# Patient Record
Sex: FEMALE | Race: OTHER | HISPANIC OR LATINO | Employment: FULL TIME | URBAN - METROPOLITAN AREA
[De-identification: names, ages, dates, MRNs, and addresses within clinical notes are randomized per-mention and may not be internally consistent; named-entity substitution may affect disease eponyms.]

---

## 2022-07-26 ENCOUNTER — APPOINTMENT (OUTPATIENT)
Dept: URGENT CARE | Facility: CLINIC | Age: 37
End: 2022-07-26
Payer: OTHER MISCELLANEOUS

## 2022-07-26 PROCEDURE — G0382 LEV 3 HOSP TYPE B ED VISIT: HCPCS | Performed by: NURSE PRACTITIONER

## 2022-07-26 PROCEDURE — 99283 EMERGENCY DEPT VISIT LOW MDM: CPT | Performed by: NURSE PRACTITIONER

## 2022-07-30 ENCOUNTER — APPOINTMENT (OUTPATIENT)
Dept: URGENT CARE | Facility: CLINIC | Age: 37
End: 2022-07-30
Payer: OTHER MISCELLANEOUS

## 2022-07-30 PROCEDURE — 99215 OFFICE O/P EST HI 40 MIN: CPT | Performed by: FAMILY MEDICINE

## 2022-08-03 ENCOUNTER — APPOINTMENT (OUTPATIENT)
Dept: URGENT CARE | Facility: CLINIC | Age: 37
End: 2022-08-03
Payer: OTHER MISCELLANEOUS

## 2022-08-03 PROCEDURE — 99214 OFFICE O/P EST MOD 30 MIN: CPT | Performed by: FAMILY MEDICINE

## 2022-09-14 ENCOUNTER — HOSPITAL ENCOUNTER (OUTPATIENT)
Dept: RADIOLOGY | Age: 37
Discharge: HOME | End: 2022-09-14
Payer: OTHER MISCELLANEOUS

## 2022-09-14 DIAGNOSIS — S50.12XD CONTUSION OF LEFT FOREARM, SUBSEQUENT ENCOUNTER: ICD-10-CM

## 2022-09-14 DIAGNOSIS — M79.642 LEFT HAND PAIN: ICD-10-CM

## 2022-09-14 DIAGNOSIS — S60.212D CONTUSION OF LEFT WRIST, SUBSEQUENT ENCOUNTER: ICD-10-CM

## 2022-09-14 PROCEDURE — G1004 CDSM NDSC: HCPCS

## 2022-09-14 PROCEDURE — 73221 MRI JOINT UPR EXTREM W/O DYE: CPT

## 2023-07-08 ENCOUNTER — HOSPITAL ENCOUNTER (EMERGENCY)
Facility: HOSPITAL | Age: 38
Discharge: HOME | End: 2023-07-08
Attending: EMERGENCY MEDICINE
Payer: COMMERCIAL

## 2023-07-08 VITALS
TEMPERATURE: 99.3 F | WEIGHT: 132.28 LBS | BODY MASS INDEX: 22.58 KG/M2 | RESPIRATION RATE: 18 BRPM | DIASTOLIC BLOOD PRESSURE: 87 MMHG | HEART RATE: 81 BPM | OXYGEN SATURATION: 98 % | SYSTOLIC BLOOD PRESSURE: 145 MMHG | HEIGHT: 64 IN

## 2023-07-08 DIAGNOSIS — H00.014 HORDEOLUM EXTERNUM OF LEFT UPPER EYELID: Primary | ICD-10-CM

## 2023-07-08 PROCEDURE — 99282 EMERGENCY DEPT VISIT SF MDM: CPT

## 2023-07-08 PROCEDURE — 99284 EMERGENCY DEPT VISIT MOD MDM: CPT | Performed by: EMERGENCY MEDICINE

## 2023-07-08 RX ORDER — TETRACAINE HYDROCHLORIDE 5 MG/ML
1 SOLUTION OPHTHALMIC ONCE
Status: COMPLETED | OUTPATIENT
Start: 2023-07-08 | End: 2023-07-08

## 2023-07-08 RX ORDER — ERYTHROMYCIN 5 MG/G
OINTMENT OPHTHALMIC
Qty: 3.5 G | Refills: 0 | Status: SHIPPED | OUTPATIENT
Start: 2023-07-08

## 2023-07-08 RX ADMIN — FLUORESCEIN SODIUM 1 STRIP: 1 STRIP OPHTHALMIC at 20:27

## 2023-07-08 RX ADMIN — TETRACAINE HYDROCHLORIDE 1 DROP: 5 SOLUTION OPHTHALMIC at 20:27

## 2023-07-08 NOTE — Clinical Note
Reddy Albarado was seen and treated in our emergency department on 7/8/2023. No restrictions            Diagnosis:     Reddy  may return to work on return date. She may return on this date: 07/10/2023         If you have any questions or concerns, please don't hesitate to call.       Devin Kearney MD    ______________________________           _______________          _______________  Hospital Representative                              Date                                Time

## 2023-07-09 NOTE — ED ATTENDING ATTESTATION
7/8/2023  IYobani MD, saw and evaluated the patient. I have discussed the patient with the resident/non-physician practitioner and agree with the resident's/non-physician practitioner's findings, Plan of Care, and MDM as documented in the resident's/non-physician practitioner's note, except where noted. All available labs and Radiology studies were reviewed. I was present for key portions of any procedure(s) performed by the resident/non-physician practitioner and I was immediately available to provide assistance. At this point I agree with the current assessment done in the Emergency Department.   I have conducted an independent evaluation of this patient a history and physical is as follows:    ED Course         Critical Care Time  Procedures

## 2023-07-09 NOTE — ED PROVIDER NOTES
History  Chief Complaint   Patient presents with   • Eye Pain     Pt presenting with Left eye pain, photosensitivity, and burning. She was recently diagnosed w/ an eye infection and has been on antibiotic eye drops      41year old female presents to ED with non radiating left eye pain. Pt was in the ED on 26th of June for the same eye problem and was given ciprofloxacin. Pt states that the antibiotics and warm compresses helped the eye pain. The pain is 8-9/10 and the pain is worse when she opens, closes her eye and moving her eye. The pain feels like the eye is growing and that there is something stabbing in her eye. Pt denies the use of any contacts or makeup.  used. ID: [id number]          None       History reviewed. No pertinent past medical history. History reviewed. No pertinent surgical history. History reviewed. No pertinent family history. I have reviewed and agree with the history as documented. E-Cigarette/Vaping     E-Cigarette/Vaping Substances     Social History     Tobacco Use   • Smoking status: Never   • Smokeless tobacco: Never   Substance Use Topics   • Alcohol use: Not Currently   • Drug use: Never        Review of Systems   Constitutional: Negative for chills and fever. Respiratory: Negative for cough, chest tightness and shortness of breath. Cardiovascular: Negative for chest pain. Gastrointestinal: Negative for constipation, diarrhea, nausea and vomiting. Genitourinary: Negative for difficulty urinating, dysuria, hematuria and urgency.        Physical Exam  ED Triage Vitals   Temperature Pulse Respirations Blood Pressure SpO2   07/08/23 1715 07/08/23 1715 07/08/23 1715 07/08/23 1715 07/08/23 1715   98.4 °F (36.9 °C) 80 17 145/87 99 %      Temp Source Heart Rate Source Patient Position - Orthostatic VS BP Location FiO2 (%)   07/08/23 1715 07/08/23 1715 07/08/23 1715 07/08/23 1715 --   Oral Monitor Sitting Left arm       Pain Score       07/08/23 1716 8             Orthostatic Vital Signs  Vitals:    07/08/23 1715 07/08/23 1716   BP: 145/87 145/87   Pulse: 80 81   Patient Position - Orthostatic VS: Sitting Sitting       Physical Exam  Eyes:      General: No scleral icterus. Left eye: Hordeolum (small, in the left corner of the left eye) present. Intraocular pressure: Left eye pressure is 18 mmHg. Measurements were taken using a handheld tonometer. Extraocular Movements: Extraocular movements intact. Left eye: Normal extraocular motion. Conjunctiva/sclera:      Left eye: Left conjunctiva is injected (small injection in the corners of the left eye). No exudate or hemorrhage. Pupils: Pupils are equal, round, and reactive to light. Left eye: No corneal abrasion or fluorescein uptake. Funduscopic exam:        Left eye: Red reflex present. Slit lamp exam:     Left eye: Photophobia present. No corneal ulcer, foreign body, hyphema or hypopyon. ED Medications  Medications   fluorescein sodium sterile ophthalmic strip 1 strip (1 strip Left Eye Given 7/8/23 2027)   tetracaine 0.5 % ophthalmic solution 1 drop (1 drop Left Eye Given 7/8/23 2027)       Diagnostic Studies  Results Reviewed     None                 No orders to display         Procedures  Procedures      ED Course                                       Medical Decision Making  41year old female presents to ED with left eye pain. Pt was diagnosed with a hordeolum on the left eye on June 26th in the ED. Pt was taking ciprofloxacin with mild relief but the pain was continuing. The pt was photosensitive, and the pain worsened with eye movement, opening and closing of the eye. Tetracaine and fluorescent dye was given to the pt. No corneal ulcers or foreign bodies seen. Tonometry showed 18 mmHg. Patient was discharged with erythromycin ointment and told to follow up with ophthalmology if symptoms persist or worsen.        Risk  Prescription drug management. Disposition  Final diagnoses:   Hordeolum externum of left upper eyelid     Time reflects when diagnosis was documented in both MDM as applicable and the Disposition within this note     Time User Action Codes Description Comment    7/8/2023  8:52 PM Wallace Delarosa Add [C11.217] Hordeolum externum of left upper eyelid       ED Disposition     ED Disposition   Discharge    Condition   Stable    Date/Time   Sat Jul 8, 2023  8:51 PM    Comment   Reddy Albarado discharge to home/self care. Follow-up Information     Follow up With Specialties Details Why Contact Info    Claire Bellamy MD Ophthalmology Schedule an appointment as soon as possible for a visit in 1 week As needed, If symptoms worsen [address]  [phone]            Patient's Medications   Discharge Prescriptions    ERYTHROMYCIN (ILOTYCIN) OPHTHALMIC OINTMENT    Place a 1/2 inch ribbon of ointment into the lower eyelid. Start Date: 7/8/2023  End Date: --       Order Dose: --       Quantity: 3.5 g    Refills: 0     No discharge procedures on file. PDMP Review     None           ED Provider  Attending physically available and evaluated Reddy CamposDannaluca. I managed the patient along with the ED Attending.     Electronically Signed by         Wallace Delarosa MD  07/08/23 9685

## 2024-10-16 ENCOUNTER — APPOINTMENT (EMERGENCY)
Dept: ULTRASOUND IMAGING | Facility: HOSPITAL | Age: 39
End: 2024-10-16
Payer: COMMERCIAL

## 2024-10-16 ENCOUNTER — HOSPITAL ENCOUNTER (EMERGENCY)
Facility: HOSPITAL | Age: 39
Discharge: HOME | End: 2024-10-16
Payer: COMMERCIAL

## 2024-10-16 VITALS
SYSTOLIC BLOOD PRESSURE: 133 MMHG | HEART RATE: 82 BPM | DIASTOLIC BLOOD PRESSURE: 76 MMHG | TEMPERATURE: 98.3 F | OXYGEN SATURATION: 100 % | RESPIRATION RATE: 18 BRPM

## 2024-10-16 DIAGNOSIS — R10.31 RIGHT LOWER QUADRANT ABDOMINAL PAIN: Primary | ICD-10-CM

## 2024-10-16 DIAGNOSIS — Z32.01 POSITIVE PREGNANCY TEST: ICD-10-CM

## 2024-10-16 LAB
ALBUMIN SERPL BCG-MCNC: 4.6 G/DL (ref 3.5–5)
ALP SERPL-CCNC: 47 U/L (ref 34–104)
ALT SERPL W P-5'-P-CCNC: 10 U/L (ref 7–52)
ANION GAP SERPL CALCULATED.3IONS-SCNC: 7 MMOL/L (ref 4–13)
AST SERPL W P-5'-P-CCNC: 16 U/L (ref 13–39)
B-HCG SERPL-ACNC: 622.7 MIU/ML (ref 0–5)
BASOPHILS # BLD AUTO: 0.1 THOUSANDS/ΜL (ref 0–0.1)
BASOPHILS NFR BLD AUTO: 1 % (ref 0–1)
BILIRUB SERPL-MCNC: 0.61 MG/DL (ref 0.2–1)
BILIRUB UR QL STRIP: NEGATIVE
BUN SERPL-MCNC: 16 MG/DL (ref 5–25)
CALCIUM SERPL-MCNC: 9.1 MG/DL (ref 8.4–10.2)
CHLORIDE SERPL-SCNC: 104 MMOL/L (ref 96–108)
CLARITY UR: CLEAR
CO2 SERPL-SCNC: 25 MMOL/L (ref 21–32)
COLOR UR: ABNORMAL
CREAT SERPL-MCNC: 0.59 MG/DL (ref 0.6–1.3)
EOSINOPHIL # BLD AUTO: 0.28 THOUSAND/ΜL (ref 0–0.61)
EOSINOPHIL NFR BLD AUTO: 4 % (ref 0–6)
ERYTHROCYTE [DISTWIDTH] IN BLOOD BY AUTOMATED COUNT: 12.1 % (ref 11.6–15.1)
EXT PREGNANCY TEST URINE: POSITIVE
EXT. CONTROL: ABNORMAL
GFR SERPL CREATININE-BSD FRML MDRD: 116 ML/MIN/1.73SQ M
GLUCOSE SERPL-MCNC: 87 MG/DL (ref 65–140)
GLUCOSE UR STRIP-MCNC: NEGATIVE MG/DL
HCT VFR BLD AUTO: 40 % (ref 34.8–46.1)
HGB BLD-MCNC: 13.1 G/DL (ref 11.5–15.4)
HGB UR QL STRIP.AUTO: NEGATIVE
IMM GRANULOCYTES # BLD AUTO: 0.02 THOUSAND/UL (ref 0–0.2)
IMM GRANULOCYTES NFR BLD AUTO: 0 % (ref 0–2)
KETONES UR STRIP-MCNC: ABNORMAL MG/DL
LEUKOCYTE ESTERASE UR QL STRIP: NEGATIVE
LIPASE SERPL-CCNC: 21 U/L (ref 11–82)
LYMPHOCYTES # BLD AUTO: 2.52 THOUSANDS/ΜL (ref 0.6–4.47)
LYMPHOCYTES NFR BLD AUTO: 34 % (ref 14–44)
MCH RBC QN AUTO: 29.8 PG (ref 26.8–34.3)
MCHC RBC AUTO-ENTMCNC: 32.8 G/DL (ref 31.4–37.4)
MCV RBC AUTO: 91 FL (ref 82–98)
MONOCYTES # BLD AUTO: 0.37 THOUSAND/ΜL (ref 0.17–1.22)
MONOCYTES NFR BLD AUTO: 5 % (ref 4–12)
NEUTROPHILS # BLD AUTO: 4.03 THOUSANDS/ΜL (ref 1.85–7.62)
NEUTS SEG NFR BLD AUTO: 56 % (ref 43–75)
NITRITE UR QL STRIP: NEGATIVE
NRBC BLD AUTO-RTO: 0 /100 WBCS
PH UR STRIP.AUTO: 6 [PH]
PLATELET # BLD AUTO: 240 THOUSANDS/UL (ref 149–390)
PMV BLD AUTO: 11.1 FL (ref 8.9–12.7)
POTASSIUM SERPL-SCNC: 3.4 MMOL/L (ref 3.5–5.3)
PROT SERPL-MCNC: 7.6 G/DL (ref 6.4–8.4)
PROT UR STRIP-MCNC: NEGATIVE MG/DL
RBC # BLD AUTO: 4.4 MILLION/UL (ref 3.81–5.12)
SODIUM SERPL-SCNC: 136 MMOL/L (ref 135–147)
SP GR UR STRIP.AUTO: 1.02 (ref 1–1.03)
UROBILINOGEN UR STRIP-ACNC: <2 MG/DL
WBC # BLD AUTO: 7.32 THOUSAND/UL (ref 4.31–10.16)

## 2024-10-16 PROCEDURE — 99285 EMERGENCY DEPT VISIT HI MDM: CPT

## 2024-10-16 PROCEDURE — 84702 CHORIONIC GONADOTROPIN TEST: CPT

## 2024-10-16 PROCEDURE — 96361 HYDRATE IV INFUSION ADD-ON: CPT

## 2024-10-16 PROCEDURE — 76815 OB US LIMITED FETUS(S): CPT

## 2024-10-16 PROCEDURE — 80053 COMPREHEN METABOLIC PANEL: CPT

## 2024-10-16 PROCEDURE — 81025 URINE PREGNANCY TEST: CPT

## 2024-10-16 PROCEDURE — 99283 EMERGENCY DEPT VISIT LOW MDM: CPT

## 2024-10-16 PROCEDURE — 96360 HYDRATION IV INFUSION INIT: CPT

## 2024-10-16 PROCEDURE — 81003 URINALYSIS AUTO W/O SCOPE: CPT

## 2024-10-16 PROCEDURE — 85025 COMPLETE CBC W/AUTO DIFF WBC: CPT

## 2024-10-16 PROCEDURE — 83690 ASSAY OF LIPASE: CPT

## 2024-10-16 PROCEDURE — 36415 COLL VENOUS BLD VENIPUNCTURE: CPT

## 2024-10-16 RX ORDER — POTASSIUM CHLORIDE 1500 MG/1
40 TABLET, EXTENDED RELEASE ORAL ONCE
Status: COMPLETED | OUTPATIENT
Start: 2024-10-16 | End: 2024-10-16

## 2024-10-16 RX ORDER — KETOROLAC TROMETHAMINE 30 MG/ML
15 INJECTION, SOLUTION INTRAMUSCULAR; INTRAVENOUS ONCE
Status: DISCONTINUED | OUTPATIENT
Start: 2024-10-16 | End: 2024-10-16

## 2024-10-16 RX ORDER — ACETAMINOPHEN 325 MG/1
975 TABLET ORAL ONCE
Status: COMPLETED | OUTPATIENT
Start: 2024-10-16 | End: 2024-10-16

## 2024-10-16 RX ORDER — METOCLOPRAMIDE HYDROCHLORIDE 5 MG/ML
10 INJECTION INTRAMUSCULAR; INTRAVENOUS ONCE
Status: DISCONTINUED | OUTPATIENT
Start: 2024-10-16 | End: 2024-10-16

## 2024-10-16 RX ORDER — MAGNESIUM SULFATE HEPTAHYDRATE 40 MG/ML
2 INJECTION, SOLUTION INTRAVENOUS ONCE
Status: DISCONTINUED | OUTPATIENT
Start: 2024-10-16 | End: 2024-10-16

## 2024-10-16 RX ADMIN — ACETAMINOPHEN 975 MG: 325 TABLET ORAL at 13:48

## 2024-10-16 RX ADMIN — POTASSIUM CHLORIDE 40 MEQ: 1500 TABLET, EXTENDED RELEASE ORAL at 17:06

## 2024-10-16 RX ADMIN — SODIUM CHLORIDE 1000 ML: 0.9 INJECTION, SOLUTION INTRAVENOUS at 13:30

## 2024-10-16 NOTE — DISCHARGE INSTRUCTIONS
Return to the Emergency Department sooner if increased bleeding, pain, fever, vomiting, difficulty breathing or urinating, weakness, dizziness.     Please utilize the provided contact information to establish an appointment with our OB/GYN team as soon as possible.  We would encourage you to follow-up with them in 1 week.  Please call the provided contact information to schedule this appointment at your earliest convenience.      You have also been ordered of laboratory study (hCG, quantitative) that we would like you to have conducted within the next 2-3 days.  Please return to a laboratory within the [facility] in order to get this test done given this timeframe.    If you experience any worsening of symptomology, passing out, loss of consciousness, severe abdominal discomfort, nausea, vomiting, vaginal bleeding, please immediately return to the emergency department for further evaluation and potential hospitalization or consultation with her OB/GYN team.

## 2024-10-16 NOTE — ED PROVIDER NOTES
Time reflects when diagnosis was documented in both MDM as applicable and the Disposition within this note       Time User Action Codes Description Comment    10/16/2024  5:08 PM Chu Rooney [R10.31] Right lower quadrant abdominal pain     10/16/2024  5:08 PM Chu Rooney [Z32.01] Positive pregnancy test           ED Disposition       ED Disposition   Discharge    Condition   Stable    Date/Time   Wed Oct 16, 2024  5:08 PM    Comment   Keishakayleigh John Jose discharge to home/self care.                   Assessment & Plan       Medical Decision Making  Patient is a 39-year-old female who presents to the emergency department with right lower quadrant/groin discomfort that has been ongoing for approximately 48 hours with mild headache.  She states that she is a very regular menstrual cycle and has been late on her current.  With no vaginal bleeding or discharge.    DDx including but not limited to: groin strain, hernia, lymphadenopathy, renal colic, UTI, urethritis, intraabdominal process, testicular etiology, cellulitis.     Based off of initial presenting complaints as well as patient describing menstrual abnormalities, urine analysis, laboratory studies, and assessment for potential infectious etiology versus pregnancy was conducted.  Patient was noted to be positive for pregnancy with a beta-hCG level of 600s which is indicative of a very early pregnancy.  Based on the last menstrual period that was in September of last month, ultrasonography was conducted for evaluation rule out of ectopic pregnancy.  Pain is well-controlled at this time with unremarkable vitals.    Pain responded well to administration of acetaminophen and fluids.    Ultrasonography was conducted with no confirmation of of intrauterine gestation or adnexal mass.  Differential with regards to radiology read and findings appear to be consistent with early intrauterine pregnancy, spontaneous , or ectopic pregnancy.   With vitals unremarkable as well as ultrasonography findings collected, this case was discussed with the on-call OB/GYN provider with regards to the anticipated plan of repeat beta-hCG in 2 days, close follow-up with OB/GYN office within the week, and serial intervention/ultrasonography for evaluation of pregnancy.  Strict return precautions and red flag symptoms that would warrant continued evaluation in the emergency department as well as worsening of symptoms that could indicate worsening of ectopic versus rupture of ectopic were discussed at the bedside with both patient as well as .  All parties expressed understanding prior to discharge from the emergency department.      Anticipated plan of patient undergoing serial beta-hCG laboratory assessment within the next 2 days and follow-up with OB/GYN providers within the week.    Amount and/or Complexity of Data Reviewed  Labs: ordered. Decision-making details documented in ED Course.  Radiology: ordered. Decision-making details documented in ED Course.    Risk  OTC drugs.  Prescription drug management.        ED Course as of 10/16/24 2242   Wed Oct 16, 2024   1332 PREGNANCY TEST URINE(!): Positive   1332 Patient has a positive pregnancy test at this time.  Persistent with right groin pain.  Will proceed with laboratory evaluation and imaging studies for rule out of ectopic pregnancy.   1605 US OB pregnancy limited with transvaginal  FINDINGS:     UTERUS:  The uterus is anteverted in position, measuring 7.0 x 4.4 x 5.3 cm.  15 mm subserosal/exophytic posterior fibroid. 13 mm posterior intramural fibroid.  The cervix is closed and within normal limits.     ENDOMETRIUM:  Endometrial stripe measures 6.0 mm.  No evidence of intrauterine gestation.     OVARIES/ADNEXA:  No adnexal mass evident.  There is no free fluid.     Right ovary:  3.0 x 2.2 x 2.1 cm. Volume 7.3  Doppler flow present.  No suspicious abnormality.     Left ovary:  2.7 x 1.1 x 1.3 cm.  Volume   2.0  Doppler flow present.  No suspicious abnormality.     IMPRESSION:  No intrauterine gestation or adnexal mass identified.  Differential remains early IUP, spontaneous  and ectopic pregnancy.  Correlate with serial quantitative BHCG.         Medications   sodium chloride 0.9 % bolus 1,000 mL (0 mL Intravenous Stopped 10/16/24 1515)   acetaminophen (TYLENOL) tablet 975 mg (975 mg Oral Given 10/16/24 1348)   potassium chloride (Klor-Con M20) CR tablet 40 mEq (40 mEq Oral Given 10/16/24 1706)       ED Risk Strat Scores                                               History of Present Illness       Chief Complaint   Patient presents with    Headache     Pt reports ongoing headache from , worsening yesterday. Pt also reports R side groin pain that began Monday. Pt reports increased urination. Denies blood in urine.        History reviewed. No pertinent past medical history.   History reviewed. No pertinent surgical history.   History reviewed. No pertinent family history.   Social History     Tobacco Use    Smoking status: Never    Smokeless tobacco: Never   Vaping Use    Vaping status: Never Used   Substance Use Topics    Alcohol use: Not Currently    Drug use: Never      E-Cigarette/Vaping    E-Cigarette Use Never User       E-Cigarette/Vaping Substances      I have reviewed and agree with the history as documented.     Patient is a 39-year-old female who presents to the emergency department with complaints of right groin pain that been ongoing for approximately 48 hours as well as persistence of head discomfort.  She endorses that she has been multiple days late on her menstrual cycle and is unsure if that is related.    Describes that her right groin pain does not radiate down her leg or up into her belly.  No overlying rashes or discolorations.  No palpable masses or discolorations noted.  She does state that she has a remote history of hernia repair as a small child.  She states that the pain does  not fluctuate with regards to bearing down or passing bowel movements.  She denies any numbness or paresthesias.  Denies any dysuria, increased urinary frequency, vaginal bleeding, vaginal discharge.  Denies any nausea or vomiting.  She denies any trauma to the abdomen or area.  Denies any difficulty with ambulation.      History provided by:  Patient   used: No        Review of Systems   Constitutional:  Negative for activity change, appetite change, chills and fever.   HENT:  Negative for ear pain and sore throat.    Eyes:  Negative for pain, discharge, itching and visual disturbance.   Respiratory:  Negative for cough and shortness of breath.    Cardiovascular:  Negative for chest pain and palpitations.   Gastrointestinal:  Positive for abdominal pain. Negative for abdominal distention and vomiting.   Genitourinary:  Negative for difficulty urinating, dyspareunia, dysuria and hematuria.   Musculoskeletal:  Negative for arthralgias and back pain.   Skin:  Negative for color change and rash.   Neurological:  Negative for dizziness, seizures, syncope and facial asymmetry.   Psychiatric/Behavioral:  Negative for agitation.    All other systems reviewed and are negative.          Objective       ED Triage Vitals   Temperature Pulse Blood Pressure Respirations SpO2 Patient Position - Orthostatic VS   10/16/24 1142 10/16/24 1142 10/16/24 1142 10/16/24 1142 10/16/24 1142 10/16/24 1142   98.3 °F (36.8 °C) 82 133/76 18 100 % Sitting      Temp Source Heart Rate Source BP Location FiO2 (%) Pain Score    10/16/24 1142 10/16/24 1142 10/16/24 1142 -- 10/16/24 1348    Oral Monitor Left arm  5      Vitals      Date and Time Temp Pulse SpO2 Resp BP Pain Score FACES Pain Rating User   10/16/24 1348 -- -- -- -- -- 5 -- LD   10/16/24 1142 98.3 °F (36.8 °C) 82 100 % 18 133/76 -- -- KD            Physical Exam  Vitals and nursing note reviewed.   Constitutional:       General: She is not in acute distress.      Appearance: Normal appearance. She is well-developed. She is not ill-appearing.   HENT:      Head: Normocephalic and atraumatic.      Right Ear: External ear normal.      Left Ear: External ear normal.      Nose: Nose normal. No congestion or rhinorrhea.      Mouth/Throat:      Mouth: Mucous membranes are moist.      Pharynx: No oropharyngeal exudate or posterior oropharyngeal erythema.   Eyes:      General:         Right eye: No discharge.         Left eye: No discharge.      Extraocular Movements: Extraocular movements intact.      Conjunctiva/sclera: Conjunctivae normal.      Pupils: Pupils are equal, round, and reactive to light.   Cardiovascular:      Rate and Rhythm: Normal rate and regular rhythm.      Heart sounds: No murmur heard.  Pulmonary:      Effort: Pulmonary effort is normal. No respiratory distress.      Breath sounds: Normal breath sounds.   Abdominal:      Palpations: Abdomen is soft.      Tenderness: There is no abdominal tenderness. There is no guarding or rebound.      Comments: Mild discomfort in the right lower quadrant with palpation.  No palpable mass appreciated.  No hernia appreciated on my examination or inspection of the groin.  Inability to reproduce this with forced contraction of the abdominal muscles, forced Valsalva, or having the patient contract abdomen with an abdominal contraction.   Musculoskeletal:         General: No swelling, deformity or signs of injury.      Cervical back: Normal range of motion and neck supple.   Skin:     General: Skin is warm and dry.      Capillary Refill: Capillary refill takes less than 2 seconds.      Coloration: Skin is not jaundiced.      Findings: No bruising or erythema.   Neurological:      General: No focal deficit present.      Mental Status: She is alert.   Psychiatric:         Mood and Affect: Mood normal.         Results Reviewed       Procedure Component Value Units Date/Time    hCG, quantitative [[id number]]  (Abnormal) Collected:  10/16/24 1321    Lab Status: Final result Specimen: Blood from Arm, Left Updated: 10/16/24 1616     HCG, Quant 622.7 mIU/mL     Narrative:       Expected Ranges:    HCG results between 5.0 and 25.0 mIU/mL may be indicative of early pregnancy but should be interpreted in light of the total clinical presentation.    HCG can rise to detectable levels in ozzie and post menopausal women (0-11.6 mIU/mL).     Approximate               Approximate HCG  Gestation age          Concentration ( mIU/mL)  _____________          ______________________   Weeks                      HCG values  0.2-1                       5-50  1-2                           2-3                         100-5000  3-4                         500-96565  4-5                         1000-39846  5-6                         [id number]  6-8                         [id number]  8-12                        [id number]      Comprehensive metabolic panel [[id number]]  (Abnormal) Collected: 10/16/24 1321    Lab Status: Final result Specimen: Blood from Arm, Left Updated: 10/16/24 1414     Sodium 136 mmol/L      Potassium 3.4 mmol/L      Chloride 104 mmol/L      CO2 25 mmol/L      ANION GAP 7 mmol/L      BUN 16 mg/dL      Creatinine 0.59 mg/dL      Glucose 87 mg/dL      Calcium 9.1 mg/dL      AST 16 U/L      ALT 10 U/L      Alkaline Phosphatase 47 U/L      Total Protein 7.6 g/dL      Albumin 4.6 g/dL      Total Bilirubin 0.61 mg/dL      eGFR 116 ml/min/1.73sq m     Narrative:      National Kid[employer] Disease Foundation guidelines for Chronic Kidney Disease (CKD):     Stage 1 with normal or high GFR (GFR > 90 mL/min/1.73 square meters)    Stage 2 Mild CKD (GFR = 60-89 mL/min/1.73 square meters)    Stage 3A Moderate CKD (GFR = 45-59 mL/min/1.73 square meters)    Stage 3B Moderate CKD (GFR = 30-44 mL/min/1.73 square meters)    Stage 4 Severe CKD (GFR = 15-29 mL/min/1.73 square meters)    Stage 5 End Stage CKD (GFR <15 mL/min/1.73 square meters)  Note: GFR  calculation is accurate only with a steady state creatinine    Lipase [[id number]]  (Normal) Collected: 10/16/24 132    Lab Status: Final result Specimen: Blood from Arm, Left Updated: 10/16/24 1414     Lipase 21 u/L     CBC and differential [[id number]] Collected: 10/16/24 1321    Lab Status: Final result Specimen: Blood from Arm, Left Updated: 10/16/24 133     WBC 7.32 Thousand/uL      RBC 4.40 Million/uL      Hemoglobin 13.1 g/dL      Hematocrit 40.0 %      MCV 91 fL      MCH 29.8 pg      MCHC 32.8 g/dL      RDW 12.1 %      MPV 11.1 fL      Platelets 240 Thousands/uL      nRBC 0 /100 WBCs      Segmented % 56 %      Immature Grans % 0 %      Lymphocytes % 34 %      Monocytes % 5 %      Eosinophils Relative 4 %      Basophils Relative 1 %      Absolute Neutrophils 4.03 Thousands/µL      Absolute Immature Grans 0.02 Thousand/uL      Absolute Lymphocytes 2.52 Thousands/µL      Absolute Monocytes 0.37 Thousand/µL      Eosinophils Absolute 0.28 Thousand/µL      Basophils Absolute 0.10 Thousands/µL     UA w Reflex to Microscopic w Reflex to Culture [[id number]]  (Abnormal) Collected: 10/16/24 1316    Lab Status: Final result Specimen: Urine, Clean Catch Updated: 10/16/24 1330     Color, UA Light Yellow     Clarity, UA Clear     Specific Gravity, UA 1.024     pH, UA 6.0     Leukocytes, UA Negative     Nitrite, UA Negative     Protein, UA Negative mg/dl      Glucose, UA Negative mg/dl      Ketones, UA 10 (1+) mg/dl      Urobilinogen, UA <2.0 mg/dl      Bilirubin, UA Negative     Occult Blood, UA Negative    POCT pregnancy, urine [[id number]]  (Abnormal) Resulted: 10/16/24 1330    Lab Status: Final result Updated: 10/16/24 1330     EXT Preg Test, Ur Positive     Control Valid            US OB pregnancy limited with transvaginal   Final Interpretation by Nehemiah Siddiqui MD (10/16 1548)   No intrauterine gestation or adnexal mass identified.   Differential remains early IUP, spontaneous  and ectopic  pregnancy.  Correlate with serial quantitative BHCG.            Workstation performed: [id number]             Procedures    ED Medication and Procedure Management   Prior to Admission Medications   Prescriptions Last Dose Informant Patient Reported? Taking?   erythromycin (ILOTYCIN) ophthalmic ointment   No No   Sig: Place a 1/2 inch ribbon of ointment into the lower eyelid.      Facility-Administered Medications: None     Discharge Medication List as of 10/16/2024  5:29 PM        CONTINUE these medications which have NOT CHANGED    Details   erythromycin (ILOTYCIN) ophthalmic ointment Place a 1/2 inch ribbon of ointment into the lower eyelid., Print           Outpatient Discharge Orders   hCG, quantitative   Standing Status: Future Standing Exp. Date: 10/16/25     ED SEPSIS DOCUMENTATION   Time reflects when diagnosis was documented in both MDM as applicable and the Disposition within this note       Time User Action Codes Description Comment    10/16/2024  5:08 PM Chu Rooney [R10.31] Right lower quadrant abdominal pain     10/16/2024  5:08 PM Chu Rooney [Z32.01] Positive pregnancy test                  Chu Rooney MD  10/16/24 2146

## 2024-10-17 ENCOUNTER — TELEPHONE (OUTPATIENT)
Age: 39
End: 2024-10-17

## 2024-10-17 ENCOUNTER — NURSE TRIAGE (OUTPATIENT)
Age: 39
End: 2024-10-17

## 2024-10-17 NOTE — TELEPHONE ENCOUNTER
Regarding: possible ectopic pregnancy  ----- Message from Chloe SCHMIDT sent at 10/17/2024 12:53 PM EDT -----  NP called in to schedule appointment as soon as possible. She is experiencing lower abdominal pain and she has positive pregnancy test. She was in ED they sent a referral regarding possible ectopic pregnancy. She is ok with going to any location but no urgent appointments available.

## 2024-10-17 NOTE — TELEPHONE ENCOUNTER
Spoke to patient with LMP 9/16, +HPT in ED yesterday.  She reports she was seen in the ER yesterday for RLQ pain.  She advised pain has resolved, denies any vaginal bleeding.  Warm call to office, advised to send to clerical pool.  Pt advised to return ER with any severe pain, or vaginal bleeding.  Office will call back to schedule D&V.  Pt verbalized understanding, no further questions or concerns at this time.

## 2024-10-19 ENCOUNTER — APPOINTMENT (OUTPATIENT)
Dept: LAB | Facility: CLINIC | Age: 39
End: 2024-10-19
Payer: COMMERCIAL

## 2024-10-19 DIAGNOSIS — Z32.01 POSITIVE PREGNANCY TEST: ICD-10-CM

## 2024-10-19 DIAGNOSIS — R10.31 RIGHT LOWER QUADRANT ABDOMINAL PAIN: ICD-10-CM

## 2024-10-19 LAB — B-HCG SERPL-ACNC: 2579.6 MIU/ML (ref 0–5)

## 2024-10-19 PROCEDURE — 36415 COLL VENOUS BLD VENIPUNCTURE: CPT

## 2024-10-19 PROCEDURE — 84702 CHORIONIC GONADOTROPIN TEST: CPT

## 2024-10-24 ENCOUNTER — OFFICE VISIT (OUTPATIENT)
Dept: OBGYN CLINIC | Facility: CLINIC | Age: 39
End: 2024-10-24

## 2024-10-24 ENCOUNTER — APPOINTMENT (OUTPATIENT)
Dept: LAB | Facility: CLINIC | Age: 39
End: 2024-10-24
Payer: COMMERCIAL

## 2024-10-24 VITALS
RESPIRATION RATE: 18 BRPM | BODY MASS INDEX: 24.92 KG/M2 | WEIGHT: 146 LBS | DIASTOLIC BLOOD PRESSURE: 90 MMHG | HEART RATE: 98 BPM | SYSTOLIC BLOOD PRESSURE: 137 MMHG | HEIGHT: 64 IN

## 2024-10-24 DIAGNOSIS — R10.31 RIGHT LOWER QUADRANT ABDOMINAL PAIN: ICD-10-CM

## 2024-10-24 DIAGNOSIS — Z32.01 POSITIVE PREGNANCY TEST: ICD-10-CM

## 2024-10-24 LAB — B-HCG SERPL-ACNC: ABNORMAL MIU/ML (ref 0–5)

## 2024-10-24 PROCEDURE — 36415 COLL VENOUS BLD VENIPUNCTURE: CPT

## 2024-10-24 PROCEDURE — 84702 CHORIONIC GONADOTROPIN TEST: CPT

## 2024-10-31 NOTE — PROGRESS NOTES
"S: 39 y.o.   who presents for viability scan with LMP of 24. She is 5 weeks and 3 days by her LMP. She reports she has had some vaginal spotting for the past several days. She was seen in the ED on 10/16/24for RLQ and for not having her menses as she knows she is very regular, and was told she was pregnant. She had TVUS that showed no intrauterine gestation or adnexal mass. B-hcg at the time was 2579. She has not had any nausea or vomiting.This is a planned and welcomed pregnancy and she is very nervous.     History reviewed. No pertinent past medical history.    OB History    Para Term  AB Living   1             SAB IAB Ectopic Multiple Live Births                  # Outcome Date GA Lbr Georgi/2nd Weight Sex Type Anes PTL Lv   1 Current                 O:  Vitals:    10/24/24 1515   BP: 137/90   BP Location: Right arm   Patient Position: Sitting   Cuff Size: Adult   Pulse: 98   Resp: 18   Weight: 66.2 kg (146 lb)   Height: 5' 3.78\" (1.62 m)       TVUS: gestational sac and yolk sac visible, no fetal pole visualized. No adnexal mass.      A/P:  #1. Early IUP vs SAB vs heterotopic pregnancy  - Gestational sac and yolk sac visualized  - RTC in 10-14 days for repeat TVUS   - Repeat b-hcg given vaginal bleeding to ensure this is not an early IUP and not SAB  - Start taking prenatal vitamins  - Continue monitoring bleeding and return to ED if bleeding is heavy (fills more than one pad an hour or is associated with symptoms).              Problem List Items Addressed This Visit    None  Visit Diagnoses       Right lower quadrant abdominal pain        Positive pregnancy test        Relevant Orders    hCG, quantitative (Completed)            Camron Parson MD  PGY-4  10/31/2024  1:38 PM         "

## 2024-11-07 ENCOUNTER — ULTRASOUND (OUTPATIENT)
Dept: OBGYN CLINIC | Facility: CLINIC | Age: 39
End: 2024-11-07

## 2024-11-07 VITALS
SYSTOLIC BLOOD PRESSURE: 128 MMHG | BODY MASS INDEX: 25.92 KG/M2 | HEIGHT: 64 IN | DIASTOLIC BLOOD PRESSURE: 77 MMHG | WEIGHT: 151.8 LBS | RESPIRATION RATE: 18 BRPM | HEART RATE: 99 BPM

## 2024-11-07 DIAGNOSIS — O09.521 MULTIGRAVIDA OF ADVANCED MATERNAL AGE IN FIRST TRIMESTER: ICD-10-CM

## 2024-11-07 DIAGNOSIS — K21.9 GASTROESOPHAGEAL REFLUX DISEASE WITHOUT ESOPHAGITIS: ICD-10-CM

## 2024-11-07 DIAGNOSIS — Z3A.01 7 WEEKS GESTATION OF PREGNANCY: Primary | ICD-10-CM

## 2024-11-07 RX ORDER — CALCIUM CARBONATE 500 MG/1
2 TABLET, CHEWABLE ORAL 3 TIMES DAILY
Qty: 60 TABLET | Refills: 6 | Status: SHIPPED | OUTPATIENT
Start: 2024-11-07

## 2024-11-07 RX ORDER — ASPIRIN 81 MG/1
162 TABLET, CHEWABLE ORAL DAILY
Qty: 60 TABLET | Refills: 10 | Status: SHIPPED | OUTPATIENT
Start: 2024-11-07

## 2024-11-07 RX ORDER — PANTOPRAZOLE SODIUM 20 MG/1
20 TABLET, DELAYED RELEASE ORAL DAILY
Qty: 30 TABLET | Refills: 8 | Status: SHIPPED | OUTPATIENT
Start: 2024-11-07

## 2024-11-07 NOTE — PROGRESS NOTES
"S: 39 y.o.   who presents for viability scan with LMP of 24. She is 7 weeks and 3 days by her LMP. She reports she has had heart burn every time she eats. She does not have nausea or vomiting. She does not have cramping, and vaginal bleeding has stopped. This is a planned and welcomed pregnancy. She has been taking prenatal vitamins.    History reviewed. No pertinent past medical history.    OB History    Para Term  AB Living   1             SAB IAB Ectopic Multiple Live Births                  # Outcome Date GA Lbr Georgi/2nd Weight Sex Type Anes PTL Lv   1 Current                 O:  Vitals:    24 0808   BP: 128/77   BP Location: Right arm   Patient Position: Sitting   Cuff Size: Adult   Pulse: 99   Resp: 18   Weight: 68.9 kg (151 lb 12.8 oz)   Height: 5' 3.78\" (1.62 m)       TVUS: viable, velasquez IUP at 7 weeks 1 days with CRL 1.02 cm.  bpm. MARIA TERESA 2024. Final dating via LMP.      A/P:  #1. IUP at 7 weeks and 1 days  - Viable pregnancy on TVUS  - Continue PNV  - MFM consult for aneuploidy screening  - Prenatal labs ordered  - Flu vaccine at next visit  - RTC in 1-2 weeks for nurse intake visit                                  Problem List Items Addressed This Visit    None  Visit Diagnoses       7 weeks gestation of pregnancy    -  Primary    Relevant Medications    aspirin 81 mg chewable tablet    Other Relevant Orders    Ambulatory Referral to Maternal Fetal Medicine    HIV 1/2 AG/AB w Reflex [facility] for 2 yr old and above    Hepatitis C antibody    UA (URINE) with reflex to Scope    Urine culture    Hepatitis B surface antigen    CBC and differential    Rubella antibody, IgG    Type and screen    RPR-Syphilis Screening (Total Syphilis IGG/IGM)    Hepatitis B surface antibody    Anemia Panel w/Reflex, OB    Varicella zoster antibody, IgG    Multigravida of advanced maternal age in first trimester        Gastroesophageal reflux disease without esophagitis        Relevant " Medications    pantoprazole (PROTONIX) 20 mg tablet    calcium carbonate (TUMS) 500 mg chewable tablet            Camron Parson MD  PGY-4  11/7/2024  9:32 AM

## 2024-11-08 ENCOUNTER — TELEPHONE (OUTPATIENT)
Age: 39
End: 2024-11-08

## 2024-11-29 DIAGNOSIS — K21.9 GASTROESOPHAGEAL REFLUX DISEASE WITHOUT ESOPHAGITIS: ICD-10-CM

## 2024-11-29 DIAGNOSIS — Z3A.01 7 WEEKS GESTATION OF PREGNANCY: ICD-10-CM

## 2024-12-04 ENCOUNTER — TELEPHONE (OUTPATIENT)
Dept: OBGYN CLINIC | Facility: CLINIC | Age: 39
End: 2024-12-04

## 2024-12-10 RX ORDER — PANTOPRAZOLE SODIUM 20 MG/1
TABLET, DELAYED RELEASE ORAL
Qty: 90 TABLET | Refills: 3 | Status: SHIPPED | OUTPATIENT
Start: 2024-12-10

## 2024-12-10 RX ORDER — ASPIRIN 81 MG
TABLET,CHEWABLE ORAL
Qty: 180 TABLET | Refills: 4 | Status: SHIPPED | OUTPATIENT
Start: 2024-12-10

## 2024-12-12 ENCOUNTER — ROUTINE PRENATAL (OUTPATIENT)
Dept: OBGYN CLINIC | Facility: CLINIC | Age: 39
End: 2024-12-12

## 2024-12-12 ENCOUNTER — PATIENT OUTREACH (OUTPATIENT)
Dept: OBGYN CLINIC | Facility: CLINIC | Age: 39
End: 2024-12-12

## 2024-12-12 ENCOUNTER — INITIAL PRENATAL (OUTPATIENT)
Dept: OBGYN CLINIC | Facility: CLINIC | Age: 39
End: 2024-12-12

## 2024-12-12 VITALS
DIASTOLIC BLOOD PRESSURE: 82 MMHG | HEART RATE: 72 BPM | SYSTOLIC BLOOD PRESSURE: 126 MMHG | HEIGHT: 64 IN | BODY MASS INDEX: 27.35 KG/M2 | WEIGHT: 160.2 LBS

## 2024-12-12 DIAGNOSIS — Z20.2 POSSIBLE EXPOSURE TO STD: Primary | ICD-10-CM

## 2024-12-12 DIAGNOSIS — Z23 NEED FOR VACCINATION: ICD-10-CM

## 2024-12-12 DIAGNOSIS — Z3A.12 12 WEEKS GESTATION OF PREGNANCY: ICD-10-CM

## 2024-12-12 DIAGNOSIS — Z34.91 PRENATAL CARE IN FIRST TRIMESTER: Primary | ICD-10-CM

## 2024-12-12 PROCEDURE — 87491 CHLMYD TRACH DNA AMP PROBE: CPT

## 2024-12-12 PROCEDURE — 87591 N.GONORRHOEAE DNA AMP PROB: CPT

## 2024-12-12 NOTE — PROGRESS NOTES
OB/GYN  PRENATAL H&P VISIT  Reddy Garcia  2024  5:20 PM  Dr. Camron Parson MD      SUBJECTIVE  Reddy Garcia is a 39 y.o.  at 12w3d here for initial prenatal H&P.     She is currently doing well. She denies vaginal bleeding, cramping, leakage, abnormal discharge.     Past Medical History:  - None    Past Surgical History:  - R inguinal hernia repair when she was 7 years old  - Breast implants: 2017    Social History:  - Partner's name: Alex, involved: yes  - She works doing Quality control, the job is mostly seated.   - She denies hx of STD/STI, denies a hx of TB or close contacts with persons with TB. She has never had MRSA.   - She denies use of nicotine or recreational drug use. She denies use of ETOH.    Family History:  - She denies a family history of inheritable conditions such as physical or intellectual disabilities, birth defects, blood disorders, heart or neural tube defects.       OB History    Para Term  AB Living   1 0 0 0 0 0   SAB IAB Ectopic Multiple Live Births   0 0 0 0 0      # Outcome Date GA Lbr Georgi/2nd Weight Sex Type Anes PTL Lv   1 Current                  No past medical history on file.    Past Surgical History:   Procedure Laterality Date    INGUINAL HERNIA REPAIR         Social History     Socioeconomic History    Marital status: Single     Spouse name: Not on file    Number of children: Not on file    Years of education: Not on file    Highest education level: Not on file   Occupational History    Not on file   Tobacco Use    Smoking status: Never    Smokeless tobacco: Never   Vaping Use    Vaping status: Never Used   Substance and Sexual Activity    Alcohol use: Not Currently    Drug use: Never    Sexual activity: Yes     Partners: Male     Birth control/protection: None   Other Topics Concern    Not on file   Social History Narrative    ** Merged History Encounter **          Social Drivers of Health     Financial Resource Strain:  "Low Risk  (2024)    Overall Financial Resource Strain (CARDIA)     Difficulty of Paying Living Expenses: Not very hard   Food Insecurity: No Food Insecurity (2024)    Hunger Vital Sign     Worried About Running Out of Food in the Last Year: Never true     Ran Out of Food in the Last Year: Never true   Transportation Needs: No Transportation Needs (2024)    PRAPARE - Transportation     Lack of Transportation (Medical): No     Lack of Transportation (Non-Medical): No   Physical Activity: Not on file   Stress: Not on file   Social Connections: Not on file   Intimate Partner Violence: Not At Risk (2024)    Humiliation, Afraid, Rape, and Kick questionnaire     Fear of Current or Ex-Partner: No     Emotionally Abused: No     Physically Abused: No     Sexually Abused: No   Housing Stability: Low Risk  (2024)    Housing Stability Vital Sign     Unable to Pay for Housing in the Last Year: No     Number of Times Moved in the Last Year: 1     Homeless in the Last Year: No       OBJECTIVE  Vitals:    24 1454   BP: 126/82   Pulse: 72       General: Well appearing, no distress  Respiratory: Unlabored breathing  Cardiovascular: Regular rate.  Abdomen: Soft, gravid, nontender  Fundal Height: Appropriate for gestational age.  Extremities: Warm and well perfused.  Non tender.    ASSESSMENT AND PLAN    39 y.o., , with /82 (BP Location: Right arm, Patient Position: Sitting, Cuff Size: Standard)   Pulse 72   Ht 5' 4\" (1.626 m)   Wt 72.7 kg (160 lb 3.2 oz)   LMP 2024 (Exact Date) , at 12wd here for her prenatal H&P.    Pregnancy: H&P completed today. PN Labs not done, reminded to have done prior to next appt. Labor expectations discussed with patient, including appointment schedule, nutrition, exercise, medications, sexual intercourse, and nausea/vomiting. Patient's BMI is 27. Recommended weight gain is 15-25 lbs.  bpm by TAUS            Problem List       12 weeks gestation " of pregnancy    Overview   Overview:  Labs  Pap smear done in [facility] 6 mos, wnl per pt and GC/Ctcollected 24  Prenatal panel-reminded to have done  MSAFP [order at next visit]  28w labs [ ]  Vaccines:  Flu vaccine: given 24  Covid vaccine: 3 doses two years ago  Tdap vaccine: offer at 28 weeks  Contraception: POPs  Feeding plan: breastfeeding  Birth plan: , open to epidural  Delivery consent: to be signed at 28w  Ultrasounds: Level 1 scheduled for 24, wants NIPS          Other Visit Diagnoses         Possible exposure to STD    -  Primary      Need for vaccination                Camron Parson MD  2024  5:20 PM

## 2024-12-12 NOTE — LETTER
Dentist Letter          12/12/24      Reddy Garcia  1985  [address] [facility] [address]              We have had several requests from local dentist requesting permission to perform procedures on our patients who are pregnant. We wish to respond with this letter regarding some of the more routine procedures that we have been asked about.    The following procedures may be performed on our obstetric patients:   1. Administration of local anesthesia   2. Administration of antibiotics such as PCN, Ampicillin, and Erythromycin.   3. Administration of pain medications such as Tylenol, Tylenol with codeine, and if needed Percocet.   4. Shielded X-rays    Should you have any questions, please do not hesitate to contact at [phone].        Sincerely,    [facility]   [address] [place] [address]  [address] [phone]

## 2024-12-12 NOTE — LETTER
[facility] Letter    Reddy Garcia  1985  [address] [facility] [address]       12/12/24          Reddy Garcia is a patient and under our care in our office. Reddy Garcia's Estimated Date of Delivery: 06/23/2025  Any questions or concerns feel free to contact our office.     Thank you,    [facility]      [facility]  [phone]  [facility]  [phone]    [facility]/[place]  [phone]  [facility]  [phone]    [facility]/[place]   [phone]    [facility]  [facility]  [phone]

## 2024-12-12 NOTE — PROGRESS NOTES
ДМИТРИЙ ELIAS was referred to complete a PN SW assessment. Pt is 39 y.o.    female. Pt GA is Unknown and  Estimated Date of Delivery: None noted.. Pt primary language is Persian pt is bilingual. ДМИТРИЙ ELIAS used  service in case pt needed clarification ID [id number]. Pt was able to speak with ДМИТРИЙ ELIAS without additional support from .     Pt reports this to be a planned and welcomed pregnancy. Pt is living with her  and sister. Pt is working full time and denies financial difficulty, food insecurity, housing instability, transportation concern, MH and NANCY. Pt does not have insurance and will be meeting with the financial counselor to learn about applying for MA. Pt denies concerns for baby supplies.     ДМИТРИЙ ELIAS encouraged pt to contact ДМИТРИЙ  for additional support if needed. ДМИТРИЙ ELIAS will otherwise close this referral at this time.

## 2024-12-12 NOTE — LETTER
Work Letter    Reddy Garcia  1985  [address] [facility] [address]    Dear Reddy Garcia,      12/12/24        Your employee is a patient at [facility].    We recommend that all pregnant women:    1. Have a well-ventilated workspace.  2. Wear low-heeled shoes.  3. Work no more than 40 hours per week.  4. Have a 15 minute break every 2 hours and at least 30 minutes for a meal break.   5. Use good body mechanics by bending at your knees to avoid back strain and lift no more than 20 pounds without assistance. Will need assistance with lifting over 20 lbs.   6. Have ready access to bathrooms and water.      She may continue to work until her due date unless medical complications arise. We anticipate she may return to work in 6-8 weeks after delivery.     Sincerely,    [facility]  [address] [place] [address] [phone]

## 2024-12-12 NOTE — PROGRESS NOTES
OB INTAKE INTERVIEW  Pt presents for OB intake.     OB History    Para Term  AB Living   1        SAB IAB Ectopic Multiple Live Births             # Outcome Date GA Lbr Georgi/2nd Weight Sex Type Anes PTL Lv   1 Current              Hx of  delivery prior to 36 weeks 6 days:  No   If yes, place a referral for cervical surveillance at 16 weeks.     Last Menstrual Period:    Patient's last menstrual period was 2024 (exact date).     Ultrasound date: 2024  7 weeks 1 days     Estimated Date of Delivery: None noted.   Confirmed by LMP or US    H&P visit scheduled. 2024      Last pap smear: 2024 in [place]     Findings; lab pap smear results: no abnormalities    Current Issues:  Constipation :   No  Headaches :   No  Cramping:  No  Spotting :   No  PICA cravings :  No    FOB Involved:   Yes  Planned pregnancy:  Yes    I have these concerns about this prenatal patient:   Patient met with  prior to intake.   offered but patient speaking english.  Instructed to let me know if she doesn't understand and we will call [employer].  Lab results in Bulgarian on phone from [place].  Patient will email results so we can scan them into her chart.  NFP referral placed.    Interview education    Baby and Me Handout  [facility] Handout  [facility] Handout  Discussed genetic testing  Prenatal lab work: Scripts printed and given to pt.   Influenza vaccine given today: No  Discussed Tdap vaccine.   Immunizations:   Immunization History   Administered Date(s) Administered    COVID-19 MODERNA VACC 0.5 ML IM 2021, 2021, 2022     Depression Screening Follow-up Plan: Patient's depression screening was negative with an Harmans score of   0  Clinically patient does not have depression. No treatment is required..          Infection Screening: Does the pt have a hx of MRSA? No  If yes- please follow MRSA protocol and obtain a nasal swab for MRSA  culture    The patient was oriented to our practice and all questions were answered.  Interviewed by: Renay Pritchett RN 12/12/24

## 2024-12-12 NOTE — LETTER
Proof of Pregnancy Letter    Reddy Garcia  1985  [address] [facility] [address]        12/12/24      Reddy Garcia is a patient at our facility. Reddy Garcia Estimated Date of Delivery: 06/23/2025       Any questions or concerns, please feel free to contact our office.    Sincerely,     [facility]   [address] [place] [address] [phone]

## 2024-12-13 LAB
C TRACH DNA SPEC QL NAA+PROBE: NEGATIVE
N GONORRHOEA DNA SPEC QL NAA+PROBE: NEGATIVE

## 2024-12-18 ENCOUNTER — ROUTINE PRENATAL (OUTPATIENT)
Facility: HOSPITAL | Age: 39
End: 2024-12-18

## 2024-12-18 VITALS
BODY MASS INDEX: 28.07 KG/M2 | DIASTOLIC BLOOD PRESSURE: 70 MMHG | HEART RATE: 95 BPM | HEIGHT: 64 IN | SYSTOLIC BLOOD PRESSURE: 128 MMHG | WEIGHT: 164.4 LBS

## 2024-12-18 DIAGNOSIS — Z3A.13 13 WEEKS GESTATION OF PREGNANCY: Primary | ICD-10-CM

## 2024-12-18 DIAGNOSIS — Z3A.01 7 WEEKS GESTATION OF PREGNANCY: ICD-10-CM

## 2024-12-18 DIAGNOSIS — Z34.91 PRENATAL CARE IN FIRST TRIMESTER: ICD-10-CM

## 2024-12-18 DIAGNOSIS — Z36.82 ENCOUNTER FOR NUCHAL TRANSLUCENCY TESTING: ICD-10-CM

## 2024-12-18 DIAGNOSIS — Z33.1 PREGNANT STATE, INCIDENTAL: ICD-10-CM

## 2024-12-18 DIAGNOSIS — O09.511 PRIMIGRAVIDA OF ADVANCED MATERNAL AGE IN FIRST TRIMESTER: ICD-10-CM

## 2024-12-18 PROCEDURE — 76813 OB US NUCHAL MEAS 1 GEST: CPT | Performed by: OBSTETRICS & GYNECOLOGY

## 2024-12-18 PROCEDURE — 76801 OB US < 14 WKS SINGLE FETUS: CPT | Performed by: OBSTETRICS & GYNECOLOGY

## 2024-12-18 PROCEDURE — 99243 OFF/OP CNSLTJ NEW/EST LOW 30: CPT | Performed by: OBSTETRICS & GYNECOLOGY

## 2024-12-18 NOTE — PROGRESS NOTES
Reddy presents today for a genetic screening ultrasound.  This is her first pregnancy.  Other than maternal age, she has no other significant contributory medical, surgical, substance use, or family history.  A review of systems is otherwise negative.    We discussed the options for genetic screening, including but not limited to first trimester screening, second trimester screening, combined first and second trimester screening, noninvasive prenatal screening (NIPS) for patients at high risk and diagnostic screening through the use of CVS and amniocentesis. We discussed the risks and benefits of each approach including the sensitivities and false positive rates as well as the difference between a screening test and a diagnostic test. At the conclusion of our discussion the patient elected noninvasive prenatal screening utilizing the MaterniT 21 plus test. The patient had this blood work drawn in the office.   The results should be available in approximately 7-10 days.    Given the patient's history of nulliparity and maternal age, I recommend initiating low dose aspirin therapy.  A recent meta-analysis yielded risk reductions of 24% for preeclampsia, 20% for intrauterine growth restriction, and 14% for  birth, with an absolute risk reduction of 2-5% for preeclampsia, one to 5% for intrauterine growth restriction, and 2-4% for  birth.  In this study, there was no identified risk of harm to the mother or fetus but long-term evidence was somewhat limited.  Given the overall safety profile and risk-benefit analysis, I recommend 162 mg of aspirin be taken Daily and discontinued at around 36 weeks gestation or 2-3 weeks prior to planned delivery.   I reviewed these recommendations with the patient and answered all of her questions to apparent satisfaction.    We discussed follow-up in detail and I recommend an anatomy ultrasound be scheduled for 20 weeks gestation.    Thank you very much for allowing us to  "participate in the care of this very nice patient. Should you have any questions, please do not hesitate to contact me.    Portions of the record may have been created with voice recognition software. Occasional wrong word or \"sound a like\" substitutions may have occurred due to the inherent limitations of voice recognition software. Read the chart carefully and recognize, using context, where substitutions have occurred.  "

## 2024-12-24 ENCOUNTER — CLINICAL SUPPORT (OUTPATIENT)
Facility: HOSPITAL | Age: 39
End: 2024-12-24

## 2024-12-24 DIAGNOSIS — Z36.9 ANTENATAL SCREENING ENCOUNTER: Primary | ICD-10-CM

## 2024-12-24 PROCEDURE — 36415 COLL VENOUS BLD VENIPUNCTURE: CPT

## 2024-12-24 NOTE — PROGRESS NOTES
Patient chose to have [facility] [id number] Non-Invasive Prenatal Screen [id number] PLUS w/ SCA, WITH fetal sex.  Patient choose [facility]'s self-pay option of $299.     Patient given brochure and is aware [facility] will contact patient's insurance and coordinate coverage.  Provided [facility] contact information. General inquiries [phone], Cost estimates [phone] and [facility] Billing [phone]. Website [email or web address].     Blood collection tubes labeled with patient identifiers (name, medical record number, and date of birth).     Filled out [facility] order form. Patient chose to have blood drawn in our office at time of visit. NIPS was drawn from left arm with a straight needle by NGHIA Goldman. .      If patient chose to have blood work drawn at a [facility] lab we requested patient notify MFM (via phone call or Mentegram message) when blood collected so office can follow up on results.       Maternal Fetal Medicine will have results in approximately 5-7 business days and will call patient or notify via Mentegram.  Patient aware viewing lab result online will reveal fetal sex if ordered.    Patient verbalized understanding of all instructions and no questions at this time.

## 2024-12-29 ENCOUNTER — RESULTS FOLLOW-UP (OUTPATIENT)
Facility: HOSPITAL | Age: 39
End: 2024-12-29

## 2024-12-29 LAB
CFDNA.FET/CFDNA.TOTAL SFR FETUS: NORMAL %
CITATION REF LAB TEST: NORMAL
FET 13+18+21+X+Y ANEUP PLAS.CFDNA: NEGATIVE
FET CHR 21 TS PLAS.CFDNA QL: NEGATIVE
FET CHR 21 TS PLAS.CFDNA QL: NEGATIVE
FET MS X RISK WBC.DNA+CFDNA QL: NOT DETECTED
FET SEX PLAS.CFDNA DOSAGE CFDNA: NORMAL
FET TS 13 RISK PLAS.CFDNA QL: NEGATIVE
FET X + Y ANEUP RISK PLAS.CFDNA SEQ-IMP: NOT DETECTED
GA EST FROM CONCEPTION DATE: NORMAL D
GESTATIONAL AGE > 9:: YES
LAB DIRECTOR NAME PROVIDER: NORMAL
LAB DIRECTOR NAME PROVIDER: NORMAL
LABORATORY COMMENT REPORT: NORMAL
LIMITATIONS OF THE TEST: NORMAL
NEGATIVE PREDICTIVE VALUE: NORMAL
PERFORMANCE CHARACTERISTICS: NORMAL
POSITIVE PREDICTIVE VALUE: NORMAL
REF LAB TEST METHOD: NORMAL
SL AMB NOTE:: NORMAL
TEST PERFORMANCE INFO SPEC: NORMAL

## 2024-12-30 NOTE — RESULT ENCOUNTER NOTE
I have reviewed the results of the NIPS which are low risk.  Please call patient and notify her of these reassuring results if she has not viewed on MyChart. Please ensure she is notified of recommendation of MSAFP to be ordered and followed up through her primary Obstetrician's office.      Thank you, Chu Edwards MD

## 2025-01-09 ENCOUNTER — ROUTINE PRENATAL (OUTPATIENT)
Dept: OBGYN CLINIC | Facility: CLINIC | Age: 40
End: 2025-01-09

## 2025-01-09 VITALS
RESPIRATION RATE: 18 BRPM | HEART RATE: 78 BPM | DIASTOLIC BLOOD PRESSURE: 85 MMHG | SYSTOLIC BLOOD PRESSURE: 138 MMHG | HEIGHT: 64 IN | BODY MASS INDEX: 29.71 KG/M2 | WEIGHT: 174 LBS

## 2025-01-09 DIAGNOSIS — G44.219 EPISODIC TENSION-TYPE HEADACHE, NOT INTRACTABLE: ICD-10-CM

## 2025-01-09 DIAGNOSIS — Z3A.16 16 WEEKS GESTATION OF PREGNANCY: ICD-10-CM

## 2025-01-09 PROBLEM — G44.209 ACUTE TENSION-TYPE HEADACHE: Status: ACTIVE | Noted: 2025-01-09

## 2025-01-09 PROBLEM — G44.209 ACUTE TENSION-TYPE HEADACHE: Status: RESOLVED | Noted: 2025-01-09 | Resolved: 2025-01-09

## 2025-01-09 PROCEDURE — 99214 OFFICE O/P EST MOD 30 MIN: CPT | Performed by: OBSTETRICS & GYNECOLOGY

## 2025-01-09 RX ORDER — CALCIUM CARBONATE/VITAMIN D3 500-10/5ML
400 LIQUID (ML) ORAL DAILY
Qty: 90 TABLET | Refills: 2 | Status: SHIPPED | OUTPATIENT
Start: 2025-01-09

## 2025-01-09 RX ORDER — METOCLOPRAMIDE 10 MG/1
10 TABLET ORAL 2 TIMES DAILY PRN
Qty: 120 TABLET | Refills: 0 | Status: SHIPPED | OUTPATIENT
Start: 2025-01-09

## 2025-01-09 NOTE — PROGRESS NOTES
[facility]   PRENATAL VISIT  Reddy Garcia  [id number]  1985        A/P:    Problem List       16 weeks gestation of pregnancy    Overview   Overview:  Labs  Pap smear done in [facility] 6 mos, wnl per pt and GC/CT neg  Prenatal panel-reminded to have done  MSAFP ordered on 25  NIPS low risk  28w labs [ ]  Vaccines:  Flu vaccine: given 24  Covid vaccine: 3 doses two years ago  Tdap vaccine: offer at 28 weeks  Contraception: POPs  Feeding plan: breastfeeding  Birth plan: , open to epidural  Delivery consent: to be signed at 28w  Ultrasounds: Level 1 wnl, Level II scheduled for 25         Primigravida of advanced maternal age in first trimester    Episodic tension-type headache    Overview   Started at 15 weeks, no vision changes, RUQ pain, edema. Tried one tablet Tylenol with minimal relief  - Magnesium oxide daily for prevention  - Tylenol 1g and Reglan when headache occurs  - Continue adequate hydration               S: 39 y.o.  16w3d here for PN visit. She has no contractions. She has no leakage of fluid and vaginal bleeding. She has not yet felt any fetal movement.     O:  Vitals:    25 1548   BP: 138/85   Pulse: 78   Resp: 18     Physical Exam  HENT:      Head: Normocephalic.   Eyes:      Conjunctiva/sclera: Conjunctivae normal.   Cardiovascular:      Rate and Rhythm: Normal rate.      Pulses: Normal pulses.   Pulmonary:      Effort: Pulmonary effort is normal.   Abdominal:      Palpations: Abdomen is soft.      Tenderness: There is no abdominal tenderness.   Skin:     General: Skin is warm.   Neurological:      Mental Status: She is alert and oriented to person, place, and time.   Psychiatric:         Mood and Affect: Mood normal.         Fetal Heart Rate: 142       Camron Parson MD  PGY-4  2025  4:57 PM

## 2025-01-26 ENCOUNTER — APPOINTMENT (OUTPATIENT)
Dept: LAB | Facility: CLINIC | Age: 40
End: 2025-01-26
Payer: COMMERCIAL

## 2025-01-26 DIAGNOSIS — Z3A.01 7 WEEKS GESTATION OF PREGNANCY: ICD-10-CM

## 2025-01-26 DIAGNOSIS — Z3A.16 16 WEEKS GESTATION OF PREGNANCY: ICD-10-CM

## 2025-01-26 LAB
ABO GROUP BLD: NORMAL
BACTERIA UR QL AUTO: ABNORMAL /HPF
BASOPHILS # BLD AUTO: 0.06 THOUSANDS/ΜL (ref 0–0.1)
BASOPHILS NFR BLD AUTO: 1 % (ref 0–1)
BILIRUB UR QL STRIP: NEGATIVE
BLD GP AB SCN SERPL QL: NEGATIVE
CLARITY UR: ABNORMAL
COLOR UR: YELLOW
EOSINOPHIL # BLD AUTO: 0.2 THOUSAND/ΜL (ref 0–0.61)
EOSINOPHIL NFR BLD AUTO: 2 % (ref 0–6)
ERYTHROCYTE [DISTWIDTH] IN BLOOD BY AUTOMATED COUNT: 12.5 % (ref 11.6–15.1)
GLUCOSE UR STRIP-MCNC: NEGATIVE MG/DL
HCT VFR BLD AUTO: 37.4 % (ref 34.8–46.1)
HGB BLD-MCNC: 12.5 G/DL (ref 11.5–15.4)
HGB UR QL STRIP.AUTO: NEGATIVE
IMM GRANULOCYTES # BLD AUTO: 0.05 THOUSAND/UL (ref 0–0.2)
IMM GRANULOCYTES NFR BLD AUTO: 1 % (ref 0–2)
KETONES UR STRIP-MCNC: NEGATIVE MG/DL
LEUKOCYTE ESTERASE UR QL STRIP: ABNORMAL
LYMPHOCYTES # BLD AUTO: 1.58 THOUSANDS/ΜL (ref 0.6–4.47)
LYMPHOCYTES NFR BLD AUTO: 16 % (ref 14–44)
MCH RBC QN AUTO: 30.9 PG (ref 26.8–34.3)
MCHC RBC AUTO-ENTMCNC: 33.4 G/DL (ref 31.4–37.4)
MCV RBC AUTO: 93 FL (ref 82–98)
MONOCYTES # BLD AUTO: 0.49 THOUSAND/ΜL (ref 0.17–1.22)
MONOCYTES NFR BLD AUTO: 5 % (ref 4–12)
MUCOUS THREADS UR QL AUTO: ABNORMAL
NEUTROPHILS # BLD AUTO: 7.68 THOUSANDS/ΜL (ref 1.85–7.62)
NEUTS SEG NFR BLD AUTO: 75 % (ref 43–75)
NITRITE UR QL STRIP: NEGATIVE
NON-SQ EPI CELLS URNS QL MICRO: ABNORMAL /HPF
NRBC BLD AUTO-RTO: 0 /100 WBCS
PH UR STRIP.AUTO: 5.5 [PH]
PLATELET # BLD AUTO: 265 THOUSANDS/UL (ref 149–390)
PMV BLD AUTO: 10.6 FL (ref 8.9–12.7)
PROT UR STRIP-MCNC: ABNORMAL MG/DL
RBC # BLD AUTO: 4.04 MILLION/UL (ref 3.81–5.12)
RBC #/AREA URNS AUTO: ABNORMAL /HPF
RH BLD: NEGATIVE
RUBV IGG SERPL IA-ACNC: 86.8 IU/ML
SP GR UR STRIP.AUTO: 1.03 (ref 1–1.03)
SPECIMEN EXPIRATION DATE: NORMAL
UROBILINOGEN UR STRIP-ACNC: <2 MG/DL
WBC # BLD AUTO: 10.06 THOUSAND/UL (ref 4.31–10.16)
WBC #/AREA URNS AUTO: ABNORMAL /HPF

## 2025-01-26 PROCEDURE — 82105 ALPHA-FETOPROTEIN SERUM: CPT

## 2025-01-26 PROCEDURE — 36415 COLL VENOUS BLD VENIPUNCTURE: CPT

## 2025-01-26 PROCEDURE — 81001 URINALYSIS AUTO W/SCOPE: CPT

## 2025-01-26 PROCEDURE — 85025 COMPLETE CBC W/AUTO DIFF WBC: CPT

## 2025-01-26 PROCEDURE — 86706 HEP B SURFACE ANTIBODY: CPT

## 2025-01-26 PROCEDURE — 86762 RUBELLA ANTIBODY: CPT

## 2025-01-26 PROCEDURE — 86901 BLOOD TYPING SEROLOGIC RH(D): CPT

## 2025-01-26 PROCEDURE — 86850 RBC ANTIBODY SCREEN: CPT

## 2025-01-26 PROCEDURE — 87086 URINE CULTURE/COLONY COUNT: CPT

## 2025-01-26 PROCEDURE — 87340 HEPATITIS B SURFACE AG IA: CPT

## 2025-01-26 PROCEDURE — 86900 BLOOD TYPING SEROLOGIC ABO: CPT

## 2025-01-26 PROCEDURE — 87389 HIV-1 AG W/HIV-1&-2 AB AG IA: CPT

## 2025-01-26 PROCEDURE — 86803 HEPATITIS C AB TEST: CPT

## 2025-01-26 PROCEDURE — 86787 VARICELLA-ZOSTER ANTIBODY: CPT

## 2025-01-26 PROCEDURE — 86780 TREPONEMA PALLIDUM: CPT

## 2025-01-27 LAB
BACTERIA UR CULT: NORMAL
HBV SURFACE AB SER-ACNC: 5.6 MIU/ML
HBV SURFACE AG SER QL: NORMAL
HCV AB SER QL: NORMAL
HIV 1+2 AB+HIV1 P24 AG SERPL QL IA: NORMAL
HIV 2 AB SERPL QL IA: NORMAL
HIV1 AB SERPL QL IA: NORMAL
HIV1 P24 AG SERPL QL IA: NORMAL
TREPONEMA PALLIDUM IGG+IGM AB [PRESENCE] IN SERUM OR PLASMA BY IMMUNOASSAY: NORMAL
VZV IGG SER QL IA: NORMAL

## 2025-01-30 LAB
2ND TRIMESTER 4 SCREEN SERPL-IMP: NORMAL
AFP ADJ MOM SERPL: 1.59
AFP INTERP AMN-IMP: NORMAL
AFP INTERP SERPL-IMP: NORMAL
AFP INTERP SERPL-IMP: NORMAL
AFP SERPL-MCNC: 69.8 NG/ML
AGE AT DELIVERY: 39.1 YR
GA METHOD: NORMAL
GA: 18.9 WEEKS
IDDM PATIENT QL: NO
MULTIPLE PREGNANCY: NO
NEURAL TUBE DEFECT RISK FETUS: 2155 %

## 2025-02-11 ENCOUNTER — ROUTINE PRENATAL (OUTPATIENT)
Dept: OBGYN CLINIC | Facility: CLINIC | Age: 40
End: 2025-02-11

## 2025-02-11 VITALS
WEIGHT: 183 LBS | HEART RATE: 85 BPM | HEIGHT: 64 IN | RESPIRATION RATE: 18 BRPM | BODY MASS INDEX: 31.24 KG/M2 | DIASTOLIC BLOOD PRESSURE: 80 MMHG | SYSTOLIC BLOOD PRESSURE: 130 MMHG

## 2025-02-11 DIAGNOSIS — Z3A.21 21 WEEKS GESTATION OF PREGNANCY: Primary | ICD-10-CM

## 2025-02-11 PROCEDURE — 99213 OFFICE O/P EST LOW 20 MIN: CPT | Performed by: OBSTETRICS & GYNECOLOGY

## 2025-02-12 NOTE — PROGRESS NOTES
[facility]   PRENATAL VISIT  Reddy Garcia  [id number]  1985        A/P:    Problem List       21 weeks gestation of pregnancy    Overview   Overview:  Labs  Pap smear done in [facility] 6 mos, wnl per pt and GC/CT neg  Prenatal panel wnl  MSAFP low risk  NIPS low risk  28w labs [ ]  Vaccines:  Flu vaccine: given 24  Covid vaccine: 3 doses two years ago  Tdap vaccine: offer at 28 weeks  Contraception: POPs  Feeding plan: breastfeeding  Birth plan: , open to epidural  Delivery consent: to be signed at 28w  Ultrasounds: Level 1 wnl, Level II scheduled for 25         Primigravida of advanced maternal age in first trimester    Episodic tension-type headache    Overview   Started at 15 weeks, no vision changes, RUQ pain, edema. Tried one tablet Tylenol with minimal relief  - Magnesium oxide daily for prevention  - Tylenol 1g and Reglan when headache occurs  - Continue adequate hydration               S: 39 y.o.  21w2d here for PN visit. She has no contractions. She has no leakage of fluid and vaginal bleeding. She has good fetal movement. She has gained 23 lbs in this pregnancy.     O:  Vitals:    25 1615   BP: 130/80   Pulse: 85   Resp: 18     Physical Exam  HENT:      Head: Normocephalic.   Eyes:      Conjunctiva/sclera: Conjunctivae normal.   Cardiovascular:      Rate and Rhythm: Normal rate.      Pulses: Normal pulses.   Pulmonary:      Effort: Pulmonary effort is normal.   Abdominal:      Palpations: Abdomen is soft.      Tenderness: There is no abdominal tenderness.   Skin:     General: Skin is warm.   Neurological:      Mental Status: She is alert and oriented to person, place, and time.         Fetal Heart Rate: 150         Camron Parson MD  PGY-4  2025  8:25 AM

## 2025-02-13 ENCOUNTER — ROUTINE PRENATAL (OUTPATIENT)
Dept: PERINATAL CARE | Facility: OTHER | Age: 40
End: 2025-02-13
Payer: COMMERCIAL

## 2025-02-13 VITALS
DIASTOLIC BLOOD PRESSURE: 72 MMHG | HEIGHT: 64 IN | BODY MASS INDEX: 31.58 KG/M2 | HEART RATE: 84 BPM | WEIGHT: 185 LBS | SYSTOLIC BLOOD PRESSURE: 130 MMHG

## 2025-02-13 DIAGNOSIS — O09.512 PRIMIGRAVIDA OF ADVANCED MATERNAL AGE IN SECOND TRIMESTER: ICD-10-CM

## 2025-02-13 DIAGNOSIS — Z3A.21 21 WEEKS GESTATION OF PREGNANCY: Primary | ICD-10-CM

## 2025-02-13 DIAGNOSIS — Z36.86 ENCOUNTER FOR ANTENATAL SCREENING FOR CERVICAL LENGTH: ICD-10-CM

## 2025-02-13 PROCEDURE — 99214 OFFICE O/P EST MOD 30 MIN: CPT | Performed by: OBSTETRICS & GYNECOLOGY

## 2025-02-13 PROCEDURE — 76817 TRANSVAGINAL US OBSTETRIC: CPT | Performed by: OBSTETRICS & GYNECOLOGY

## 2025-02-13 PROCEDURE — 76811 OB US DETAILED SNGL FETUS: CPT | Performed by: OBSTETRICS & GYNECOLOGY

## 2025-03-04 ENCOUNTER — ULTRASOUND (OUTPATIENT)
Dept: OBGYN CLINIC | Facility: CLINIC | Age: 40
End: 2025-03-04
Payer: MEDICARE

## 2025-03-04 VITALS — DIASTOLIC BLOOD PRESSURE: 66 MMHG | BODY MASS INDEX: 32.92 KG/M2 | WEIGHT: 191.8 LBS | SYSTOLIC BLOOD PRESSURE: 122 MMHG

## 2025-03-04 DIAGNOSIS — Z34.91 PRENATAL CARE IN FIRST TRIMESTER: ICD-10-CM

## 2025-03-04 DIAGNOSIS — M54.9 BACK PAIN IN PREGNANCY: ICD-10-CM

## 2025-03-04 DIAGNOSIS — O99.891 BACK PAIN IN PREGNANCY: ICD-10-CM

## 2025-03-04 DIAGNOSIS — Z11.3 SCREENING FOR STD (SEXUALLY TRANSMITTED DISEASE): ICD-10-CM

## 2025-03-04 DIAGNOSIS — Z3A.24 24 WEEKS GESTATION OF PREGNANCY: Primary | ICD-10-CM

## 2025-03-04 DIAGNOSIS — O09.512 PRIMIGRAVIDA OF ADVANCED MATERNAL AGE IN SECOND TRIMESTER: ICD-10-CM

## 2025-03-04 LAB
SL AMB  POCT GLUCOSE, UA: NORMAL
SL AMB POCT URINE PROTEIN: NORMAL

## 2025-03-04 PROCEDURE — 99214 OFFICE O/P EST MOD 30 MIN: CPT | Performed by: OBSTETRICS & GYNECOLOGY

## 2025-03-04 PROCEDURE — 81002 URINALYSIS NONAUTO W/O SCOPE: CPT | Performed by: OBSTETRICS & GYNECOLOGY

## 2025-03-04 NOTE — ASSESSMENT & PLAN NOTE
Reviewed weight gain expectations. She has gained almost over 50 lbs. Strongly recommend dietary modifications. 26-28 wk labs ordered. Ordered type and screen because she believes that she is O+ in [facility]. We discussed that maybe she had weak D in [place]; however, will order type and screen. Discussed MOA of rhogam which she is amenable to if still Rh negative. Oriented her to our practice

## 2025-03-04 NOTE — PROGRESS NOTES
Problem List Items Addressed This Visit     24 weeks gestation of pregnancy - Primary    Reviewed weight gain expectations. She has gained almost over 50 lbs. Strongly recommend dietary modifications. 26-28 wk labs ordered. Ordered type and screen because she believes that she is O+ in [facility]. We discussed that maybe she had weak D in [facility]; however, will order type and screen. Discussed MOA of rhogam which she is amenable to if still Rh negative. Oriented her to our practice         Relevant Orders    POCT urine dip    Type and screen    Anemia Panel w/Reflex, OB    CBC and differential    Glucose, 1H PG    Primigravida of advanced maternal age in second trimester    Recommend LDA. Has growth US ordered         Back pain in pregnancy    Left sciatica. Reviewed recommendation for maternity support band, supportive shoes, and physical therapy. She desires referral.          Relevant Orders    Ambulatory Referral to Physical Therapy   Other Visit Diagnoses       Prenatal care in first trimester        Relevant Orders    POCT urine dip      Screening for STD (sexually transmitted disease)        Relevant Orders    RPR-Syphilis Screening (Total Syphilis IGG/IGM)        Pre-Doreen Vitals    Flowsheet Row Most Recent Value   Prenatal Assessment    Fetal Heart Rate 145   Fundal Height (cm) 24 cm   Movement Present   Prenatal Vitals    Blood Pressure 122/66   Weight - Scale 87 kg (191 lb 12.8 oz)   Urine Albumin/Glucose    Dilation/Effacement/Station    Vaginal Drainage    Edema         No point ttp; pain radiating back to left buttocks

## 2025-03-04 NOTE — ASSESSMENT & PLAN NOTE
Left sciatica. Reviewed recommendation for maternity support band, supportive shoes, and physical therapy. She desires referral.

## 2025-03-25 ENCOUNTER — HOSPITAL ENCOUNTER (OUTPATIENT)
Facility: HOSPITAL | Age: 40
Discharge: HOME | End: 2025-03-25
Attending: OBSTETRICS & GYNECOLOGY | Admitting: OBSTETRICS & GYNECOLOGY
Payer: MEDICARE

## 2025-03-25 VITALS
OXYGEN SATURATION: 98 % | TEMPERATURE: 98.1 F | HEIGHT: 64 IN | SYSTOLIC BLOOD PRESSURE: 118 MMHG | WEIGHT: 190 LBS | DIASTOLIC BLOOD PRESSURE: 74 MMHG | BODY MASS INDEX: 32.44 KG/M2 | HEART RATE: 67 BPM | RESPIRATION RATE: 20 BRPM

## 2025-03-25 DIAGNOSIS — M54.9 BACK PAIN IN PREGNANCY: Primary | ICD-10-CM

## 2025-03-25 DIAGNOSIS — O99.891 BACK PAIN IN PREGNANCY: Primary | ICD-10-CM

## 2025-03-25 PROBLEM — R51.9 HEADACHE: Status: ACTIVE | Noted: 2025-03-25

## 2025-03-25 PROBLEM — R03.0 ELEVATED BLOOD PRESSURE READING WITHOUT DIAGNOSIS OF HYPERTENSION: Status: ACTIVE | Noted: 2025-03-25

## 2025-03-25 PROBLEM — Z3A.27 27 WEEKS GESTATION OF PREGNANCY: Status: ACTIVE | Noted: 2024-12-12

## 2025-03-25 PROBLEM — R51.9 HEADACHE: Chronic | Status: ACTIVE | Noted: 2025-03-25

## 2025-03-25 LAB
ALBUMIN SERPL BCG-MCNC: 3.3 G/DL (ref 3.5–5)
ALP SERPL-CCNC: 57 U/L (ref 34–104)
ALT SERPL W P-5'-P-CCNC: 15 U/L (ref 7–52)
ANION GAP SERPL CALCULATED.3IONS-SCNC: 7 MMOL/L (ref 4–13)
AST SERPL W P-5'-P-CCNC: 16 U/L (ref 13–39)
BILIRUB SERPL-MCNC: 0.23 MG/DL (ref 0.2–1)
BUN SERPL-MCNC: 10 MG/DL (ref 5–25)
CALCIUM ALBUM COR SERPL-MCNC: 9.2 MG/DL (ref 8.3–10.1)
CALCIUM SERPL-MCNC: 8.6 MG/DL (ref 8.4–10.2)
CHLORIDE SERPL-SCNC: 103 MMOL/L (ref 96–108)
CO2 SERPL-SCNC: 23 MMOL/L (ref 21–32)
CREAT SERPL-MCNC: 0.44 MG/DL (ref 0.6–1.3)
CREAT UR-MCNC: 118 MG/DL
ERYTHROCYTE [DISTWIDTH] IN BLOOD BY AUTOMATED COUNT: 12.5 % (ref 11.6–15.1)
GFR SERPL CREATININE-BSD FRML MDRD: 128 ML/MIN/1.73SQ M
GLUCOSE SERPL-MCNC: 78 MG/DL (ref 65–140)
HCT VFR BLD AUTO: 37 % (ref 34.8–46.1)
HGB BLD-MCNC: 12.1 G/DL (ref 11.5–15.4)
MCH RBC QN AUTO: 30.4 PG (ref 26.8–34.3)
MCHC RBC AUTO-ENTMCNC: 32.7 G/DL (ref 31.4–37.4)
MCV RBC AUTO: 93 FL (ref 82–98)
PLATELET # BLD AUTO: 261 THOUSANDS/UL (ref 149–390)
PMV BLD AUTO: 10.5 FL (ref 8.9–12.7)
POTASSIUM SERPL-SCNC: 4 MMOL/L (ref 3.5–5.3)
PROT SERPL-MCNC: 6.5 G/DL (ref 6.4–8.4)
PROT UR-MCNC: 14 MG/DL
PROT/CREAT UR: 0.1 MG/G{CREAT} (ref 0–0.1)
RBC # BLD AUTO: 3.98 MILLION/UL (ref 3.81–5.12)
SODIUM SERPL-SCNC: 133 MMOL/L (ref 135–147)
WBC # BLD AUTO: 9.9 THOUSAND/UL (ref 4.31–10.16)

## 2025-03-25 PROCEDURE — 80053 COMPREHEN METABOLIC PANEL: CPT

## 2025-03-25 PROCEDURE — 85027 COMPLETE CBC AUTOMATED: CPT

## 2025-03-25 PROCEDURE — 84156 ASSAY OF PROTEIN URINE: CPT

## 2025-03-25 PROCEDURE — 82570 ASSAY OF URINE CREATININE: CPT

## 2025-03-25 PROCEDURE — 99213 OFFICE O/P EST LOW 20 MIN: CPT

## 2025-03-25 RX ORDER — CYCLOBENZAPRINE HCL 10 MG
10 TABLET ORAL 3 TIMES DAILY PRN
Status: DISCONTINUED | OUTPATIENT
Start: 2025-03-25 | End: 2025-03-25 | Stop reason: HOSPADM

## 2025-03-25 RX ORDER — LANOLIN ALCOHOL/MO/W.PET/CERES
400 CREAM (GRAM) TOPICAL ONCE
Status: COMPLETED | OUTPATIENT
Start: 2025-03-25 | End: 2025-03-25

## 2025-03-25 RX ORDER — DIPHENHYDRAMINE HCL 25 MG
25 TABLET ORAL ONCE
Status: COMPLETED | OUTPATIENT
Start: 2025-03-25 | End: 2025-03-25

## 2025-03-25 RX ORDER — CYCLOBENZAPRINE HCL 10 MG
10 TABLET ORAL 3 TIMES DAILY PRN
Qty: 20 TABLET | Refills: 0 | Status: SHIPPED | OUTPATIENT
Start: 2025-03-25

## 2025-03-25 RX ORDER — METOCLOPRAMIDE 10 MG/1
10 TABLET ORAL ONCE
Status: COMPLETED | OUTPATIENT
Start: 2025-03-25 | End: 2025-03-25

## 2025-03-25 RX ORDER — ACETAMINOPHEN 325 MG/1
975 TABLET ORAL ONCE
Status: COMPLETED | OUTPATIENT
Start: 2025-03-25 | End: 2025-03-25

## 2025-03-25 RX ADMIN — Medication 400 MG: at 13:52

## 2025-03-25 RX ADMIN — CYCLOBENZAPRINE 10 MG: 10 TABLET, FILM COATED ORAL at 11:11

## 2025-03-25 RX ADMIN — METOCLOPRAMIDE 10 MG: 10 TABLET ORAL at 13:20

## 2025-03-25 RX ADMIN — DIPHENHYDRAMINE HYDROCHLORIDE 25 MG: 25 TABLET ORAL at 13:20

## 2025-03-25 RX ADMIN — ACETAMINOPHEN 975 MG: 325 TABLET, FILM COATED ORAL at 11:10

## 2025-03-25 NOTE — LETTER
[facility]  [address] [address]  Dept: [phone]    March 25, 2025     Patient: Reddy Garcia   YOB: 1985   Date of Visit: 3/25/2025       To Whom it May Concern:    Reddy Garcia is under my professional care. She was seen in the hospital from 3/25/2025 to 03/25/25. She may return to work on 3/27/25 without limitations.    If you have any questions or concerns, please don't hesitate to call.         Sincerely,          Alethea Tarango MD

## 2025-03-25 NOTE — ASSESSMENT & PLAN NOTE
/66 on presentation to ED. No elevated BPs in triage. CBC/CMP wnl, P:C 0.1. Patient without symptoms concerning for preeclampsia at this time  - continue to monitor BP at prenatal visits

## 2025-03-25 NOTE — ASSESSMENT & PLAN NOTE
No obstetric complaints. FHT reactive, no ctx on toco. Maternal and fetal status overall reassuring at this time  - continue routine prenatal care  - return precautions given

## 2025-03-25 NOTE — PROGRESS NOTES
L&D Triage Note - OB/GYN  Reddy Garcia 39 y.o. female MRN: [id number]  Unit/Bed#: LD TRIAGE  Encounter: [id number]    Patient is seen by SLOGA    ASSESSMENT  Reddy Garcia is a 39 y.o.  at 27w1d who presented for headache and leg pain. She is stable with workup not suggestive of preeclampsia at this time.    * Headache  Assessment & Plan  Patient presenting with headache x1day, similar in character to pre-pregnancy migraines. Headache resolved in triage after Tylenol, benadryl, magnesium, metoclopramide. No visual disturbance or right upper quadrant pain; low concern for preeclampsia (see Elevated BP Problem below). Safe for d/c home    Elevated blood pressure reading without diagnosis of hypertension  Assessment & Plan  /66 on presentation to ED. No elevated BPs in triage. CBC/CMP wnl, P:C 0.1. Patient without symptoms concerning for preeclampsia at this time  - continue to monitor BP at prenatal visits    Back and leg pain in pregnancy  Assessment & Plan  Left back and leg pain x2-3wks, likely 2/2 nerve compression in the setting of increasing gestational age. Pain improved with Flexeril, heating pad in triage  - recommend continued supportive care at home with heating pad, stretches  - Tylenol, Flexeril as needed  - physical therapy consult placed by prior OB provider; pt to follow up    27 weeks gestation of pregnancy  Assessment & Plan  No obstetric complaints. FHT reactive, no ctx on toco. Maternal and fetal status overall reassuring at this time  - continue routine prenatal care  - return precautions given      #Discharge instructions  - patient instructed to call/return if experiencing worsening contractions, vaginal bleeding, loss of fluid, or decreased fetal movement  - next OB appointment: 25    Patient discussed with attending physician Dr. Davis  ______________    SUBJECTIVE  MARIA TERESA: Estimated Date of Delivery: 25  HPI:  39 y.o.  @27w1d with history of  "migraines, inguinal hernia repair presents with complaint of headache, leg pain. This pregnancy is complicated by advanced maternal age.     Reports episode of dizziness (resolved) this AM, accompanied by diaphoresis, visual scotoma (resolved), and onset of global headache, which is now worsening. Headache is described as sensation of \"pressure\" and is similar to her pre-pregnancy migraines. She has not tried anything for the headache. She denies right upper quadrant pain.    Also reports 2-3wks of left leg pain. Pain starts at lower back and radiates to posterior upper thigh; is worse with movement.    She denies contractions, vaginal bleeding, leakage of fluid, or decreased fetal movement.    OBJECTIVE:  Vitals:  /74 (BP Location: Right arm)   Pulse 67   Temp 98.1 °F (36.7 °C) (Axillary)   Resp 20   Ht 5' 4\" (1.626 m)   Wt 86.2 kg (190 lb)   LMP 09/16/2024 (Exact Date)   SpO2 98%   BMI 32.61 kg/m²   Body mass index is 32.61 kg/m².    Physical Exam  Vitals reviewed.   Constitutional:       General: She is not in acute distress.     Appearance: She is well-developed.   HENT:      Head: Normocephalic and atraumatic.   Cardiovascular:      Rate and Rhythm: Normal rate.   Pulmonary:      Effort: Pulmonary effort is normal. No respiratory distress.   Musculoskeletal:      Right lower leg: No edema.      Left lower leg: No edema.   Skin:     Findings: No rash (on exposed skin).   Neurological:      Mental Status: She is alert and oriented to person, place, and time.   Psychiatric:         Mood and Affect: Affect normal.         Speech: Speech normal.         Behavior: Behavior normal.       FHT:  Baseline Rate (FHR): 135 bpm  Variability: Moderate  Accelerations: 10 x 10 (<32 weeks), At variable times  Decelerations: None  FHR Category: Category I    TOCO:   No ctx    Labs:   Recent Results (from the past 24 hours)   CBC and Platelet    Collection Time: 03/25/25 11:16 AM   Result Value Ref Range    WBC 9.90 " 4.31 - 10.16 Thousand/uL    RBC 3.98 3.81 - 5.12 Million/uL    Hemoglobin 12.1 11.5 - 15.4 g/dL    Hematocrit 37.0 34.8 - 46.1 %    MCV 93 82 - 98 fL    MCH 30.4 26.8 - 34.3 pg    MCHC 32.7 31.4 - 37.4 g/dL    RDW 12.5 11.6 - 15.1 %    Platelets 261 149 - 390 Thousands/uL    MPV 10.5 8.9 - 12.7 fL   Comprehensive metabolic panel    Collection Time: 03/25/25 11:16 AM   Result Value Ref Range    Sodium 133 (L) 135 - 147 mmol/L    Potassium 4.0 3.5 - 5.3 mmol/L    Chloride 103 96 - 108 mmol/L    CO2 23 21 - 32 mmol/L    ANION GAP 7 4 - 13 mmol/L    BUN 10 5 - 25 mg/dL    Creatinine 0.44 (L) 0.60 - 1.30 mg/dL    Glucose 78 65 - 140 mg/dL    Calcium 8.6 8.4 - 10.2 mg/dL    Corrected Calcium 9.2 8.3 - 10.1 mg/dL    AST 16 13 - 39 U/L    ALT 15 7 - 52 U/L    Alkaline Phosphatase 57 34 - 104 U/L    Total Protein 6.5 6.4 - 8.4 g/dL    Albumin 3.3 (L) 3.5 - 5.0 g/dL    Total Bilirubin 0.23 0.20 - 1.00 mg/dL    eGFR 128 ml/min/1.73sq m   Protein / creatinine ratio, urine    Collection Time: 03/25/25 11:16 AM   Result Value Ref Range    Creatinine, Ur 118.0 Reference range not established. mg/dL    Protein Urine Random 14.0 Reference range not established. mg/dL    Prot/Creat Ratio, Ur 0.1 0.0 - 0.1     Alethea Tarango MD  OBGYN PGY-1  03/25/25  4:37 PM

## 2025-03-25 NOTE — ASSESSMENT & PLAN NOTE
Patient presenting with headache x1day, similar in character to pre-pregnancy migraines. Headache resolved in triage after Tylenol, benadryl, magnesium, metoclopramide. No visual disturbance or right upper quadrant pain; low concern for preeclampsia (see Elevated BP Problem below). Safe for d/c home

## 2025-03-25 NOTE — ASSESSMENT & PLAN NOTE
Left back and leg pain x2-3wks, likely 2/2 nerve compression in the setting of increasing gestational age. Pain improved with Flexeril, heating pad in triage  - recommend continued supportive care at home with heating pad, stretches  - Tylenol, Flexeril as needed  - physical therapy consult placed by prior OB provider; pt to follow up

## 2025-03-26 ENCOUNTER — RESULTS FOLLOW-UP (OUTPATIENT)
Dept: OBGYN CLINIC | Facility: CLINIC | Age: 40
End: 2025-03-26

## 2025-03-26 ENCOUNTER — APPOINTMENT (OUTPATIENT)
Dept: LAB | Facility: CLINIC | Age: 40
End: 2025-03-26
Payer: MEDICARE

## 2025-03-26 DIAGNOSIS — Z3A.24 24 WEEKS GESTATION OF PREGNANCY: ICD-10-CM

## 2025-03-26 DIAGNOSIS — Z11.3 SCREENING FOR STD (SEXUALLY TRANSMITTED DISEASE): ICD-10-CM

## 2025-03-26 LAB
ABO GROUP BLD: NORMAL
BASOPHILS # BLD AUTO: 0.06 THOUSANDS/ÂΜL (ref 0–0.1)
BASOPHILS NFR BLD AUTO: 1 % (ref 0–1)
BLD GP AB SCN SERPL QL: NEGATIVE
EOSINOPHIL # BLD AUTO: 0.13 THOUSAND/ÂΜL (ref 0–0.61)
EOSINOPHIL NFR BLD AUTO: 1 % (ref 0–6)
ERYTHROCYTE [DISTWIDTH] IN BLOOD BY AUTOMATED COUNT: 12.6 % (ref 11.6–15.1)
GLUCOSE 1H P 50 G GLC PO SERPL-MCNC: 90 MG/DL (ref 70–134)
HCT VFR BLD AUTO: 37.9 % (ref 34.8–46.1)
HGB BLD-MCNC: 12.6 G/DL (ref 11.5–15.4)
IMM GRANULOCYTES # BLD AUTO: 0.05 THOUSAND/UL (ref 0–0.2)
IMM GRANULOCYTES NFR BLD AUTO: 0 % (ref 0–2)
LYMPHOCYTES # BLD AUTO: 1.54 THOUSANDS/ÂΜL (ref 0.6–4.47)
LYMPHOCYTES NFR BLD AUTO: 14 % (ref 14–44)
MCH RBC QN AUTO: 30.6 PG (ref 26.8–34.3)
MCHC RBC AUTO-ENTMCNC: 33.2 G/DL (ref 31.4–37.4)
MCV RBC AUTO: 92 FL (ref 82–98)
MONOCYTES # BLD AUTO: 0.59 THOUSAND/ÂΜL (ref 0.17–1.22)
MONOCYTES NFR BLD AUTO: 5 % (ref 4–12)
NEUTROPHILS # BLD AUTO: 9.07 THOUSANDS/ÂΜL (ref 1.85–7.62)
NEUTS SEG NFR BLD AUTO: 79 % (ref 43–75)
NRBC BLD AUTO-RTO: 0 /100 WBCS
PLATELET # BLD AUTO: 264 THOUSANDS/UL (ref 149–390)
PMV BLD AUTO: 10 FL (ref 8.9–12.7)
RBC # BLD AUTO: 4.12 MILLION/UL (ref 3.81–5.12)
RH BLD: NEGATIVE
SPECIMEN EXPIRATION DATE: NORMAL
WBC # BLD AUTO: 11.44 THOUSAND/UL (ref 4.31–10.16)

## 2025-03-26 PROCEDURE — 82950 GLUCOSE TEST: CPT

## 2025-03-26 PROCEDURE — 85025 COMPLETE CBC W/AUTO DIFF WBC: CPT

## 2025-03-26 PROCEDURE — 36415 COLL VENOUS BLD VENIPUNCTURE: CPT

## 2025-03-26 PROCEDURE — 86850 RBC ANTIBODY SCREEN: CPT

## 2025-03-26 PROCEDURE — 86780 TREPONEMA PALLIDUM: CPT

## 2025-03-26 PROCEDURE — 86901 BLOOD TYPING SEROLOGIC RH(D): CPT

## 2025-03-26 PROCEDURE — 86900 BLOOD TYPING SEROLOGIC ABO: CPT

## 2025-03-27 LAB — TREPONEMA PALLIDUM IGG+IGM AB [PRESENCE] IN SERUM OR PLASMA BY IMMUNOASSAY: NORMAL

## 2025-03-31 NOTE — ASSESSMENT & PLAN NOTE
BP Readings from Last 3 Encounters:   04/04/25 114/70   04/01/25 102/62   03/25/25 118/74       I have discussed the symptoms of pre-eclampsia and the following reasons to call the office:   Stomach pain, especially in the right upper quadrant  Headaches  Vision Changes  Swelling in your face and hands  Weight gain of more than 5 pounds in a week

## 2025-03-31 NOTE — ASSESSMENT & PLAN NOTE
- Continue PNV  - Labs: up to date  -Pap: 07/26/2024 in [place] per pt  -Rh: neg  - Genetics: neg NIPT, neg AFP  - Ultrasounds: NT neg; level II WNL, needs 32 week US  - Tdap: given 04/04/2025  - Flu Shot: Will offer in season  - RSV:  Will offer during season @ [id number]   - Rhogam: given 04/04/2025  - Delivery:  37 weeks  - Contraception: TBD  - Breastfeeding: breast pump ordered  - Pediatrician: referral given  -Delivery Consent & Packet: Consent signed 04/04/2025 and packet reviewed  -GBS: 36 weeks  - RTO in 2 weeks      - The patient has completed her 28 week labs and they have been reviewed.    - Patient's blood type is O, and Rhogam was given at this appointment.    - I have given the patient the third trimester OB packet with instructions to review and discuss it with her partner, complete it and bring it with her to the labor and delivery.    - Discussed laying hands flat at night and to continue to monitor pain. May benefit from wrist brace if symptoms worsen.    - Recommend leg elevation and compression stocking to aid in prevention of edema of lower legs.    - Warning signs in pregnancy:  I have reviewed the warning signs of pregnancy in the third trimester and advised patient to notify provider immediately if she experiences any of the following:  vaginal bleeding, baby moving less than normal or not at all, leakage of fluid, or abdominal pain.        Orders:    POCT urine dip

## 2025-03-31 NOTE — PROGRESS NOTES
Name: Reddy Gracia      : 1985      MRN: [id number]  Encounter Provider: Shahana Roque PA-C  Encounter Date: 2025   Encounter department: [facility] [place]  :  Assessment & Plan  28 weeks gestation of pregnancy   - Continue PNV  - Labs: up to date  -Pap: 2024 in [place] per pt  -Rh: neg  - Genetics: neg NIPT, neg AFP  - Ultrasounds: NT neg; level II WNL, needs 32 week US  - Tdap: given 2025  - Flu Shot: Will offer in season  - RSV:  Will offer during season @ [id number]   - Rhogam: given 2025  - Delivery:  37 weeks  - Contraception: TBD  - Breastfeeding: breast pump ordered  - Pediatrician: referral given  -Delivery Consent & Packet: Consent signed 2025 and packet reviewed  -GBS: 36 weeks  - RTO in 2 weeks      - The patient has completed her 28 week labs and they have been reviewed.    - Patient's blood type is O, and Rhogam was given at this appointment.    - I have given the patient the third trimester OB packet with instructions to review and discuss it with her partner, complete it and bring it with her to the labor and delivery.    - Discussed laying hands flat at night and to continue to monitor pain. May benefit from wrist brace if symptoms worsen.    - Recommend leg elevation and compression stocking to aid in prevention of edema of lower legs.    - Warning signs in pregnancy:  I have reviewed the warning signs of pregnancy in the third trimester and advised patient to notify provider immediately if she experiences any of the following:  vaginal bleeding, baby moving less than normal or not at all, leakage of fluid, or abdominal pain.        Orders:    POCT urine dip    Gestational hypertension, second trimester  BP Readings from Last 3 Encounters:   25 114/70   25 102/62   25 118/74   - Well controlled per review in Epic.  Mildly elevated by report at home.  - Patient previously taking BP  at different times during day  - Information sheet given in [place]ian with blood pressure log. Pt advised to take BP at around same time daily in sitting position with feet on the floor.  She does not need to take BP multiple times a day.  - Advised to bring in log at next appointment  - Continue ASA  - reviewed delivery recommended at 37 weeks. She was concerned about pre-term delivery. Explained that she would be considered a term pregnancy at that time.  Briefly reviewed induction of labor. Explained that patient would be scheduled for induction later in pregnancy.  - Reviewed pre-eclampsia s/s including worsening HA despite taking Tylenol for analgesic, RUQ pain, vision changes.  - Reviewed 32 week growth Ultrasound. Appointment has been made.  - Blood work and urine ordered.    Orders:    Comprehensive metabolic panel; Future    Protein / creatinine ratio, urine; Future    CBC and differential; Future    Pregnancy headache, antepartum  - Advised pt to continue to take Magnesium Oxide daily  - Start to take Tylenol 650 mg when ORTEGA first presents for better management.  HA should resolve in 30 minutes - 1 hour after medication. If headache becomes more intense, then call office or go to ER.  Do not take more than 3000 mg of Tylenol per day  Orders:    Comprehensive metabolic panel; Future    Protein / creatinine ratio, urine; Future    CBC and differential; Future    Primigravida of advanced maternal age in second trimester         Rh negative status during pregnancy in third trimester    Orders:    Rho(D) immune globulin (RHOGAM ULTRA-FILTERED PLUS) IM injection 300 mcg    Encounter for immunization    Orders:    Tdap Vaccine greater than or equal to 8yo    Expectant parent prebirth pediatrician visit    Orders:    Ambulatory Referral to Pediatrics; Future        History of Present Illness   HPI  Anyul presents today for her 28 week visit.  She is currently 28w0d.     Pt was seen in ED on 03/25/2025 for back pain  and HA.   - Pre-eclampsia labs neg at ER visit. Low risk for pre-eclampsia.   - Elevated blood pressure was noted and patient was to continue to monitor BP at prenatal visits   - PT was prescribed for patient prior to ER visit     Pt was seen in ER again on 04/01/2025 for evaluation of HA. Per ER note, pt had reported elevated BP readings at home.  - She was seen by L&D and work-up for pre-eclampsia was negative.  Pt had denied RUQ pain, vision change, CP, SOB    - Pt was encouraged to take Mg Oxide for HA prevention and Tylenol.    - Pt to monitor for pre-eclampsia symptoms     Pt states that she is still having HA, but they are milder  Reports following BP at home.  She is taking at different times per day.    - 04/04/2025: 127/79   - 04/03/2025: 134/81  - 04/02/2025: 131/81  - 04/01/2025: 140/81    She states that she will get HA when her blood pressures are elevated and when working.  She also had hand and leg swelling.  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  She has been drinking lots of water during the daytime.  She has episode today where she felt tired and needed to rest. She drank cold water.  She wonders if HA are due to stress.    Hand swelling stays the same throughout the day.  She states when contracts her hands, she can feel some tingling.    Denies RUQ, vision changes, CP.    Pt states that she was weighed at ER.       Blood Pressure:  BP Readings from Last 6 Encounters:   04/04/25 114/70   04/01/25 102/62   03/25/25 118/74   03/04/25 122/66   02/13/25 130/72   02/11/25 130/80       Pt blood type is 0 negative. Needs rhogam at this visit and Tdap.    32 week growth scan recommended by MGM    ROS:  - feels baby move: yes  - vaginal bleeding: No  - LOF: No  - Cramping: No  - Nausea/vomiting: Yes, describe: when she has HA  - HA: Yes, describe: see above  - Edema: Yes, describe: b/l hands and legs     Pt is not a smoker.    History obtained from: patient    Review of Systems      "  Objective   /70 (BP Location: Left arm, Patient Position: Sitting, Cuff Size: Standard)   Pulse 72   Ht 5' 4\" (1.626 m)   Wt 89.8 kg (198 lb)   LMP 2024 (Exact Date)   SpO2 99%   BMI 33.99 kg/m²      Pre-Doreen Vitals      Flowsheet Row Most Recent Value   Prenatal Assessment    Fetal Heart Rate 130   Fundal Height (cm) 28 cm   Movement Present   Prenatal Vitals    Blood Pressure 114/70   Weight - Scale 89.8 kg (198 lb)   Urine Albumin/Glucose    Dilation/Effacement/Station    Vaginal Drainage    Edema             Physical Exam  Constitutional:       General: She is not in acute distress.     Appearance: She is not ill-appearing.   HENT:      Head: Normocephalic and atraumatic.      Nose: Nose normal.   Eyes:      General: Lids are normal.      Extraocular Movements: Extraocular movements intact.   Abdominal:      Comments: Gravid abdomen    Skin:     General: Skin is cool and dry.   Neurological:      General: No focal deficit present.      Mental Status: She is alert and oriented to person, place, and time.   Psychiatric:         Attention and Perception: Attention normal.         Speech: Speech normal.           "

## 2025-04-01 ENCOUNTER — HOSPITAL ENCOUNTER (EMERGENCY)
Facility: HOSPITAL | Age: 40
Discharge: HOME | End: 2025-04-01
Attending: EMERGENCY MEDICINE | Admitting: OBSTETRICS & GYNECOLOGY
Payer: MEDICARE

## 2025-04-01 VITALS
TEMPERATURE: 98 F | BODY MASS INDEX: 32.44 KG/M2 | HEART RATE: 64 BPM | DIASTOLIC BLOOD PRESSURE: 62 MMHG | SYSTOLIC BLOOD PRESSURE: 102 MMHG | HEIGHT: 64 IN | RESPIRATION RATE: 18 BRPM | WEIGHT: 190 LBS | OXYGEN SATURATION: 98 %

## 2025-04-01 DIAGNOSIS — R51.9 ACUTE HEADACHE: Primary | ICD-10-CM

## 2025-04-01 DIAGNOSIS — G44.219 EPISODIC TENSION-TYPE HEADACHE, NOT INTRACTABLE: ICD-10-CM

## 2025-04-01 DIAGNOSIS — Z3A.01 7 WEEKS GESTATION OF PREGNANCY: ICD-10-CM

## 2025-04-01 DIAGNOSIS — R03.0 ELEVATED BLOOD PRESSURE READING: ICD-10-CM

## 2025-04-01 DIAGNOSIS — M79.89 LEG SWELLING: ICD-10-CM

## 2025-04-01 DIAGNOSIS — R51.9 HEADACHE: ICD-10-CM

## 2025-04-01 PROBLEM — Z3A.28 28 WEEKS GESTATION OF PREGNANCY: Status: ACTIVE | Noted: 2024-12-12

## 2025-04-01 PROBLEM — O13.9 GESTATIONAL HYPERTENSION: Status: ACTIVE | Noted: 2025-03-25

## 2025-04-01 LAB
ALBUMIN SERPL BCG-MCNC: 3.4 G/DL (ref 3.5–5)
ALP SERPL-CCNC: 62 U/L (ref 34–104)
ALT SERPL W P-5'-P-CCNC: 14 U/L (ref 7–52)
ANION GAP SERPL CALCULATED.3IONS-SCNC: 8 MMOL/L (ref 4–13)
AST SERPL W P-5'-P-CCNC: 15 U/L (ref 13–39)
BASOPHILS # BLD AUTO: 0.04 THOUSANDS/ÂΜL (ref 0–0.1)
BASOPHILS NFR BLD AUTO: 0 % (ref 0–1)
BILIRUB SERPL-MCNC: 0.25 MG/DL (ref 0.2–1)
BILIRUB UR QL STRIP: NEGATIVE
BUN SERPL-MCNC: 12 MG/DL (ref 5–25)
CALCIUM ALBUM COR SERPL-MCNC: 9.1 MG/DL (ref 8.3–10.1)
CALCIUM SERPL-MCNC: 8.6 MG/DL (ref 8.4–10.2)
CHLORIDE SERPL-SCNC: 103 MMOL/L (ref 96–108)
CLARITY UR: CLEAR
CO2 SERPL-SCNC: 21 MMOL/L (ref 21–32)
COLOR UR: COLORLESS
CREAT SERPL-MCNC: 0.49 MG/DL (ref 0.6–1.3)
CREAT UR-MCNC: 42.8 MG/DL
EOSINOPHIL # BLD AUTO: 0.18 THOUSAND/ÂΜL (ref 0–0.61)
EOSINOPHIL NFR BLD AUTO: 1 % (ref 0–6)
ERYTHROCYTE [DISTWIDTH] IN BLOOD BY AUTOMATED COUNT: 12.6 % (ref 11.6–15.1)
GFR SERPL CREATININE-BSD FRML MDRD: 123 ML/MIN/1.73SQ M
GLUCOSE SERPL-MCNC: 81 MG/DL (ref 65–140)
GLUCOSE UR STRIP-MCNC: NEGATIVE MG/DL
HCT VFR BLD AUTO: 36.9 % (ref 34.8–46.1)
HGB BLD-MCNC: 12.5 G/DL (ref 11.5–15.4)
HGB UR QL STRIP.AUTO: NEGATIVE
IMM GRANULOCYTES # BLD AUTO: 0.08 THOUSAND/UL (ref 0–0.2)
IMM GRANULOCYTES NFR BLD AUTO: 1 % (ref 0–2)
KETONES UR STRIP-MCNC: NEGATIVE MG/DL
LEUKOCYTE ESTERASE UR QL STRIP: NEGATIVE
LYMPHOCYTES # BLD AUTO: 2.05 THOUSANDS/ÂΜL (ref 0.6–4.47)
LYMPHOCYTES NFR BLD AUTO: 16 % (ref 14–44)
MCH RBC QN AUTO: 30.8 PG (ref 26.8–34.3)
MCHC RBC AUTO-ENTMCNC: 33.9 G/DL (ref 31.4–37.4)
MCV RBC AUTO: 91 FL (ref 82–98)
MONOCYTES # BLD AUTO: 0.6 THOUSAND/ÂΜL (ref 0.17–1.22)
MONOCYTES NFR BLD AUTO: 5 % (ref 4–12)
NEUTROPHILS # BLD AUTO: 9.55 THOUSANDS/ÂΜL (ref 1.85–7.62)
NEUTS SEG NFR BLD AUTO: 77 % (ref 43–75)
NITRITE UR QL STRIP: NEGATIVE
NRBC BLD AUTO-RTO: 0 /100 WBCS
PH UR STRIP.AUTO: 5.5 [PH]
PLATELET # BLD AUTO: 263 THOUSANDS/UL (ref 149–390)
PMV BLD AUTO: 10 FL (ref 8.9–12.7)
POTASSIUM SERPL-SCNC: 3.8 MMOL/L (ref 3.5–5.3)
PROT SERPL-MCNC: 6.7 G/DL (ref 6.4–8.4)
PROT UR STRIP-MCNC: NEGATIVE MG/DL
PROT UR-MCNC: <4 MG/DL
RBC # BLD AUTO: 4.06 MILLION/UL (ref 3.81–5.12)
SODIUM SERPL-SCNC: 132 MMOL/L (ref 135–147)
SP GR UR STRIP.AUTO: 1.01 (ref 1–1.03)
UROBILINOGEN UR STRIP-ACNC: <2 MG/DL
WBC # BLD AUTO: 12.5 THOUSAND/UL (ref 4.31–10.16)

## 2025-04-01 PROCEDURE — 82570 ASSAY OF URINE CREATININE: CPT

## 2025-04-01 PROCEDURE — 84156 ASSAY OF PROTEIN URINE: CPT

## 2025-04-01 PROCEDURE — 96374 THER/PROPH/DIAG INJ IV PUSH: CPT

## 2025-04-01 PROCEDURE — 85025 COMPLETE CBC W/AUTO DIFF WBC: CPT

## 2025-04-01 PROCEDURE — 99214 OFFICE O/P EST MOD 30 MIN: CPT | Performed by: OBSTETRICS & GYNECOLOGY

## 2025-04-01 PROCEDURE — 80053 COMPREHEN METABOLIC PANEL: CPT

## 2025-04-01 PROCEDURE — 99283 EMERGENCY DEPT VISIT LOW MDM: CPT

## 2025-04-01 PROCEDURE — 99285 EMERGENCY DEPT VISIT HI MDM: CPT | Performed by: EMERGENCY MEDICINE

## 2025-04-01 PROCEDURE — 99213 OFFICE O/P EST LOW 20 MIN: CPT

## 2025-04-01 PROCEDURE — 81003 URINALYSIS AUTO W/O SCOPE: CPT

## 2025-04-01 RX ORDER — DIPHENHYDRAMINE HCL 25 MG
25 TABLET ORAL EVERY 6 HOURS PRN
Status: DISCONTINUED | OUTPATIENT
Start: 2025-04-01 | End: 2025-04-01 | Stop reason: HOSPADM

## 2025-04-01 RX ORDER — MAGNESIUM OXIDE 400 MG/1
400 TABLET ORAL DAILY
Qty: 90 TABLET | Refills: 1 | Status: SHIPPED | OUTPATIENT
Start: 2025-04-01

## 2025-04-01 RX ORDER — METOCLOPRAMIDE HYDROCHLORIDE 5 MG/ML
10 INJECTION INTRAMUSCULAR; INTRAVENOUS ONCE
Status: DISCONTINUED | OUTPATIENT
Start: 2025-04-01 | End: 2025-04-01

## 2025-04-01 RX ORDER — ACETAMINOPHEN 10 MG/ML
1000 INJECTION, SOLUTION INTRAVENOUS ONCE
Status: DISCONTINUED | OUTPATIENT
Start: 2025-04-01 | End: 2025-04-01

## 2025-04-01 RX ORDER — ASPIRIN 81 MG/1
81 TABLET, CHEWABLE ORAL DAILY
Qty: 180 TABLET | Refills: 0 | Status: SHIPPED | OUTPATIENT
Start: 2025-04-01

## 2025-04-01 RX ORDER — METOCLOPRAMIDE HYDROCHLORIDE 5 MG/ML
10 INJECTION INTRAMUSCULAR; INTRAVENOUS EVERY 6 HOURS PRN
Status: DISCONTINUED | OUTPATIENT
Start: 2025-04-01 | End: 2025-04-01 | Stop reason: HOSPADM

## 2025-04-01 RX ORDER — ACETAMINOPHEN 325 MG/1
975 TABLET ORAL EVERY 6 HOURS PRN
Status: DISCONTINUED | OUTPATIENT
Start: 2025-04-01 | End: 2025-04-01 | Stop reason: HOSPADM

## 2025-04-01 RX ADMIN — ACETAMINOPHEN 975 MG: 325 TABLET, FILM COATED ORAL at 20:25

## 2025-04-01 RX ADMIN — METOCLOPRAMIDE 10 MG: 5 INJECTION, SOLUTION INTRAMUSCULAR; INTRAVENOUS at 20:25

## 2025-04-01 RX ADMIN — DIPHENHYDRAMINE HYDROCHLORIDE 25 MG: 25 TABLET ORAL at 20:26

## 2025-04-01 NOTE — ASSESSMENT & PLAN NOTE
Has been present since 15 wks with no vision change, RUQ pain, CP, SOB. Has noticed b/l edema for the past 2 weeks, b/l hand swelling starting today, and has not taken anything for the headache. Was recommended to take Mg Oxide for prevention and trial Tylenol for headache.    Reviewed medications for symptomatic management.  Discussed adequate hydration and return precautions.

## 2025-04-01 NOTE — PROGRESS NOTES
L&D Triage Note - OB/GYN  Reddy Garcia 39 y.o. female MRN: [id number]  Unit/Bed#: LD TRIAGE  Encounter: [id number]    Patient is seen by ANGELA.    ASSESSMENT  Reddy Garcia is a 39 y.o.  at 28w1d who presented for headaches. She is stable with workup not suggestive of pre-eclampsia at this time.    * Gestational hypertension  Assessment & Plan  BP 1: 153/66 on 3/25 @0948  BP 2: 141/74 on  @ 1710    Pre-clampsia labs ordered: CBC, CMP, UPC. All resulted WNL.  Pressures have remained WNL since arriving in triage  Recommend baby ASA daily till 36 weeks.    Episodic tension-type headache  Assessment & Plan  Has been present since 15 wks with no vision change, RUQ pain, CP, SOB. Has noticed b/l edema for the past 2 weeks, b/l hand swelling starting today, and has not taken anything for the headache. Was recommended to take Mg Oxide for prevention and trial Tylenol for headache.    Reviewed medications for symptomatic management.  Discussed adequate hydration and return precautions.    28 weeks gestation of pregnancy  Assessment & Plan  Discussed Rhogam and will receive it at next appointment 25.        #Discharge instructions  - patient instructed to call/return if experiencing worsening contractions, vaginal bleeding, loss of fluid, or decreased fetal movement  - next OB appointment: 25    Patient discussed with attending physician Dr. Lion.  ______________    SUBJECTIVE  MARIA TERESA: Estimated Date of Delivery: 25  HPI:  39 y.o.  @28w1d with history of migraines presents with complaint of headache. This pregnancy is complicated by the above medical history and AMA.     Starting around 10 AM she has been having a 7/10 frontal headache that is tension-like quality. She did not take anything for the pain and continued working through the day. After lunch she did feel nauseous and had one episode of vomiting, but appetite has returned and she continues to have the headache. Denies  "change in vision/hearing, SOB, CP, diarrhea, urinary symptoms. She has been having lower extremity swelling for the past 2 weeks with some tenderness to palpation and she has noticed mild b/l hand swelling starting today.    She denies contractions, vaginal bleeding, leakage of fluid, or decreased fetal movement.      OBJECTIVE:  Vitals:  /62   Pulse 64   Temp 98 °F (36.7 °C) (Oral)   Resp 18   Ht 5' 4\" (1.626 m)   Wt 86.2 kg (190 lb)   LMP 09/16/2024 (Exact Date)   SpO2 98%   BMI 32.61 kg/m²   Body mass index is 32.61 kg/m².    Physical Exam  Vitals reviewed.   Constitutional:       General: She is not in acute distress.     Appearance: Normal appearance.   HENT:      Nose: Nose normal.      Mouth/Throat:      Mouth: Mucous membranes are moist.      Pharynx: Oropharynx is clear.   Cardiovascular:      Rate and Rhythm: Normal rate and regular rhythm.      Pulses: Normal pulses.      Heart sounds: Normal heart sounds.   Pulmonary:      Effort: Pulmonary effort is normal.      Breath sounds: Normal breath sounds.   Abdominal:      General: Bowel sounds are normal. There is no distension.      Palpations: Abdomen is soft.      Tenderness: There is no abdominal tenderness. There is no guarding.   Musculoskeletal:         General: Swelling (b/l lower extremities) and tenderness (TTP b/l lower extremities) present. Normal range of motion.      Comments: Negative Lori's sign.   Skin:     General: Skin is warm and dry.      Capillary Refill: Capillary refill takes less than 2 seconds.   Neurological:      General: No focal deficit present.      Mental Status: She is alert and oriented to person, place, and time.   Psychiatric:         Mood and Affect: Mood normal.         Behavior: Behavior normal.         FHT:  Baseline Rate (FHR): 130 bpm  Variability: Moderate  Accelerations: 10 x 10 (<32 weeks)  Decelerations: None    TOCO:   Contraction Frequency (minutes): 0      Labs:   Recent Results (from the past 24 " hours)   Protein / creatinine ratio, urine    Collection Time: 04/01/25  7:35 PM   Result Value Ref Range    Creatinine, Ur 42.8 Reference range not established. mg/dL    Protein Urine Random <4.0 Reference range not established. mg/dL    Prot/Creat Ratio, Ur     UA w Reflex to Microscopic w Reflex to Culture    Collection Time: 04/01/25  7:36 PM    Specimen: Urine, Other   Result Value Ref Range    Color, UA Colorless     Clarity, UA Clear     Specific Gravity, UA 1.010 1.003 - 1.030    pH, UA 5.5 4.5, 5.0, 5.5, 6.0, 6.5, 7.0, 7.5, 8.0    Leukocytes, UA Negative Negative    Nitrite, UA Negative Negative    Protein, UA Negative Negative mg/dl    Glucose, UA Negative Negative mg/dl    Ketones, UA Negative Negative mg/dl    Urobilinogen, UA <2.0 <2.0 mg/dl mg/dl    Bilirubin, UA Negative Negative    Occult Blood, UA Negative Negative   CBC and differential    Collection Time: 04/01/25  7:50 PM   Result Value Ref Range    WBC 12.50 (H) 4.31 - 10.16 Thousand/uL    RBC 4.06 3.81 - 5.12 Million/uL    Hemoglobin 12.5 11.5 - 15.4 g/dL    Hematocrit 36.9 34.8 - 46.1 %    MCV 91 82 - 98 fL    MCH 30.8 26.8 - 34.3 pg    MCHC 33.9 31.4 - 37.4 g/dL    RDW 12.6 11.6 - 15.1 %    MPV 10.0 8.9 - 12.7 fL    Platelets 263 149 - 390 Thousands/uL    nRBC 0 /100 WBCs    Segmented % 77 (H) 43 - 75 %    Immature Grans % 1 0 - 2 %    Lymphocytes % 16 14 - 44 %    Monocytes % 5 4 - 12 %    Eosinophils Relative 1 0 - 6 %    Basophils Relative 0 0 - 1 %    Absolute Neutrophils 9.55 (H) 1.85 - 7.62 Thousands/µL    Absolute Immature Grans 0.08 0.00 - 0.20 Thousand/uL    Absolute Lymphocytes 2.05 0.60 - 4.47 Thousands/µL    Absolute Monocytes 0.60 0.17 - 1.22 Thousand/µL    Eosinophils Absolute 0.18 0.00 - 0.61 Thousand/µL    Basophils Absolute 0.04 0.00 - 0.10 Thousands/µL   Comprehensive metabolic panel    Collection Time: 04/01/25  7:50 PM   Result Value Ref Range    Sodium 132 (L) 135 - 147 mmol/L    Potassium 3.8 3.5 - 5.3 mmol/L    Chloride  103 96 - 108 mmol/L    CO2 21 21 - 32 mmol/L    ANION GAP 8 4 - 13 mmol/L    BUN 12 5 - 25 mg/dL    Creatinine 0.49 (L) 0.60 - 1.30 mg/dL    Glucose 81 65 - 140 mg/dL    Calcium 8.6 8.4 - 10.2 mg/dL    Corrected Calcium 9.1 8.3 - 10.1 mg/dL    AST 15 13 - 39 U/L    ALT 14 7 - 52 U/L    Alkaline Phosphatase 62 34 - 104 U/L    Total Protein 6.7 6.4 - 8.4 g/dL    Albumin 3.4 (L) 3.5 - 5.0 g/dL    Total Bilirubin 0.25 0.20 - 1.00 mg/dL    eGFR 123 ml/min/1.73sq tarik Villa DO  Family Medicine PGY-1  04/01/25  9:30 PM

## 2025-04-01 NOTE — ED ATTENDING ATTESTATION
4/1/2025  I, Chu Soliz MD, saw and evaluated the patient. I have discussed the patient with the resident/non-physician practitioner and agree with the resident's/non-physician practitioner's findings, Plan of Care, and MDM as documented in the resident's/non-physician practitioner's note, except where noted. All available labs and Radiology studies were reviewed.  I was present for key portions of any procedure(s) performed by the resident/non-physician practitioner and I was immediately available to provide assistance.       At this point I agree with the current assessment done in the Emergency Department.  I have conducted an independent evaluation of this patient a history and physical is as follows:    History    Patient is a 39-year-old female, with a history significant for current pregnancy at 28 weeks gestation and migraines per my review the medical record, who presents to the ED today for evaluation of atraumatic, gradual onset, constant, pressure in character, bilateral frontal, progressively worsening headache since 10 AM this morning.  Patient states this headache is similar in character to prior headaches.  There is no positional component to the pain and it does not matter if she is lying down or time of day.  Patient has not taken anything today to try to remit her symptoms but she did splash her face with cold water.  No clear exacerbating factors.  Patient also reports a 2-day history of bilateral hand swelling/tingling as well as a 2-week history of lower extremity edema, both of which are new to her.  Patient also reports that she has been taking her blood pressure last couple days and she has had multiple readings greater than 140 systolic.  Patient has also had nausea and 1 episode of vomiting.  Patient denies vaginal bleeding/vaginal discharge/abdominal pain/chest pain/vision change/dyspnea.    Hospital translation services offered but patient declined expressing preference  to conduct encounter in English.    Patient is without other concerns at this time.     ROS  Patient denies: Fever; dysphagia; vision change; chest pain; dyspnea; abdominal pain; polyuria; dysuria; rash; weakness; numbness; difficulty walking; confusion    Physical Exam    GENERAL APPEARANCE: NAD  NEURO: Cranial nerves 2-12 intact.  5/5 strength in all four extremities including finger extension against resistance.  Sensation to light touch subjectively intact/equal in all four extremities and the face.  Patient able to ambulate without difficulty.  Patient is speaking clearly in complete sentences.  Patient is answering appropriately and able to follow commands.   HEENT: PERRL, Moist mucous membranes, external ears normal, nose normal  Neck: No cervical adenopathy  CV: RRR. No murmurs, rubs, gallops  LUNGS: Clear to auscultation: No wheezes, stridor, rhonchi, rales  GI: Abdomen non-distended. Soft. Non-tender and without rebound or guarding   : Deferred at this time  MSK: No deformity.  Bilateral lower extremity pitting edema  Skin: Warm and dry  Capillary refill: <2 seconds        Patient is currently afebrile and hemodynamically stable    Assessment/Plan/MDM  Headache, vomiting, leg swelling  -This presentation is concerning for: Preeclampsia, primary headache, electrolyte abnormality.  Doubt subarachnoid, meningitis, carotid dissection, dural venous sinus thrombosis based upon history/physical exam  -Will investigate with CBC, CMP  -Will discuss with OB/GYN  -Will manage with multimodal multimodal analgesia, antiemetics, fluids, further based upon workup/recommendations    ED Course  ED Course as of 04/01/25 1810   Tue Apr 01, 2025   1806 Per discussion with Dr. Diaz from OB/GYN, patient should be sent to L&D         Critical Care Time  Procedures

## 2025-04-01 NOTE — ED PROVIDER NOTES
Time reflects when diagnosis was documented in both MDM as applicable and the Disposition within this note       Time User Action Codes Description Comment    2025  5:57 PM Fab Myers Add [R51.9] Headache     2025  5:57 PM Fab Myers Add [M79.89] Leg swelling     2025  5:58 PM Fab Myers Add [R03.0] Elevated blood pressure reading     2025  6:07 PM LegRell chamberser INOCENTE Add [R51.9] Acute headache     2025  6:07 PM Legare, Reller A Modify [R51.9] Headache     2025  6:07 PM Legreyes, Reller A Modify [R51.9] Acute headache           ED Disposition       ED Disposition   Send to L&D After Provider Eval    Condition   --    Date/Time     5:57 PM    Comment   --             Assessment & Plan       Medical Decision Making  39-year-old female, currently 28 weeks pregnant, , who presents emergency department for evaluation of intermittent headaches ongoing for the last 3 weeks with a headache beginning at 10 AM this morning.  Patient reporting bilateral leg swelling for the last 2 weeks.  Elevated blood pressures at home, and upon arrival to the ED.    Differential diagnoses include but not limited to: Preeclampsia, acute primary headache, IIH, central venous thrombosis, electrolyte derangement.    Orders written for labs, urinalysis.     Orders written for IV fluids IV Reglan, IV Tylenol.    Case discussed with Dr. Michelle Diaz, OBGYN Resident, who agrees with the management, request patient be transferred to L&D for evaluation.   Patient agreeable to the plans for L&D evaluation.   Patient transferred to L&D prior to any medication administration.       Amount and/or Complexity of Data Reviewed  Labs: ordered.    Risk  Prescription drug management.             Medications   sodium chloride 0.9 % bolus 1,000 mL (has no administration in time range)   metoclopramide (REGLAN) injection 10 mg (has no administration in time range)   acetaminophen (Ofirmev)  injection 1,000 mg (has no administration in time range)       ED Risk Strat Scores                            SBIRT 22yo+      Flowsheet Row Most Recent Value   Initial Alcohol Screen: US AUDIT-C     1. How often do you have a drink containing alcohol? 0 Filed at: 2025   2. How many drinks containing alcohol do you have on a typical day you are drinking?  0 Filed at: 2025   3a. Male UNDER 65: How often do you have five or more drinks on one occasion? 0 Filed at: 2025   3b. FEMALE Any Age, or MALE 65+: How often do you have 4 or more drinks on one occassion? 0 Filed at: 2025   Audit-C Score 0 Filed at: 2025   STEPHANIE: How many times in the past year have you...    Used an illegal drug or used a prescription medication for non-medical reasons? Never Filed at: 2025                            History of Present Illness       Chief Complaint   Patient presents with    Headache     28w pregnant. Headache since awakening this morning. +nausea, tingling hands. No abdominal pain. Similar episode last week       Past Medical History:   Diagnosis Date    Migraine     migraines since teenager      Past Surgical History:   Procedure Laterality Date    INGUINAL HERNIA REPAIR        Family History   Problem Relation Age of Onset    Hypertension Mother     No Known Problems Father     No Known Problems Sister       Social History     Tobacco Use    Smoking status: Never    Smokeless tobacco: Never   Vaping Use    Vaping status: Never Used   Substance Use Topics    Alcohol use: Not Currently    Drug use: Never      E-Cigarette/Vaping    E-Cigarette Use Never User       E-Cigarette/Vaping Substances    Nicotine No     THC No     CBD No     Flavoring No     Other No     Unknown No       I have reviewed and agree with the history as documented.       Headache    Patient is a 39-year-old female, , currently 28 weeks gestation, history of migraines, who presents  emergency department for evaluation of headache, beginning at 10 AM this morning.  Patient reports headache is located to  bilateral temples, non-radiating, similar to previous headaches.  Patient states she has had an intermittent headache similar for the last 3 weeks.  States she was evaluated in this hospital by L&D last week for the same, felt improved after Reglan Tylenol IV fluids and was ultimately discharged.  States over the past few days has been monitoring her blood pressure, noted to be around 140 systolic at home.  Has noticed some increased nausea and 1 episode of vomiting today, and bilateral lower extremity swelling for the last 2 weeks.  Patient denies any visual changes, dizziness, fevers, chills, chest pain, difficulty breathing, abdominal pain.     Review of Systems   Neurological:  Positive for headaches.   All other systems reviewed and are negative.          Objective       ED Triage Vitals [04/01/25 1710]   Temperature Pulse Blood Pressure Respirations SpO2 Patient Position - Orthostatic VS   98.1 °F (36.7 °C) 80 141/74 18 98 % Sitting      Temp Source Heart Rate Source BP Location FiO2 (%) Pain Score    Oral Monitor Right arm -- 7      Vitals      Date and Time Temp Pulse SpO2 Resp BP Pain Score FACES Pain Rating User   04/01/25 1727 -- -- -- -- 137/85 -- -- RL   04/01/25 1710 98.1 °F (36.7 °C) 80 98 % 18 141/74 7 -- EM            Physical Exam  Vitals (Initially hypertensive 141 systolic. Improved to 137/85) reviewed.   Constitutional:       General: She is not in acute distress.     Appearance: Normal appearance. She is not ill-appearing, toxic-appearing or diaphoretic.      Comments:  performed by me using doppler    HENT:      Head: Normocephalic and atraumatic.      Right Ear: External ear normal.      Left Ear: External ear normal.      Mouth/Throat:      Mouth: Mucous membranes are moist.   Cardiovascular:      Rate and Rhythm: Normal rate and regular rhythm.      Heart sounds:  No murmur heard.     No friction rub. No gallop.   Pulmonary:      Breath sounds: Normal breath sounds. No wheezing, rhonchi or rales.   Abdominal:      Palpations: Abdomen is soft.      Tenderness: There is no abdominal tenderness.      Comments: Gravid abdomen   Musculoskeletal:      Right lower leg: Edema present.      Left lower leg: Edema present.      Comments: 2+ bilateral lower extremity edema   Skin:     General: Skin is warm.      Capillary Refill: Capillary refill takes less than 2 seconds.   Neurological:      General: No focal deficit present.      Mental Status: She is alert and oriented to person, place, and time.      Cranial Nerves: No cranial nerve deficit.      Sensory: No sensory deficit.      Motor: No weakness.      Coordination: Coordination normal.      Gait: Gait normal.   Psychiatric:         Mood and Affect: Mood normal.         Behavior: Behavior normal.         Thought Content: Thought content normal.         Judgment: Judgment normal.         Results Reviewed       Procedure Component Value Units Date/Time    CBC and differential [[id number]]     Lab Status: No result Specimen: Blood     Comprehensive metabolic panel [[id number]]     Lab Status: No result Specimen: Blood     UA w Reflex to Microscopic w Reflex to Culture [[id number]]     Lab Status: No result Specimen: Urine             No orders to display       Procedures    ED Medication and Procedure Management   Prior to Admission Medications   Prescriptions Last Dose Informant Patient Reported? Taking?   Shweta Low Dose 81 MG chewable tablet  Self No No   Sig: CHEW 2 TABLETS (162 MG TOTAL) DAILY   Patient not taking: No sig reported   Prenatal Vit-Fe Fumarate-FA (PRENATAL PO)  Self Yes No   Sig: Take by mouth   calcium carbonate (TUMS) 500 mg chewable tablet  Self No No   Sig: Chew 2 tablets (1,000 mg total) 3 (three) times a day   cyclobenzaprine (FLEXERIL) 10 mg tablet   No No   Sig: Take 1 tablet (10 mg total) by mouth 3 (three)  times a day as needed for muscle spasms      Facility-Administered Medications: None     Current Discharge Medication List        CONTINUE these medications which have NOT CHANGED    Details   Shweta Low Dose 81 MG chewable tablet CHEW 2 TABLETS (162 MG TOTAL) DAILY  Qty: 180 tablet, Refills: 4    Associated Diagnoses: 7 weeks gestation of pregnancy      calcium carbonate (TUMS) 500 mg chewable tablet Chew 2 tablets (1,000 mg total) 3 (three) times a day  Qty: 60 tablet, Refills: 6    Associated Diagnoses: Gastroesophageal reflux disease without esophagitis      cyclobenzaprine (FLEXERIL) 10 mg tablet Take 1 tablet (10 mg total) by mouth 3 (three) times a day as needed for muscle spasms  Qty: 20 tablet, Refills: 0    Associated Diagnoses: Back pain in pregnancy      Prenatal Vit-Fe Fumarate-FA (PRENATAL PO) Take by mouth           No discharge procedures on file.  ED SEPSIS DOCUMENTATION   Time reflects when diagnosis was documented in both MDM as applicable and the Disposition within this note       Time User Action Codes Description Comment    4/1/2025  5:57 PM Fab Myers [R51.9] Headache     4/1/2025  5:57 PM Fab Myers Add [M79.89] Leg swelling     4/1/2025  5:58 PM Fab Myers [R03.0] Elevated blood pressure reading     4/1/2025  6:07 PM Chu Soliz Add [R51.9] Acute headache     4/1/2025  6:07 PM Chu Soilz Modify [R51.9] Headache     4/1/2025  6:07 PM Chu Soliz Modify [R51.9] Acute headache                  Fab CAMARA DO  04/01/25 1814

## 2025-04-01 NOTE — ASSESSMENT & PLAN NOTE
BP 1: 153/66 on 3/25 @0948  BP 2: 141/74 on 4/1 @ 1710    Pre-clampsia labs ordered: CBC, CMP, UPC. All resulted WNL.  Pressures have remained WNL since arriving in triage  Recommend baby ASA daily till 36 weeks.

## 2025-04-01 NOTE — LETTER
[facility]  [address] [facility] [address]  Dept: [phone]        April 1, 2025         Patient: Reddy Garcia   YOB: 1985   Date of Visit: 4/1/2025         To Whom it May Concern:    Reddy Garcia is under my professional care. She was seen in the hospital from 4/1/2025 to 04/01/25. She  may return to work on 4/3/2025 .    If you have any questions or concerns, please don't hesitate to call.         Sincerely,          Anibal Villa, DO

## 2025-04-02 NOTE — DISCHARGE INSTR - AVS FIRST PAGE
- Please start taking Magnesium Oxide 400 mg daily to help with migraine prevention.    - Please continue taking the baby Aspirin 81 mg daily until 36 weeks.

## 2025-04-04 ENCOUNTER — ROUTINE PRENATAL (OUTPATIENT)
Dept: OBGYN CLINIC | Facility: CLINIC | Age: 40
End: 2025-04-04

## 2025-04-04 VITALS
BODY MASS INDEX: 33.8 KG/M2 | HEIGHT: 64 IN | SYSTOLIC BLOOD PRESSURE: 114 MMHG | DIASTOLIC BLOOD PRESSURE: 70 MMHG | HEART RATE: 72 BPM | OXYGEN SATURATION: 99 % | WEIGHT: 198 LBS

## 2025-04-04 DIAGNOSIS — Z3A.28 28 WEEKS GESTATION OF PREGNANCY: Primary | ICD-10-CM

## 2025-04-04 DIAGNOSIS — O09.512 PRIMIGRAVIDA OF ADVANCED MATERNAL AGE IN SECOND TRIMESTER: ICD-10-CM

## 2025-04-04 DIAGNOSIS — Z76.81 EXPECTANT PARENT PREBIRTH PEDIATRICIAN VISIT: ICD-10-CM

## 2025-04-04 DIAGNOSIS — O13.2 GESTATIONAL HYPERTENSION, SECOND TRIMESTER: ICD-10-CM

## 2025-04-04 DIAGNOSIS — O26.893 RH NEGATIVE STATUS DURING PREGNANCY IN THIRD TRIMESTER: ICD-10-CM

## 2025-04-04 DIAGNOSIS — O26.899 PREGNANCY HEADACHE, ANTEPARTUM: ICD-10-CM

## 2025-04-04 DIAGNOSIS — Z23 ENCOUNTER FOR IMMUNIZATION: ICD-10-CM

## 2025-04-04 DIAGNOSIS — R51.9 PREGNANCY HEADACHE, ANTEPARTUM: ICD-10-CM

## 2025-04-04 DIAGNOSIS — Z67.91 RH NEGATIVE STATUS DURING PREGNANCY IN THIRD TRIMESTER: ICD-10-CM

## 2025-04-04 LAB
SL AMB  POCT GLUCOSE, UA: NORMAL
SL AMB POCT URINE PROTEIN: NORMAL

## 2025-04-04 NOTE — ASSESSMENT & PLAN NOTE
BP Readings from Last 3 Encounters:   04/04/25 114/70   04/01/25 102/62   03/25/25 118/74   - Well controlled per review in Epic.  Mildly elevated by report at home.  - Patient previously taking BP at different times during day  - Information sheet given in [place] with blood pressure log. Pt advised to take BP at around same time daily in sitting position with feet on the floor.  She does not need to take BP multiple times a day.  - Advised to bring in log at next appointment  - Continue ASA  - reviewed delivery recommended at 37 weeks. She was concerned about pre-term delivery. Explained that she would be considered a term pregnancy at that time.  Briefly reviewed induction of labor. Explained that patient would be scheduled for induction later in pregnancy.  - Reviewed pre-eclampsia s/s including worsening HA despite taking Tylenol for analgesic, RUQ pain, vision changes.  - Reviewed 32 week growth Ultrasound. Appointment has been made.  - Blood work and urine ordered.    Orders:    Comprehensive metabolic panel; Future    Protein / creatinine ratio, urine; Future    CBC and differential; Future

## 2025-04-15 ENCOUNTER — TELEPHONE (OUTPATIENT)
Age: 40
End: 2025-04-15

## 2025-04-15 NOTE — TELEPHONE ENCOUNTER
Patient is calling to make sure her [facility] appt for 5/8/25 [facility] Luis F is with Dr Edwards and [employer] tech.    Patient states she will change her appt to any location just to make sure she is with Dr Edwards and [employer] tech.    Please call patient back to discuss appt.

## 2025-04-16 ENCOUNTER — TELEPHONE (OUTPATIENT)
Dept: OBGYN CLINIC | Facility: CLINIC | Age: 40
End: 2025-04-16

## 2025-04-16 ENCOUNTER — TELEPHONE (OUTPATIENT)
Age: 40
End: 2025-04-16

## 2025-04-16 ENCOUNTER — APPOINTMENT (OUTPATIENT)
Dept: LAB | Facility: CLINIC | Age: 40
End: 2025-04-16
Attending: PHYSICIAN ASSISTANT
Payer: MEDICARE

## 2025-04-16 ENCOUNTER — ROUTINE PRENATAL (OUTPATIENT)
Dept: OBGYN CLINIC | Facility: CLINIC | Age: 40
End: 2025-04-16
Payer: MEDICARE

## 2025-04-16 VITALS — BODY MASS INDEX: 34.84 KG/M2 | WEIGHT: 203 LBS | SYSTOLIC BLOOD PRESSURE: 120 MMHG | DIASTOLIC BLOOD PRESSURE: 84 MMHG

## 2025-04-16 DIAGNOSIS — Z34.03 ENCOUNTER FOR SUPERVISION OF NORMAL FIRST PREGNANCY IN THIRD TRIMESTER: Primary | ICD-10-CM

## 2025-04-16 DIAGNOSIS — Z3A.28 28 WEEKS GESTATION OF PREGNANCY: ICD-10-CM

## 2025-04-16 DIAGNOSIS — R51.9 PREGNANCY HEADACHE, ANTEPARTUM: ICD-10-CM

## 2025-04-16 DIAGNOSIS — O13.2 GESTATIONAL HYPERTENSION, SECOND TRIMESTER: ICD-10-CM

## 2025-04-16 DIAGNOSIS — O26.899 PREGNANCY HEADACHE, ANTEPARTUM: ICD-10-CM

## 2025-04-16 DIAGNOSIS — O13.3 GESTATIONAL HYPERTENSION, THIRD TRIMESTER: ICD-10-CM

## 2025-04-16 LAB
ALBUMIN SERPL BCG-MCNC: 3.3 G/DL (ref 3.5–5)
ALP SERPL-CCNC: 68 U/L (ref 34–104)
ALT SERPL W P-5'-P-CCNC: 13 U/L (ref 7–52)
ANION GAP SERPL CALCULATED.3IONS-SCNC: 9 MMOL/L (ref 4–13)
AST SERPL W P-5'-P-CCNC: 17 U/L (ref 13–39)
BASOPHILS # BLD AUTO: 0.06 THOUSANDS/ÂΜL (ref 0–0.1)
BASOPHILS NFR BLD AUTO: 1 % (ref 0–1)
BILIRUB SERPL-MCNC: 0.24 MG/DL (ref 0.2–1)
BUN SERPL-MCNC: 12 MG/DL (ref 5–25)
CALCIUM ALBUM COR SERPL-MCNC: 9.9 MG/DL (ref 8.3–10.1)
CALCIUM SERPL-MCNC: 9.3 MG/DL (ref 8.4–10.2)
CHLORIDE SERPL-SCNC: 103 MMOL/L (ref 96–108)
CO2 SERPL-SCNC: 24 MMOL/L (ref 21–32)
CREAT SERPL-MCNC: 0.5 MG/DL (ref 0.6–1.3)
CREAT UR-MCNC: 112.4 MG/DL
EOSINOPHIL # BLD AUTO: 0.16 THOUSAND/ÂΜL (ref 0–0.61)
EOSINOPHIL NFR BLD AUTO: 1 % (ref 0–6)
ERYTHROCYTE [DISTWIDTH] IN BLOOD BY AUTOMATED COUNT: 12.6 % (ref 11.6–15.1)
GFR SERPL CREATININE-BSD FRML MDRD: 123 ML/MIN/1.73SQ M
GLUCOSE SERPL-MCNC: 73 MG/DL (ref 65–140)
HCT VFR BLD AUTO: 38.1 % (ref 34.8–46.1)
HGB BLD-MCNC: 12.4 G/DL (ref 11.5–15.4)
IMM GRANULOCYTES # BLD AUTO: 0.08 THOUSAND/UL (ref 0–0.2)
IMM GRANULOCYTES NFR BLD AUTO: 1 % (ref 0–2)
LYMPHOCYTES # BLD AUTO: 1.89 THOUSANDS/ÂΜL (ref 0.6–4.47)
LYMPHOCYTES NFR BLD AUTO: 16 % (ref 14–44)
MCH RBC QN AUTO: 30 PG (ref 26.8–34.3)
MCHC RBC AUTO-ENTMCNC: 32.5 G/DL (ref 31.4–37.4)
MCV RBC AUTO: 92 FL (ref 82–98)
MONOCYTES # BLD AUTO: 0.7 THOUSAND/ÂΜL (ref 0.17–1.22)
MONOCYTES NFR BLD AUTO: 6 % (ref 4–12)
NEUTROPHILS # BLD AUTO: 8.73 THOUSANDS/ÂΜL (ref 1.85–7.62)
NEUTS SEG NFR BLD AUTO: 75 % (ref 43–75)
NRBC BLD AUTO-RTO: 0 /100 WBCS
PLATELET # BLD AUTO: 296 THOUSANDS/UL (ref 149–390)
PMV BLD AUTO: 10.6 FL (ref 8.9–12.7)
POTASSIUM SERPL-SCNC: 4.1 MMOL/L (ref 3.5–5.3)
PROT SERPL-MCNC: 6.5 G/DL (ref 6.4–8.4)
PROT UR-MCNC: 14.3 MG/DL
PROT/CREAT UR: 0.1 MG/G{CREAT}
RBC # BLD AUTO: 4.14 MILLION/UL (ref 3.81–5.12)
SODIUM SERPL-SCNC: 136 MMOL/L (ref 135–147)
WBC # BLD AUTO: 11.62 THOUSAND/UL (ref 4.31–10.16)

## 2025-04-16 PROCEDURE — 82570 ASSAY OF URINE CREATININE: CPT

## 2025-04-16 PROCEDURE — 84156 ASSAY OF PROTEIN URINE: CPT

## 2025-04-16 PROCEDURE — 36415 COLL VENOUS BLD VENIPUNCTURE: CPT

## 2025-04-16 PROCEDURE — 80053 COMPREHEN METABOLIC PANEL: CPT

## 2025-04-16 PROCEDURE — 99213 OFFICE O/P EST LOW 20 MIN: CPT | Performed by: STUDENT IN AN ORGANIZED HEALTH CARE EDUCATION/TRAINING PROGRAM

## 2025-04-16 PROCEDURE — 85025 COMPLETE CBC W/AUTO DIFF WBC: CPT

## 2025-04-16 NOTE — TELEPHONE ENCOUNTER
----- Message from Nevin GUTIERREZ sent at 4/16/2025  3:28 PM EDT -----  Regarding: FW: Scheduling    ----- Message -----  From: Emily Taylor MD  Sent: 4/16/2025   3:27 PM EDT  To: [facility]  Subject: Scheduling                                       Procedure to be scheduled: mIOL  MARIA TERESA: Estimated Date of Delivery: Estimated Date of Delivery: 6/23/25  Indication for delivery: gHTN  Requested date (s) of delivery: 6/2/25         If requested date is unavailable, is there a date by which the pt must be delivered?  Physician preference:      If IOL, anticipated method: ripening

## 2025-04-16 NOTE — ASSESSMENT & PLAN NOTE
Reviewed pathophysiology of gestational hypertension today  - reviewed need for 37wk IOL and scheduling initiated today. Inadvertently forgot IOL consent, will need this  - reviewed IOL process, that this does not mean certain , but that the process can be several days  - PEC precautions reviewed  - bloodwork completed today, normal

## 2025-04-16 NOTE — PROGRESS NOTES
39 y.o.  at 30w2d, here for routine OB visit. Feeling well overall and without concerns. Good FM. Denies LOF, VB, contractions. Denies HA, ruq pain. Notes that Bps are home are somewhat variable, from one day to the next. Would like to discuss gHTN further today.     Problem List Items Addressed This Visit          Cardiovascular and Mediastinum    Gestational hypertension    Reviewed pathophysiology of gestational hypertension today  - reviewed need for 37wk IOL and scheduling initiated today. Inadvertently forgot IOL consent, will need this  - reviewed IOL process, that this does not mean certain , but that the process can be several days  - PEC precautions reviewed  - bloodwork completed today, normal            Obstetrics/Gynecology    28 weeks gestation of pregnancy    -precautions reviewed  -s/p Tdap/flu/rhogam  -prepregnancy BMI 23 with goal weight gain 25-35#: TWG = 64#          Other Visit Diagnoses         Encounter for supervision of normal first pregnancy in third trimester    -  Primary

## 2025-04-16 NOTE — ASSESSMENT & PLAN NOTE
-precautions reviewed  -s/p Tdap/flu/rhogam  -prepregnancy BMI 23 with goal weight gain 25-35#: TWG = 64#

## 2025-04-16 NOTE — TELEPHONE ENCOUNTER
Left patient a message that her 5/8 M appointment had to be rescheduled to 5/1 @ 12:45 PM, [place] office.  Reddy prefers Dr. Edwards and Estefany [facility]. The new time, date and location were provided.  The patient has been instructed to please call us back at [phone] with any questions or concerns.

## 2025-04-16 NOTE — PROGRESS NOTES
Pt is here for routine ob visit   No concerns at this time  Unable to void   No LOF,VB,Contractions  +FM   UTD flu/tdap/rhogam   Delivery consent signed at previous visit

## 2025-04-17 NOTE — TELEPHONE ENCOUNTER
6/2 8pm EC into 6/3 AG  LEFT MESSAGE    Patient notified of IOL date,time,and location. Advised patient she may eat a light breakfast/dinner prior to going to L&D. In the interim please report any vaginal bleeding,leakage of fluid,decreased fetal movement or contractions.Reviewed fetal kick counts. Advised to keep all upcoming prenatal visits.

## 2025-04-21 ENCOUNTER — RESULTS FOLLOW-UP (OUTPATIENT)
Dept: OBGYN CLINIC | Facility: CLINIC | Age: 40
End: 2025-04-21

## 2025-04-23 ENCOUNTER — TELEPHONE (OUTPATIENT)
Dept: OBGYN CLINIC | Facility: CLINIC | Age: 40
End: 2025-04-23

## 2025-04-23 NOTE — TELEPHONE ENCOUNTER
Patient was called and discussed the following:    Overall how are you feeling? Feeling good. Checking her BP most current 136/82. Patient reminded to call for any high Bp's or symptoms. Patient verbalized understanding    Compliant with routine OB appointments? Yes    Have you completed your 3rd trimester lab work? Yes Completed    Have you reviewed the contents of 3rd trimester folder from office? Yes   Have you decided on a pediatrician? No not yet had a scheduled Pediatrician appointment but canceled. Still unsure of which office.        Questions on paperwork to go back to office? No   Questions on the baby birth certificate and photography forms? NO    Patient is aware of 4/29 OB appointment.    Edna Mirza RN  OB Nurse Navigator

## 2025-04-24 NOTE — TELEPHONE ENCOUNTER
----- Message from Shahana Roque PA-C sent at 4/21/2025  9:38 AM EDT -----  Please review with patient. She does speak English, but speaks [place]i better.    I believe these were reviewed with you at your appointment. You blood work is all normal.    Take Care  Christiano Roque PAC

## 2025-04-29 ENCOUNTER — ROUTINE PRENATAL (OUTPATIENT)
Dept: OBGYN CLINIC | Facility: CLINIC | Age: 40
End: 2025-04-29
Payer: MEDICARE

## 2025-04-29 DIAGNOSIS — O09.512 PRIMIGRAVIDA OF ADVANCED MATERNAL AGE IN SECOND TRIMESTER: ICD-10-CM

## 2025-04-29 DIAGNOSIS — Z34.03 ENCOUNTER FOR SUPERVISION OF NORMAL FIRST PREGNANCY IN THIRD TRIMESTER: ICD-10-CM

## 2025-04-29 DIAGNOSIS — O13.3 GESTATIONAL HYPERTENSION, THIRD TRIMESTER: Primary | ICD-10-CM

## 2025-04-29 DIAGNOSIS — Z3A.32 32 WEEKS GESTATION OF PREGNANCY: ICD-10-CM

## 2025-04-29 PROBLEM — R51.9 HEADACHE: Status: RESOLVED | Noted: 2025-03-25 | Resolved: 2025-04-29

## 2025-04-29 LAB
SL AMB  POCT GLUCOSE, UA: NEGATIVE
SL AMB POCT URINE PROTEIN: NEGATIVE

## 2025-04-29 PROCEDURE — 99214 OFFICE O/P EST MOD 30 MIN: CPT | Performed by: OBSTETRICS & GYNECOLOGY

## 2025-04-29 PROCEDURE — 81002 URINALYSIS NONAUTO W/O SCOPE: CPT | Performed by: OBSTETRICS & GYNECOLOGY

## 2025-04-29 NOTE — ASSESSMENT & PLAN NOTE
Patient with diagnosis of gestational HTN.    Denies HA, vision changes, RUQ/epigastric pain, facial edema.  Has had b/l LE edema and intermittent edema of hands since ~27 weeks.   Will increase visits to weekly for BP checks.    Has growth US tomorrow - will message MFM to discuss if antepartum fetal surveillance is indicated.  Continue ASA 162mg daily.   Pre-eclampsia precautions reviewed.   IOL scheduled 6/2 into 6/3 - reviewed likely methods to be used, logic behind overnight ripening and the need to perform a cervical exam closer to the induction date to confirm method.  Will need IOL consent at visit prior to induction.

## 2025-04-29 NOTE — PROGRESS NOTES
PNV @ 32w1d. (Visit conducted in [place]).  Denies ctxs, VB, LOF.  Good FM.  Feels swollen but otherwise well.  Checking BPs at home - aware of what values warrant phone call to office or on call doctor.  BP cuff verified to be reading accurately in office today.     Problem List Items Addressed This Visit       32 weeks gestation of pregnancy    EPDS = 0   precautions reviewed.          Primigravida of advanced maternal age in second trimester    [id number] and MSAFP low risk.   Level 2 US normal.          Gestational hypertension    Patient with diagnosis of gestational HTN.    Denies HA, vision changes, RUQ/epigastric pain, facial edema.  Has had b/l LE edema and intermittent edema of hands since ~27 weeks.   Will increase visits to weekly for BP checks.    Has growth US tomorrow - will message MFM to discuss if antepartum fetal surveillance is indicated.  Continue ASA 162mg daily.   Pre-eclampsia precautions reviewed.   IOL scheduled  into 6/3 - reviewed likely methods to be used, logic behind overnight ripening and the need to perform a cervical exam closer to the induction date to confirm method.  Will need IOL consent at visit prior to induction.           Other Visit Diagnoses         Encounter for supervision of normal first pregnancy in third trimester    -  Primary    Relevant Orders    POCT urine dip

## 2025-05-01 ENCOUNTER — ULTRASOUND (OUTPATIENT)
Dept: PERINATAL CARE | Facility: OTHER | Age: 40
End: 2025-05-01
Payer: MEDICARE

## 2025-05-01 VITALS
SYSTOLIC BLOOD PRESSURE: 120 MMHG | DIASTOLIC BLOOD PRESSURE: 80 MMHG | HEART RATE: 86 BPM | BODY MASS INDEX: 35.51 KG/M2 | HEIGHT: 64 IN | WEIGHT: 208 LBS

## 2025-05-01 DIAGNOSIS — Z3A.32 32 WEEKS GESTATION OF PREGNANCY: Primary | ICD-10-CM

## 2025-05-01 DIAGNOSIS — O13.3 GESTATIONAL HYPERTENSION, THIRD TRIMESTER: ICD-10-CM

## 2025-05-01 PROCEDURE — 99213 OFFICE O/P EST LOW 20 MIN: CPT | Performed by: OBSTETRICS & GYNECOLOGY

## 2025-05-01 PROCEDURE — 59025 FETAL NON-STRESS TEST: CPT | Performed by: OBSTETRICS & GYNECOLOGY

## 2025-05-01 PROCEDURE — 76816 OB US FOLLOW-UP PER FETUS: CPT | Performed by: OBSTETRICS & GYNECOLOGY

## 2025-05-01 NOTE — LETTER
NST sleeve cover sheet    Patient name: Reddy Garcia    : 1985  MRN: [id number]    MARIA TERESA: Estimated Date of Delivery: 25    Obstetrician: _______________________________    Reason(s) for testing:  __________________________________________      Testing frequency:    ___ 2x/wk  ___ 1x/wk  ___ Dopplers  ___ BPP?      Last growth scan: __________________________________________

## 2025-05-01 NOTE — PROGRESS NOTES
Repeat Non-Stress Testing:    Patient verbalizes +FM. Pt denies ALL:               Leaking of fluid   Contractions   Vaginal bleeding   Decreased fetal movement    Patient is performing daily kick counts. Patient has no questions or concerns.   NST strip reviewed by Dr. Edwards in-person.  .

## 2025-05-06 ENCOUNTER — ROUTINE PRENATAL (OUTPATIENT)
Dept: OBGYN CLINIC | Facility: CLINIC | Age: 40
End: 2025-05-06
Payer: MEDICARE

## 2025-05-06 VITALS — SYSTOLIC BLOOD PRESSURE: 136 MMHG | BODY MASS INDEX: 35.87 KG/M2 | WEIGHT: 209 LBS | DIASTOLIC BLOOD PRESSURE: 80 MMHG

## 2025-05-06 DIAGNOSIS — Z34.03 ENCOUNTER FOR SUPERVISION OF NORMAL FIRST PREGNANCY IN THIRD TRIMESTER: Primary | ICD-10-CM

## 2025-05-06 DIAGNOSIS — Z3A.33 33 WEEKS GESTATION OF PREGNANCY: ICD-10-CM

## 2025-05-06 DIAGNOSIS — O13.3 GESTATIONAL HYPERTENSION, THIRD TRIMESTER: ICD-10-CM

## 2025-05-06 LAB
DME PARACHUTE DELIVERY DATE REQUESTED: NORMAL
DME PARACHUTE ITEM DESCRIPTION: NORMAL
DME PARACHUTE ORDER STATUS: NORMAL
DME PARACHUTE SUPPLIER NAME: NORMAL
DME PARACHUTE SUPPLIER PHONE: NORMAL
SL AMB  POCT GLUCOSE, UA: NORMAL
SL AMB POCT URINE PROTEIN: NORMAL

## 2025-05-06 PROCEDURE — 99214 OFFICE O/P EST MOD 30 MIN: CPT | Performed by: STUDENT IN AN ORGANIZED HEALTH CARE EDUCATION/TRAINING PROGRAM

## 2025-05-06 PROCEDURE — 81002 URINALYSIS NONAUTO W/O SCOPE: CPT | Performed by: STUDENT IN AN ORGANIZED HEALTH CARE EDUCATION/TRAINING PROGRAM

## 2025-05-06 NOTE — ASSESSMENT & PLAN NOTE
-BP today wnl, asymptomatic   -baseline HELLP labs wnl  -continue ASA   -surveillance scheduled   -preeclampsia precautions provided   -IOL scheduled for 6/2/25

## 2025-05-06 NOTE — PROGRESS NOTES
33 weeks gestation of pregnancy  40yo  at 33.1wks presents for routine prenatal visit     Pt denies contractions, vaginal bleeding, leakage of fluid. Endorses fetal movement.    -continue PNVs   -plan to bring birth plan next visit   -s/p tdap   - labor precautions   -follow up in 1 week    Gestational hypertension  -BP today wnl, asymptomatic   -baseline HELLP labs wnl  -continue ASA   -surveillance scheduled   -preeclampsia precautions provided   -IOL scheduled for 25

## 2025-05-06 NOTE — ASSESSMENT & PLAN NOTE
40yo  at 33.1wks presents for routine prenatal visit     Pt denies contractions, vaginal bleeding, leakage of fluid. Endorses fetal movement.    -continue PNVs   -plan to bring birth plan next visit   -s/p tdap   - labor precautions   -follow up in 1 week

## 2025-05-07 ENCOUNTER — ULTRASOUND (OUTPATIENT)
Age: 40
End: 2025-05-07
Payer: MEDICARE

## 2025-05-07 VITALS
DIASTOLIC BLOOD PRESSURE: 78 MMHG | SYSTOLIC BLOOD PRESSURE: 132 MMHG | HEART RATE: 94 BPM | WEIGHT: 208.6 LBS | BODY MASS INDEX: 35.61 KG/M2 | HEIGHT: 64 IN

## 2025-05-07 DIAGNOSIS — O28.8 NON-REACTIVE NST (NON-STRESS TEST): ICD-10-CM

## 2025-05-07 DIAGNOSIS — O13.3 GESTATIONAL HYPERTENSION, THIRD TRIMESTER: ICD-10-CM

## 2025-05-07 DIAGNOSIS — O09.513 PRIMIGRAVIDA OF ADVANCED MATERNAL AGE IN THIRD TRIMESTER: ICD-10-CM

## 2025-05-07 DIAGNOSIS — Z3A.33 33 WEEKS GESTATION OF PREGNANCY: Primary | ICD-10-CM

## 2025-05-07 PROCEDURE — 76818 FETAL BIOPHYS PROFILE W/NST: CPT | Performed by: OBSTETRICS & GYNECOLOGY

## 2025-05-07 NOTE — LETTER
May 7, 2025     Camron Parson MD  [address]  [address]  [facility] [address]    Patient: Reddy Garcia   YOB: 1985   Date of Visit: 5/7/2025       Dear Dr. Camron Parson MD:    Thank you for referring Reddy Garcia to me for evaluation. Below are my notes for this consultation.    If you have questions, please do not hesitate to call me. I look forward to following your patient along with you.         Sincerely,        Larry Padilla MD        CC: No Recipients    Larry Padilla MD  5/7/2025  3:32 PM  Sign when Signing Visit  A fetal ultrasound and NST were completed. See Ob procedures in Epic for an interpretation and recommendations. Do not hesitate to contact us in [facility] if you have questions.    Larry Padilla MD, MSCE  Maternal Fetal Medicine

## 2025-05-07 NOTE — PROGRESS NOTES
Repeat Non-Stress Testing:    Patient verbalizes +FM. Pt denies ALL:               Leaking of fluid   Contractions   Vaginal bleeding   Decreased fetal movement    Patient is performing daily kick counts. Patient has no questions or concerns.   NST strip reviewed by Dr. Padilla in-person.BPP done with U/S

## 2025-05-09 ENCOUNTER — HOSPITAL ENCOUNTER (INPATIENT)
Facility: HOSPITAL | Age: 40
LOS: 1 days | Discharge: HOME | DRG: 566 | End: 2025-05-10
Attending: OBSTETRICS & GYNECOLOGY | Admitting: STUDENT IN AN ORGANIZED HEALTH CARE EDUCATION/TRAINING PROGRAM
Payer: MEDICARE

## 2025-05-09 DIAGNOSIS — O13.3 GESTATIONAL HYPERTENSION, THIRD TRIMESTER: Primary | ICD-10-CM

## 2025-05-09 LAB
ALBUMIN SERPL BCG-MCNC: 3.4 G/DL (ref 3.5–5)
ALP SERPL-CCNC: 88 U/L (ref 34–104)
ALT SERPL W P-5'-P-CCNC: 13 U/L (ref 7–52)
ANION GAP SERPL CALCULATED.3IONS-SCNC: 10 MMOL/L (ref 4–13)
AST SERPL W P-5'-P-CCNC: 18 U/L (ref 13–39)
BILIRUB SERPL-MCNC: 0.22 MG/DL (ref 0.2–1)
BUN SERPL-MCNC: 11 MG/DL (ref 5–25)
CALCIUM ALBUM COR SERPL-MCNC: 9.6 MG/DL (ref 8.3–10.1)
CALCIUM SERPL-MCNC: 9.1 MG/DL (ref 8.4–10.2)
CHLORIDE SERPL-SCNC: 104 MMOL/L (ref 96–108)
CO2 SERPL-SCNC: 21 MMOL/L (ref 21–32)
CREAT SERPL-MCNC: 0.93 MG/DL (ref 0.6–1.3)
CREAT UR-MCNC: 153.2 MG/DL
ERYTHROCYTE [DISTWIDTH] IN BLOOD BY AUTOMATED COUNT: 12.9 % (ref 11.6–15.1)
GFR SERPL CREATININE-BSD FRML MDRD: 78 ML/MIN/1.73SQ M
GLUCOSE SERPL-MCNC: 94 MG/DL (ref 65–140)
HCT VFR BLD AUTO: 37.4 % (ref 34.8–46.1)
HGB BLD-MCNC: 12.6 G/DL (ref 11.5–15.4)
MCH RBC QN AUTO: 30 PG (ref 26.8–34.3)
MCHC RBC AUTO-ENTMCNC: 33.7 G/DL (ref 31.4–37.4)
MCV RBC AUTO: 89 FL (ref 82–98)
PLATELET # BLD AUTO: 212 THOUSANDS/UL (ref 149–390)
PMV BLD AUTO: 11.7 FL (ref 8.9–12.7)
POTASSIUM SERPL-SCNC: 4.1 MMOL/L (ref 3.5–5.3)
PROT SERPL-MCNC: 6.5 G/DL (ref 6.4–8.4)
PROT UR-MCNC: 25.8 MG/DL
PROT/CREAT UR: 0.2 MG/G{CREAT}
RBC # BLD AUTO: 4.2 MILLION/UL (ref 3.81–5.12)
SODIUM SERPL-SCNC: 135 MMOL/L (ref 135–147)
WBC # BLD AUTO: 9.21 THOUSAND/UL (ref 4.31–10.16)

## 2025-05-09 PROCEDURE — 4A1HXCZ MONITORING OF PRODUCTS OF CONCEPTION, CARDIAC RATE, EXTERNAL APPROACH: ICD-10-PCS | Performed by: OBSTETRICS & GYNECOLOGY

## 2025-05-09 PROCEDURE — 87150 DNA/RNA AMPLIFIED PROBE: CPT

## 2025-05-09 PROCEDURE — 80053 COMPREHEN METABOLIC PANEL: CPT

## 2025-05-09 PROCEDURE — 99214 OFFICE O/P EST MOD 30 MIN: CPT

## 2025-05-09 PROCEDURE — 82570 ASSAY OF URINE CREATININE: CPT

## 2025-05-09 PROCEDURE — 99222 1ST HOSP IP/OBS MODERATE 55: CPT | Performed by: OBSTETRICS & GYNECOLOGY

## 2025-05-09 PROCEDURE — 85027 COMPLETE CBC AUTOMATED: CPT

## 2025-05-09 PROCEDURE — 84156 ASSAY OF PROTEIN URINE: CPT

## 2025-05-09 RX ORDER — ACETAMINOPHEN 325 MG/1
975 TABLET ORAL EVERY 8 HOURS PRN
Status: DISCONTINUED | OUTPATIENT
Start: 2025-05-10 | End: 2025-05-10 | Stop reason: HOSPADM

## 2025-05-09 RX ORDER — MAGNESIUM SULFATE HEPTAHYDRATE 40 MG/ML
2 INJECTION, SOLUTION INTRAVENOUS ONCE
Status: COMPLETED | OUTPATIENT
Start: 2025-05-09 | End: 2025-05-09

## 2025-05-09 RX ORDER — MAGNESIUM SULFATE HEPTAHYDRATE 40 MG/ML
2 INJECTION, SOLUTION INTRAVENOUS ONCE
Status: COMPLETED | OUTPATIENT
Start: 2025-05-09 | End: 2025-05-10

## 2025-05-09 RX ORDER — ONDANSETRON 2 MG/ML
4 INJECTION INTRAMUSCULAR; INTRAVENOUS EVERY 6 HOURS PRN
Status: DISCONTINUED | OUTPATIENT
Start: 2025-05-09 | End: 2025-05-10 | Stop reason: HOSPADM

## 2025-05-09 RX ORDER — ACETAMINOPHEN 500 MG
1000 TABLET ORAL EVERY 6 HOURS PRN
COMMUNITY

## 2025-05-09 RX ORDER — HYDROXYZINE HYDROCHLORIDE 25 MG/1
25 TABLET, FILM COATED ORAL EVERY 6 HOURS PRN
Status: DISCONTINUED | OUTPATIENT
Start: 2025-05-09 | End: 2025-05-10 | Stop reason: HOSPADM

## 2025-05-09 RX ORDER — BETAMETHASONE SODIUM PHOSPHATE AND BETAMETHASONE ACETATE 3; 3 MG/ML; MG/ML
12 INJECTION, SUSPENSION INTRA-ARTICULAR; INTRALESIONAL; INTRAMUSCULAR; SOFT TISSUE EVERY 24 HOURS
Status: COMPLETED | OUTPATIENT
Start: 2025-05-09 | End: 2025-05-10

## 2025-05-09 RX ORDER — METOCLOPRAMIDE HYDROCHLORIDE 5 MG/ML
10 INJECTION INTRAMUSCULAR; INTRAVENOUS ONCE
Status: COMPLETED | OUTPATIENT
Start: 2025-05-09 | End: 2025-05-09

## 2025-05-09 RX ORDER — DIPHENHYDRAMINE HYDROCHLORIDE 50 MG/ML
25 INJECTION, SOLUTION INTRAMUSCULAR; INTRAVENOUS ONCE
Status: COMPLETED | OUTPATIENT
Start: 2025-05-09 | End: 2025-05-09

## 2025-05-09 RX ORDER — CALCIUM CARBONATE 500 MG/1
1000 TABLET, CHEWABLE ORAL 3 TIMES DAILY PRN
Status: DISCONTINUED | OUTPATIENT
Start: 2025-05-09 | End: 2025-05-10 | Stop reason: HOSPADM

## 2025-05-09 RX ORDER — ACETAMINOPHEN 10 MG/ML
1000 INJECTION, SOLUTION INTRAVENOUS ONCE
Status: COMPLETED | OUTPATIENT
Start: 2025-05-09 | End: 2025-05-09

## 2025-05-09 RX ADMIN — BETAMETHASONE SODIUM PHOSPHATE AND BETAMETHASONE ACETATE 12 MG: 3; 3 INJECTION, SUSPENSION INTRA-ARTICULAR; INTRALESIONAL; INTRAMUSCULAR at 18:36

## 2025-05-09 RX ADMIN — MAGNESIUM SULFATE HEPTAHYDRATE 2 G: 40 INJECTION, SOLUTION INTRAVENOUS at 15:12

## 2025-05-09 RX ADMIN — MAGNESIUM SULFATE HEPTAHYDRATE 2 G: 40 INJECTION, SOLUTION INTRAVENOUS at 23:09

## 2025-05-09 RX ADMIN — DIPHENHYDRAMINE HYDROCHLORIDE 25 MG: 50 INJECTION INTRAMUSCULAR; INTRAVENOUS at 23:07

## 2025-05-09 RX ADMIN — ACETAMINOPHEN 1000 MG: 10 INJECTION INTRAVENOUS at 15:57

## 2025-05-09 RX ADMIN — SODIUM CHLORIDE, SODIUM LACTATE, POTASSIUM CHLORIDE, AND CALCIUM CHLORIDE 1000 ML: .6; .31; .03; .02 INJECTION, SOLUTION INTRAVENOUS at 15:00

## 2025-05-09 RX ADMIN — DIPHENHYDRAMINE HYDROCHLORIDE 25 MG: 50 INJECTION, SOLUTION INTRAMUSCULAR; INTRAVENOUS at 15:04

## 2025-05-09 RX ADMIN — METOCLOPRAMIDE 10 MG: 5 INJECTION, SOLUTION INTRAMUSCULAR; INTRAVENOUS at 15:02

## 2025-05-09 RX ADMIN — METOCLOPRAMIDE 10 MG: 5 INJECTION, SOLUTION INTRAMUSCULAR; INTRAVENOUS at 23:07

## 2025-05-09 NOTE — ASSESSMENT & PLAN NOTE
Elevated BP in triage x2 at 28 wk    Presented elevated BP ranges at home associated with headache level 10  25 in Triage headache subsided  CBC wnl  CMP cr. 0.91 (almost double of base line), UPC 0.2    Plan:  Cr 0.5 this am, other labs stable  BP largely normotensive  HA resolved  Plan for continued outpatient management of gHTN with  testing, weekly labs and BP checks  Has delivery set up

## 2025-05-09 NOTE — H&P
H & P- Obstetrics   Reddy Garcia 39 y.o. female MRN: [id number]  Unit/Bed#:  TRIAGE 2- Encounter: [id number]    Assessment: 39 y.o.  at 33w4d admitted for elevated creatinine almost double of base line, to rule out preeclampsia.     FHT: reactive 140 bpm, irregular contractions.   Clinical EFW: 4 lbs 11 oz (63%)  33w2d ;    Plan:   Gestational hypertension  Assessment & Plan  Elevated BP in triage x2 at 28 wk    Presented elevated BP ranges at home associated with headache level 5/10  5 in Triage headache subsided  CBC wnl  CMP cr. 0.91 (almost double of base line), UPC 1.0    Plan:  Admit patient for observation overnight  BP cycling  PreE labs in the morning   NST intermittent  Reassessment in the morning          Discussed case and plan w/ Dr. Cool      Chief Complaint: headache and elevated BP range at home    HPI: Reddy Garcia is a 39 y.o.  with an MARIA TERESA of 2025, by Last Menstrual Period at 33w4d who is being admitted for observation to rule out preeclampsia. She presented in triage with history of headache that started this morning level 5 out of 10 she took her baby aspirin and Tylenol.  She checked her blood pressure at home and presented with elevated faded blood pressure in the range, she denies leakage of fluids decreased fetal movement, uterine contractions, visual disturbances, and abdominal pain.      Patient Active Problem List   Diagnosis    33 weeks gestation of pregnancy    Primigravida of advanced maternal age in third trimester    Episodic tension-type headache    Back and leg pain in pregnancy    Gestational hypertension       Baby complications/comments: none    Review of Systems   Constitutional:  Negative for chills and fever.   HENT:  Negative for ear pain and sore throat.    Eyes:  Negative for pain and visual disturbance.   Respiratory:  Negative for cough and shortness of breath.    Cardiovascular:  Negative for chest pain and palpitations.    Gastrointestinal:  Negative for abdominal pain, nausea and vomiting.   Genitourinary:  Negative for dysuria, hematuria and vaginal bleeding.   Musculoskeletal:  Negative for arthralgias and back pain.   Skin:  Negative for color change and rash.   Neurological:  Negative for seizures, syncope and headaches.   All other systems reviewed and are negative.      OB Hx:  OB History    Para Term  AB Living   1        SAB IAB Ectopic Multiple Live Births             # Outcome Date GA Lbr Georgi/2nd Weight Sex Type Anes PTL Lv   1 Current                Past Medical Hx:  Past Medical History:   Diagnosis Date    Migraine     migraines since teenager       Past Surgical hx:  Past Surgical History:   Procedure Laterality Date    INGUINAL HERNIA REPAIR         Social Hx:  Alcohol use: No  Tobacco use: No  Other substance use: No    No Known Allergies      Medications Prior to Admission:     acetaminophen (TYLENOL) 500 mg tablet    aspirin (Shweta Low Dose) 81 mg chewable tablet    magnesium oxide (MAG-OX) 400 mg tablet    Prenatal Vit-Fe Fumarate-FA (PRENATAL PO)    Objective:  Temp:  [98.7 °F (37.1 °C)] 98.7 °F (37.1 °C)  HR:  [74] 74  BP: (124-129)/(71-82) 124/71  Resp:  [20] 20  Body mass index is 35.81 kg/m².     Physical Exam:  Physical Exam  Constitutional:       Appearance: Normal appearance.   Genitourinary:      Vulva normal.   Cardiovascular:      Rate and Rhythm: Normal rate and regular rhythm.   Pulmonary:      Effort: Pulmonary effort is normal. No respiratory distress.   Abdominal:      Palpations: Abdomen is soft.      Tenderness: There is no abdominal tenderness.      Comments: Gravid   Musculoskeletal:         General: Normal range of motion.   Neurological:      Mental Status: She is alert and oriented to person, place, and time. Mental status is at baseline.   Skin:     General: Skin is warm and dry.   Psychiatric:         Mood and Affect: Mood normal.         Behavior: Behavior normal.   Vitals  reviewed.            FHT:  Baseline Rate (FHR): 140 bpm  Variability: Moderate  Accelerations: 15 x 15 or greater  Decelerations: None    TOCO:   Contraction Frequency (minutes): 6-7  Contraction Duration (seconds):     Lab Results   Component Value Date    WBC 9.21 05/09/2025    HGB 12.6 05/09/2025    HCT 37.4 05/09/2025     05/09/2025     Lab Results   Component Value Date    K 4.1 05/09/2025     05/09/2025    CO2 21 05/09/2025    BUN 11 05/09/2025    CREATININE 0.93 05/09/2025    AST 18 05/09/2025    ALT 13 05/09/2025     Prenatal Labs: Reviewed      Blood type: O negative  Antibody: Negative  GBS: collected   HIV: Non-reactive  Rubella: Immune  Syphilis IgM/IgG: Non-reactive  HBsAg: Non-reactive  HCAb: Non-reactive  Chlamydia: Negative  Gonorrhea: Negative  Diabetes 1 hour screen: 90  3 hour glucose: n/a  Platelets: 212  Hgb: 12.6  <2 Midnights  OBSERVATION    Signature/Title: Елена Barfield MD  Date: 5/9/2025  Time: 4:36 PM

## 2025-05-09 NOTE — ASSESSMENT & PLAN NOTE
labor at 33 weeks 4 days gestational age  Magnesium for neural protection  Betamethasone for lung development

## 2025-05-10 VITALS
WEIGHT: 208.6 LBS | HEART RATE: 72 BPM | HEIGHT: 64 IN | BODY MASS INDEX: 35.61 KG/M2 | SYSTOLIC BLOOD PRESSURE: 146 MMHG | RESPIRATION RATE: 18 BRPM | TEMPERATURE: 97.8 F | DIASTOLIC BLOOD PRESSURE: 84 MMHG | OXYGEN SATURATION: 97 %

## 2025-05-10 LAB
ABO GROUP BLD: NORMAL
ALBUMIN SERPL BCG-MCNC: 3.4 G/DL (ref 3.5–5)
ALP SERPL-CCNC: 85 U/L (ref 34–104)
ALT SERPL W P-5'-P-CCNC: 13 U/L (ref 7–52)
ANION GAP SERPL CALCULATED.3IONS-SCNC: 10 MMOL/L (ref 4–13)
AST SERPL W P-5'-P-CCNC: 16 U/L (ref 13–39)
BILIRUB SERPL-MCNC: 0.23 MG/DL (ref 0.2–1)
BLD GP AB SCN SERPL QL: POSITIVE
BLOOD GROUP ANTIBODIES SERPL: NORMAL
BUN SERPL-MCNC: 13 MG/DL (ref 5–25)
CALCIUM ALBUM COR SERPL-MCNC: 9.2 MG/DL (ref 8.3–10.1)
CALCIUM SERPL-MCNC: 8.7 MG/DL (ref 8.4–10.2)
CHLORIDE SERPL-SCNC: 106 MMOL/L (ref 96–108)
CO2 SERPL-SCNC: 19 MMOL/L (ref 21–32)
CREAT SERPL-MCNC: 0.56 MG/DL (ref 0.6–1.3)
CREAT UR-MCNC: 78.1 MG/DL
ERYTHROCYTE [DISTWIDTH] IN BLOOD BY AUTOMATED COUNT: 12.8 % (ref 11.6–15.1)
GFR SERPL CREATININE-BSD FRML MDRD: 118 ML/MIN/1.73SQ M
GLUCOSE SERPL-MCNC: 110 MG/DL (ref 65–140)
HCT VFR BLD AUTO: 36.8 % (ref 34.8–46.1)
HGB BLD-MCNC: 12.3 G/DL (ref 11.5–15.4)
MCH RBC QN AUTO: 29.9 PG (ref 26.8–34.3)
MCHC RBC AUTO-ENTMCNC: 33.4 G/DL (ref 31.4–37.4)
MCV RBC AUTO: 89 FL (ref 82–98)
PLATELET # BLD AUTO: 199 THOUSANDS/UL (ref 149–390)
PMV BLD AUTO: 11.7 FL (ref 8.9–12.7)
POTASSIUM SERPL-SCNC: 4.5 MMOL/L (ref 3.5–5.3)
PROT SERPL-MCNC: 6.4 G/DL (ref 6.4–8.4)
PROT UR-MCNC: 9.6 MG/DL
PROT/CREAT UR: 0.1 MG/G{CREAT}
RBC # BLD AUTO: 4.12 MILLION/UL (ref 3.81–5.12)
RH BLD: NEGATIVE
SODIUM SERPL-SCNC: 135 MMOL/L (ref 135–147)
SPECIMEN EXPIRATION DATE: NORMAL
WBC # BLD AUTO: 12.6 THOUSAND/UL (ref 4.31–10.16)

## 2025-05-10 PROCEDURE — 82570 ASSAY OF URINE CREATININE: CPT

## 2025-05-10 PROCEDURE — 86870 RBC ANTIBODY IDENTIFICATION: CPT

## 2025-05-10 PROCEDURE — 84156 ASSAY OF PROTEIN URINE: CPT

## 2025-05-10 PROCEDURE — 86780 TREPONEMA PALLIDUM: CPT

## 2025-05-10 PROCEDURE — 80053 COMPREHEN METABOLIC PANEL: CPT

## 2025-05-10 PROCEDURE — 85027 COMPLETE CBC AUTOMATED: CPT

## 2025-05-10 PROCEDURE — 86900 BLOOD TYPING SEROLOGIC ABO: CPT

## 2025-05-10 PROCEDURE — 86901 BLOOD TYPING SEROLOGIC RH(D): CPT

## 2025-05-10 PROCEDURE — 86850 RBC ANTIBODY SCREEN: CPT

## 2025-05-10 RX ADMIN — BETAMETHASONE SODIUM PHOSPHATE AND BETAMETHASONE ACETATE 12 MG: 3; 3 INJECTION, SUSPENSION INTRA-ARTICULAR; INTRALESIONAL; INTRAMUSCULAR at 17:01

## 2025-05-10 NOTE — PROGRESS NOTES
Notified by nurse that patient is feeling 2/10 headache with head pressure.  She has no other symptoms at this time.  Vitals as below.  Will continue to monitor BP and give headache cocktail.  Will be due for Tylenol at midnight.    Vitals:    05/09/25 2209   BP: 150/89   Pulse: 69   Resp: 18   Temp: 97.8 °F (36.6 °C)   SpO2: 97%         Miriam Paz MD  OBGYN Resident  05/09/25  10:30 PM

## 2025-05-10 NOTE — PLAN OF CARE
Problem: ANTEPARTUM  Goal: Maintain pregnancy as long as maternal and/or fetal condition is stable  Description: INTERVENTIONS:- Maternal surveillance- Fetal surveillance- Monitor uterine activity- Medications as ordered- Bedrest  5/10/2025 1848 by Victor M Rodriguez RN  Outcome: Adequate for Discharge  5/10/2025 1848 by Victor M Rodriguez RN  Outcome: Progressing  5/10/2025 0945 by Victor M Rodriguez RN  Outcome: Progressing     Problem: PAIN - ADULT  Goal: Verbalizes/displays adequate comfort level or baseline comfort level  Description: Interventions:- Encourage patient to monitor pain and request assistance- Assess pain using appropriate pain scale- Administer analgesics based on type and severity of pain and evaluate response- Implement non-pharmacological measures as appropriate and evaluate response- Consider cultural and social influences on pain and pain management- Notify physician/advanced practitioner if interventions unsuccessful or patient reports new pain  5/10/2025 1848 by Victor M Rodriguez RN  Outcome: Adequate for Discharge  5/10/2025 1848 by Victor M Rodriguez RN  Outcome: Progressing  5/10/2025 0945 by Victor M Rodriguez RN  Outcome: Progressing     Problem: INFECTION - ADULT  Goal: Absence or prevention of progression during hospitalization  Description: INTERVENTIONS:- Assess and monitor for signs and symptoms of infection- Monitor lab/diagnostic results- Monitor all insertion sites, i.e. indwelling lines, tubes, and drains- Monitor endotracheal if appropriate and nasal secretions for changes in amount and color- [place] appropriate cooling/warming therapies per order- Administer medications as ordered- Instruct and encourage patient and family to use good hand hygiene technique- Identify and instruct in appropriate isolation precautions for identified infection/condition  5/10/2025 1848 by Victor M Rodriguez RN  Outcome: Adequate for  Discharge  5/10/2025 1848 by Victor M Rodriguez RN  Outcome: Progressing  5/10/2025 0945 by Victor M Rodriguez RN  Outcome: Progressing  Goal: Absence of fever/infection during neutropenic period  Description: INTERVENTIONS:- Monitor WBC  5/10/2025 1848 by Victor M Rodriguez RN  Outcome: Adequate for Discharge  5/10/2025 1848 by Victor M Rodriguez RN  Outcome: Progressing  5/10/2025 0945 by Victor M Rodriguez RN  Outcome: Progressing     Problem: SAFETY ADULT  Goal: Patient will remain free of falls  Description: INTERVENTIONS:- Educate patient/family on patient safety including physical limitations- Instruct patient to call for assistance with activity - Consult OT/PT to assist with strengthening/mobility - Keep Call bell within reach- Keep bed low and locked with side rails adjusted as appropriate- Keep care items and personal belongings within reach- Initiate and maintain comfort rounds-    5/10/2025 1848 by Victor M Rodriguez RN  Outcome: Adequate for Discharge  5/10/2025 1848 by Victor M Rodriguez RN  Outcome: Progressing  5/10/2025 0945 by Victor M Rodriguez RN  Outcome: Progressing  Goal: Maintain or return to baseline ADL function  Description: INTERVENTIONS:-  Assess patient's ability to carry out ADLs; assess patient's baseline for ADL function and identify physical deficits which impact ability to perform ADLs (bathing, care of mouth/teeth, toileting, grooming, dressing, etc.)- Assess/evaluate cause of self-care deficits - Assess range of motion- Assess patient's mobility; develop plan if impaired- Assess patient's need for assistive devices and provide as appropriate- Encourage maximum independence but intervene and supervise when necessary- Involve family in performance of ADLs- Assess for home care needs following discharge - Consider OT consult to assist with ADL evaluation and planning for discharge- Provide patient education as appropriate  5/10/2025  1848 by Victor M Rodriguez RN  Outcome: Adequate for Discharge  5/10/2025 1848 by Victor M Rodriguez RN  Outcome: Progressing  5/10/2025 0945 by Victor M Rodriguez RN  Outcome: Progressing  Goal: Maintains/Returns to pre admission functional level  Description: INTERVENTIONS:- Perform AM-PAC 6 Click Basic Mobility/ Daily Activity assessment daily.- Set and communicate daily mobility goal to care team and patient/family/caregiver. - Collaborate with rehabilitation services on mobility goals if consulted-    5/10/2025 1848 by Victor M Rodriguez RN  Outcome: Adequate for Discharge  5/10/2025 1848 by Victor M Rodriguez RN  Outcome: Progressing  5/10/2025 0945 by Victor M Rodriguez RN  Outcome: Progressing     Problem: Knowledge Deficit  Goal: Patient/family/caregiver demonstrates understanding of disease process, treatment plan, medications, and discharge instructions  Description: Complete learning assessment and assess knowledge base.Interventions:- Provide teaching at level of understanding- Provide teaching via preferred learning methods  5/10/2025 1848 by Vicotr M Rodriguez RN  Outcome: Adequate for Discharge  5/10/2025 1848 by Victor M Rodriguez RN  Outcome: Progressing  5/10/2025 0945 by Victor M Rodriguez RN  Outcome: Progressing     Problem: DISCHARGE PLANNING  Goal: Discharge to home or other facility with appropriate resources  Description: INTERVENTIONS:- Identify barriers to discharge w/patient and caregiver- Arrange for needed discharge resources and transportation as appropriate- Identify discharge learning needs (meds, wound care, etc.)- Arrange for interpretive services to assist at discharge as needed- Refer to Case Management Department for coordinating discharge planning if the patient needs post-hospital services based on physician/advanced practitioner order or complex needs related to functional status, cognitive ability, or social support  system  5/10/2025 1848 by Victor M Rodriguez RN  Outcome: Adequate for Discharge  5/10/2025 1848 by Victor M Rodriguez RN  Outcome: Progressing  5/10/2025 0945 by Victor M Rodriguez RN  Outcome: Progressing

## 2025-05-10 NOTE — PLAN OF CARE
Problem: ANTEPARTUM  Goal: Maintain pregnancy as long as maternal and/or fetal condition is stable  Description: INTERVENTIONS:- Maternal surveillance- Fetal surveillance- Monitor uterine activity- Medications as ordered- Bedrest  Outcome: Progressing     Problem: PAIN - ADULT  Goal: Verbalizes/displays adequate comfort level or baseline comfort level  Description: Interventions:- Encourage patient to monitor pain and request assistance- Assess pain using appropriate pain scale- Administer analgesics based on type and severity of pain and evaluate response- Implement non-pharmacological measures as appropriate and evaluate response- Consider cultural and social influences on pain and pain management- Notify physician/advanced practitioner if interventions unsuccessful or patient reports new pain  Outcome: Progressing     Problem: INFECTION - ADULT  Goal: Absence or prevention of progression during hospitalization  Description: INTERVENTIONS:- Assess and monitor for signs and symptoms of infection- Monitor lab/diagnostic results- Monitor all insertion sites, i.e. indwelling lines, tubes, and drains- Monitor endotracheal if appropriate and nasal secretions for changes in amount and color- [place] appropriate cooling/warming therapies per order- Administer medications as ordered- Instruct and encourage patient and family to use good hand hygiene technique- Identify and instruct in appropriate isolation precautions for identified infection/condition  Outcome: Progressing  Goal: Absence of fever/infection during neutropenic period  Description: INTERVENTIONS:- Monitor WBC  Outcome: Progressing     Problem: SAFETY ADULT  Goal: Patient will remain free of falls  Description: INTERVENTIONS:- Educate patient/family on patient safety including physical limitations- Instruct patient to call for assistance with activity - Consult OT/PT to assist with strengthening/mobility - Keep Call bell within reach- Keep bed low and  locked with side rails adjusted as appropriate- Keep care items and personal belongings within reach- Initiate and maintain comfort rounds-    Outcome: Progressing  Goal: Maintain or return to baseline ADL function  Description: INTERVENTIONS:-  Assess patient's ability to carry out ADLs; assess patient's baseline for ADL function and identify physical deficits which impact ability to perform ADLs (bathing, care of mouth/teeth, toileting, grooming, dressing, etc.)- Assess/evaluate cause of self-care deficits - Assess range of motion- Assess patient's mobility; develop plan if impaired- Assess patient's need for assistive devices and provide as appropriate- Encourage maximum independence but intervene and supervise when necessary- Involve family in performance of ADLs- Assess for home care needs following discharge - Consider OT consult to assist with ADL evaluation and planning for discharge- Provide patient education as appropriate  Outcome: Progressing  Goal: Maintains/Returns to pre admission functional level  Description: INTERVENTIONS:- Perform AM-PAC 6 Click Basic Mobility/ Daily Activity assessment daily.- Set and communicate daily mobility goal to care team and patient/family/caregiver. - Collaborate with rehabilitation services on mobility goals if consulted-    Outcome: Progressing     Problem: Knowledge Deficit  Goal: Patient/family/caregiver demonstrates understanding of disease process, treatment plan, medications, and discharge instructions  Description: Complete learning assessment and assess knowledge base.Interventions:- Provide teaching at level of understanding- Provide teaching via preferred learning methods  Outcome: Progressing     Problem: DISCHARGE PLANNING  Goal: Discharge to home or other facility with appropriate resources  Description: INTERVENTIONS:- Identify barriers to discharge w/patient and caregiver- Arrange for needed discharge resources and transportation as appropriate- Identify  discharge learning needs (meds, wound care, etc.)- Arrange for interpretive services to assist at discharge as needed- Refer to Case Management Department for coordinating discharge planning if the patient needs post-hospital services based on physician/advanced practitioner order or complex needs related to functional status, cognitive ability, or social support system  Outcome: Progressing

## 2025-05-10 NOTE — PROGRESS NOTES
Progress Note - OB/GYN   Name: Reddy Garcia 39 y.o. female I MRN: [id number]  Unit/Bed#: -01 I Date of Admission: 2025   Date of Service: 5/10/2025 I Hospital Day: 1    Assessment & Plan  33 weeks gestation of pregnancy  BMZ #1 given , plan home after second dose today  Primigravida of advanced maternal age in third trimester    Gestational hypertension  Elevated BP in triage x2 at 28 wk    Presented elevated BP ranges at home associated with headache level 5/10  5/9/25 in Triage headache subsided  CBC wnl  CMP cr. 0.91 (almost double of base line), UPC 0.2    Plan:  Cr 0.5 this am, other labs stable  BP largely normotensive  HA resolved  Plan for continued outpatient management of gHTN with  testing, weekly labs and BP checks  Has delivery set up        OB L&D Progress Note  Subjective   Patient had a good night. No headache. No contractions, leaking or bleeding. Good fetal movement.    Objective :  Temp:  [97.8 °F (36.6 °C)-98.7 °F (37.1 °C)] 98 °F (36.7 °C)  HR:  [69-92] 85  BP: (120-150)/(68-89) 123/68  Resp:  [15-20] 15  SpO2:  [97 %] 97 %  O2 Device: None (Room air)    Physical Exam  Vitals and nursing note reviewed.   Constitutional:       General: She is not in acute distress.     Appearance: Normal appearance. She is not ill-appearing.   HENT:      Head: Normocephalic and atraumatic.      Nose: No congestion.   Eyes:      Extraocular Movements: Extraocular movements intact.      Conjunctiva/sclera: Conjunctivae normal.   Pulmonary:      Effort: Pulmonary effort is normal. No respiratory distress.   Abdominal:      Palpations: Abdomen is soft.      Tenderness: There is no abdominal tenderness.      Comments: Gravid, nontender   Musculoskeletal:         General: Normal range of motion.      Cervical back: Normal range of motion.   Skin:     General: Skin is warm and dry.   Neurological:      General: No focal deficit present.      Mental Status: She is alert and oriented to  person, place, and time. Mental status is at baseline.   Psychiatric:         Mood and Affect: Mood normal.         Behavior: Behavior normal.         Thought Content: Thought content normal.         Judgment: Judgment normal.           Lab Results: I have reviewed the following results:CBC/BMP:   .     05/10/25  0510   WBC 12.60*   HGB 12.3   HCT 36.8      SODIUM 135   K 4.5      CO2 19*   BUN 13   CREATININE 0.56*   GLUC 110    , LFTs:   .     05/10/25  0510   AST 16   ALT 13   ALB 3.4*   TBILI 0.23   ALKPHOS 85

## 2025-05-10 NOTE — PLAN OF CARE
Problem: ANTEPARTUM  Goal: Maintain pregnancy as long as maternal and/or fetal condition is stable  Description: INTERVENTIONS:- Maternal surveillance- Fetal surveillance- Monitor uterine activity- Medications as ordered- Bedrest  Outcome: Progressing     Problem: PAIN - ADULT  Goal: Verbalizes/displays adequate comfort level or baseline comfort level  Description: Interventions:- Encourage patient to monitor pain and request assistance- Assess pain using appropriate pain scale- Administer analgesics based on type and severity of pain and evaluate response- Implement non-pharmacological measures as appropriate and evaluate response- Consider cultural and social influences on pain and pain management- Notify physician/advanced practitioner if interventions unsuccessful or patient reports new pain  Outcome: Progressing     Problem: INFECTION - ADULT  Goal: Absence or prevention of progression during hospitalization  Description: INTERVENTIONS:- Assess and monitor for signs and symptoms of infection- Monitor lab/diagnostic results- Monitor all insertion sites, i.e. indwelling lines, tubes, and drains- Monitor endotracheal if appropriate and nasal secretions for changes in amount and color- [place] appropriate cooling/warming therapies per order- Administer medications as ordered- Instruct and encourage patient and family to use good hand hygiene technique- Identify and instruct in appropriate isolation precautions for identified infection/condition  Outcome: Progressing  Goal: Absence of fever/infection during neutropenic period  Description: INTERVENTIONS:- Monitor WBC  Outcome: Progressing     Problem: SAFETY ADULT  Goal: Patient will remain free of falls  Description: INTERVENTIONS:- Educate patient/family on patient safety including physical limitations- Instruct patient to call for assistance with activity - Consult OT/PT to assist with strengthening/mobility - Keep Call bell within reach- Keep bed low and  locked with side rails adjusted as appropriate- Keep care items and personal belongings within reach- Initiate and maintain comfort rounds-    Outcome: Progressing  Goal: Maintain or return to baseline ADL function  Description: INTERVENTIONS:-  Assess patient's ability to carry out ADLs; assess patient's baseline for ADL function and identify physical deficits which impact ability to perform ADLs (bathing, care of mouth/teeth, toileting, grooming, dressing, etc.)- Assess/evaluate cause of self-care deficits - Assess range of motion- Assess patient's mobility; develop plan if impaired- Assess patient's need for assistive devices and provide as appropriate- Encourage maximum independence but intervene and supervise when necessary- Involve family in performance of ADLs- Assess for home care needs following discharge - Consider OT consult to assist with ADL evaluation and planning for discharge- Provide patient education as appropriate  Outcome: Progressing  Goal: Maintains/Returns to pre admission functional level  Description: INTERVENTIONS:- Perform AM-PAC 6 Click Basic Mobility/ Daily Activity assessment daily.- Set and communicate daily mobility goal to care team and patient/family/caregiver. - Collaborate with rehabilitation services on mobility goals if consulted-    Outcome: Progressing     Problem: Knowledge Deficit  Goal: Patient/family/caregiver demonstrates understanding of disease process, treatment plan, medications, and discharge instructions  Description: Complete learning assessment and assess knowledge base.Interventions:- Provide teaching at level of understanding- Provide teaching via preferred learning methods  Outcome: Progressing     Problem: DISCHARGE PLANNING  Goal: Discharge to home or other facility with appropriate resources  Description: INTERVENTIONS:- Identify barriers to discharge w/patient and caregiver- Arrange for needed discharge resources and transportation as appropriate- Identify  discharge learning needs (meds, wound care, etc.)- Arrange for interpretive services to assist at discharge as needed- Refer to Case Management Department for coordinating discharge planning if the patient needs post-hospital services based on physician/advanced practitioner order or complex needs related to functional status, cognitive ability, or social support system  Outcome: Progressing

## 2025-05-11 LAB
GP B STREP DNA SPEC QL NAA+PROBE: NEGATIVE
TREPONEMA PALLIDUM IGG+IGM AB [PRESENCE] IN SERUM OR PLASMA BY IMMUNOASSAY: NORMAL

## 2025-05-12 NOTE — UTILIZATION REVIEW
Initial Clinical Review    Admission: Date/Time/Statement:   Admission Orders (From admission, onward)       Ordered        05/09/25 1649  Inpatient Admission  Once                          Orders Placed This Encounter   Procedures    Inpatient Admission     Standing Status:   Standing     Number of Occurrences:   1     Level of Care:   Med Surg [16]     Estimated length of stay:   More than 2 Midnights     Certification:   I certify that inpatient services are medically necessary for this patient for a duration of greater than two midnights. See H&P and MD Progress Notes for additional information about the patient's course of treatment.       Chief Complaint   Patient presents with    Headache       Initial Presentation: 39 y.o. female presented to L&D as inpatient admission for elevated creatinine almost double of base line, to rule out preeclampsia.  G 1 @ 33 w 4 d.  She presented in triage with history of headache that started this morning level 5 out of 10 she took her baby aspirin and Tylenol.  She checked her blood pressure at home and presented with elevated faded blood pressure in the range, she denies leakage of fluids decreased fetal movement, uterine contractions, visual disturbances, and abdominal pain. Plan NST TID, BTM X 2 doses, monitor BP's, pre E labs and supportive care.  Will continue to monitor BP and give headache cocktail.   FHT:  Baseline Rate (FHR): 140 bpm  Variability: Moderate  Accelerations: 15 x 15 or greater  Decelerations: None     TOCO:   Contraction Frequency (minutes): 6-7  Contraction Duration (seconds):        Date: 05-10-25   Day 2: 33 weeks gestation of pregnancy BMZ #1 given 5/9, plan home after second dose today. Patient had a good night. No headache. No contractions, leaking or bleeding. Good fetal movement.   Primigravida of advanced maternal age in third trimester     Gestational hypertension  Elevated BP in triage x2 at 28 wk     Presented elevated BP ranges at  home associated with headache level 5/10  5/9/25 in Triage headache subsided  CBC wnl  CMP cr. 0.91 (almost double of base line), UPC 0.2     Plan:  Cr 0.5 this am, other labs stable  BP largely normotensive  HA resolved  Plan for continued outpatient management of gHTN with  testing, weekly labs and BP checks  Has delivery set up      Scheduled Medications:  No current facility-administered medications for this encounter.    Continuous IV Infusions:  No current facility-administered medications for this encounter.    PRN Meds:  No current facility-administered medications for this encounter.    OB Triage Vitals   Temperature Pulse Respirations Blood Pressure SpO2 Pain Score   25 1414 25 1431 25 1414 25 1431 25 2209 25 1414   98.7 °F (37.1 °C) 92 20 131/80 97 % 5     Weight (last 2 days) before discharge       Date/Time Weight    05/10/25 1924 --    Comment rows:    OBSERV: BP taken d/t pt request, SR resident notified and pt approved for D/C at 05/10/25 1924    05/10/25 08 --    Comment rows:    OBSERV: according to the patient the baby is active and denies s/s of labor. at 05/10/25 0803    25 1414 94.6 (208.6)            Vital Signs (last 3 days) before discharge       Date/Time Temp Pulse Resp BP MAP (mmHg) SpO2 O2 Device Cardiac (WDL) Patient Position - Orthostatic VS Pain    05/10/25 1924 -- -- -- 146/84 -- -- -- -- Lying --    OBSERV: BP taken d/t pt request, SR resident notified and pt approved for D/C at 05/10/25 1924    05/10/25 1623 97.8 °F (36.6 °C) 72 18 132/79 -- 97 % -- -- Lying No Pain    05/10/25 1208 98.2 °F (36.8 °C) 93 18 131/83 -- 98 % -- -- -- No Pain    05/10/25 0803 97.9 °F (36.6 °C) 90 16 128/81 -- 98 % -- -- -- No Pain    OBSERV: according to the patient the baby is active and denies s/s of labor. at 05/10/25 0803    05/10/25 0413 98 °F (36.7 °C) 85 15 123/68 -- 97 % None (Room air) -- Lying No Pain    05/10/25 0010 98.3 °F (36.8 °C) 70 16  120/73 92 97 % None (Room air) -- Lying No Pain    05/10/25 0000 -- -- -- -- -- -- -- WDL -- --    05/09/25 2310 -- 68 -- 131/79 -- -- -- -- -- --    05/09/25 2209 97.8 °F (36.6 °C) 69 18 150/89 113 97 % None (Room air) -- Sitting 2    05/09/25 1554 -- 74 -- 124/71 -- -- -- -- -- --    05/09/25 1551 -- 74 -- -- -- -- -- -- -- --    05/09/25 1535 -- 74 -- 129/82 -- -- -- -- -- --    05/09/25 1445 -- 92 -- 131/83 -- -- -- -- -- --    05/09/25 1431 -- 92 -- 131/80 -- -- -- -- -- --    05/09/25 1414 98.7 °F (37.1 °C) -- 20 -- -- -- -- -- -- 5              Pertinent Labs/Diagnostic Test Results:       Results from last 7 days   Lab Units 05/10/25  0510 05/09/25  1501   WBC Thousand/uL 12.60* 9.21   HEMOGLOBIN g/dL 12.3 12.6   HEMATOCRIT % 36.8 37.4   PLATELETS Thousands/uL 199 212         Results from last 7 days   Lab Units 05/10/25  0510 05/09/25  1501   SODIUM mmol/L 135 135   POTASSIUM mmol/L 4.5 4.1   CHLORIDE mmol/L 106 104   CO2 mmol/L 19* 21   ANION GAP mmol/L 10 10   BUN mg/dL 13 11   CREATININE mg/dL 0.56* 0.93   EGFR ml/min/1.73sq m 118 78   CALCIUM mg/dL 8.7 9.1     Results from last 7 days   Lab Units 05/10/25  0510 05/09/25  1501   AST U/L 16 18   ALT U/L 13 13   ALK PHOS U/L 85 88   TOTAL PROTEIN g/dL 6.4 6.5   ALBUMIN g/dL 3.4* 3.4*   TOTAL BILIRUBIN mg/dL 0.23 0.22         Results from last 7 days   Lab Units 05/10/25  0510 05/09/25  1501   GLUCOSE RANDOM mg/dL 110 94           Results from last 7 days   Lab Units 05/10/25  0909 05/09/25  1501 05/06/25  1455   GLUCOSE UA   --   --  neg   PROTEIN UA   --   --  neg   CREATININE UR mg/dL 78.1 153.2  --    PROTEIN UR mg/dL 9.6 25.8  --    PROT/CREAT RATIO UR  0.1 0.2  --            Past Medical History:   Diagnosis Date    Migraine     migraines since teenager     Present on Admission:   Gestational hypertension   Primigravida of advanced maternal age in third trimester      Admitting Diagnosis: Headache [R51.9]  Age/Sex: 39 y.o. female    Network  Utilization Review Department  ATTENTION: Please call with any questions or concerns to [phone] and carefully listen to the prompts so that you are directed to the right person. All voicemails are confidential.   For Discharge needs, contact Care Management DC Support Team at [phone] opt. [phone]  Send all requests for admission clinical reviews, approved or denied determinations and any other requests to dedicated fax number below belonging to the campus where the patient is receiving treatment. List of dedicated fax numbers for the Facilities:  FACILITY NAME UR FAX NUMBER   ADMISSION DENIALS (Administrative/Medical Necessity) [phone]   DISCHARGE SUPPORT TEAM (NETWORK) [phone]   PARENT CHILD HEALTH (Maternity/NICU/Pediatrics) [phone]   [facility] [phone]   [facility] [phone]   [facility] [phone]   [facility] [phone]   [facility] [phone]   [facility] [phone]   [facility] [phone]   [facility] [phone]   [facility] [phone]   [facility] [phone]   [facility] [phone]   [facility] [phone]

## 2025-05-13 ENCOUNTER — APPOINTMENT (OUTPATIENT)
Dept: LAB | Facility: CLINIC | Age: 40
End: 2025-05-13
Payer: MEDICARE

## 2025-05-13 ENCOUNTER — RESULTS FOLLOW-UP (OUTPATIENT)
Dept: OBGYN CLINIC | Facility: MEDICAL CENTER | Age: 40
End: 2025-05-13

## 2025-05-13 ENCOUNTER — ULTRASOUND (OUTPATIENT)
Dept: PERINATAL CARE | Facility: CLINIC | Age: 40
End: 2025-05-13
Payer: MEDICARE

## 2025-05-13 VITALS
SYSTOLIC BLOOD PRESSURE: 130 MMHG | BODY MASS INDEX: 35.71 KG/M2 | DIASTOLIC BLOOD PRESSURE: 78 MMHG | HEART RATE: 88 BPM | HEIGHT: 64 IN | WEIGHT: 209.2 LBS

## 2025-05-13 DIAGNOSIS — O13.3 GESTATIONAL HYPERTENSION, THIRD TRIMESTER: ICD-10-CM

## 2025-05-13 DIAGNOSIS — Z3A.34 34 WEEKS GESTATION OF PREGNANCY: ICD-10-CM

## 2025-05-13 DIAGNOSIS — O13.3 GESTATIONAL HYPERTENSION, THIRD TRIMESTER: Primary | ICD-10-CM

## 2025-05-13 LAB
ALBUMIN SERPL BCG-MCNC: 3.3 G/DL (ref 3.5–5)
ALP SERPL-CCNC: 81 U/L (ref 34–104)
ALT SERPL W P-5'-P-CCNC: 15 U/L (ref 7–52)
ANION GAP SERPL CALCULATED.3IONS-SCNC: 8 MMOL/L (ref 4–13)
AST SERPL W P-5'-P-CCNC: 17 U/L (ref 13–39)
BILIRUB SERPL-MCNC: 0.24 MG/DL (ref 0.2–1)
BUN SERPL-MCNC: 14 MG/DL (ref 5–25)
CALCIUM ALBUM COR SERPL-MCNC: 9.5 MG/DL (ref 8.3–10.1)
CALCIUM SERPL-MCNC: 8.9 MG/DL (ref 8.4–10.2)
CHLORIDE SERPL-SCNC: 100 MMOL/L (ref 96–108)
CO2 SERPL-SCNC: 26 MMOL/L (ref 21–32)
CREAT SERPL-MCNC: 0.65 MG/DL (ref 0.6–1.3)
CREAT UR-MCNC: 160.7 MG/DL
ERYTHROCYTE [DISTWIDTH] IN BLOOD BY AUTOMATED COUNT: 13.2 % (ref 11.6–15.1)
GFR SERPL CREATININE-BSD FRML MDRD: 112 ML/MIN/1.73SQ M
GLUCOSE P FAST SERPL-MCNC: 66 MG/DL (ref 65–99)
HCT VFR BLD AUTO: 41.5 % (ref 34.8–46.1)
HGB BLD-MCNC: 13.2 G/DL (ref 11.5–15.4)
MCH RBC QN AUTO: 29.4 PG (ref 26.8–34.3)
MCHC RBC AUTO-ENTMCNC: 31.8 G/DL (ref 31.4–37.4)
MCV RBC AUTO: 92 FL (ref 82–98)
PLATELET # BLD AUTO: 222 THOUSANDS/UL (ref 149–390)
PMV BLD AUTO: 12.4 FL (ref 8.9–12.7)
POTASSIUM SERPL-SCNC: 5.1 MMOL/L (ref 3.5–5.3)
PROT SERPL-MCNC: 6.6 G/DL (ref 6.4–8.4)
PROT UR-MCNC: 13.5 MG/DL
PROT/CREAT UR: 0.1 MG/G{CREAT}
RBC # BLD AUTO: 4.49 MILLION/UL (ref 3.81–5.12)
SODIUM SERPL-SCNC: 134 MMOL/L (ref 135–147)
WBC # BLD AUTO: 12.66 THOUSAND/UL (ref 4.31–10.16)

## 2025-05-13 PROCEDURE — 99213 OFFICE O/P EST LOW 20 MIN: CPT | Performed by: NURSE PRACTITIONER

## 2025-05-13 PROCEDURE — 76815 OB US LIMITED FETUS(S): CPT | Performed by: NURSE PRACTITIONER

## 2025-05-13 PROCEDURE — 80053 COMPREHEN METABOLIC PANEL: CPT

## 2025-05-13 PROCEDURE — 36415 COLL VENOUS BLD VENIPUNCTURE: CPT

## 2025-05-13 PROCEDURE — 84156 ASSAY OF PROTEIN URINE: CPT

## 2025-05-13 PROCEDURE — 82570 ASSAY OF URINE CREATININE: CPT

## 2025-05-13 PROCEDURE — 59025 FETAL NON-STRESS TEST: CPT | Performed by: NURSE PRACTITIONER

## 2025-05-13 PROCEDURE — 85027 COMPLETE CBC AUTOMATED: CPT

## 2025-05-13 NOTE — PROGRESS NOTES
[facility]: Ms. John Garcia was seen today at 34w1d gestational age for NST (found under the pregnancy episode) which I reviewed the RN assessment and agree, and HAYDEE (see ultrasound report under OB procedures tab).       Jennifer SERRANO    I spent 10 minutes devoted to patient care (3 min chart preparation, 4 minutes face to face and  3 minutes documenting).

## 2025-05-13 NOTE — PROGRESS NOTES
Repeat Non-Stress Testing:    Patient verbalizes +FM. Pt denies ALL:               Leaking of fluid   Contractions   Vaginal bleeding   Decreased fetal movement    Patient is performing daily kick counts. Patient has no questions or concerns.   NST strip reviewed by ZACH Soares in-person.

## 2025-05-13 NOTE — PATIENT INSTRUCTIONS
Thank you for choosing us for your  care today.  If you have any questions about your ultrasound or care, please do not hesitate to contact us or your primary obstetrician.        Some general instructions for your pregnancy are:    Exercise: Aim for 150 minutes per week of regular exercise.  Walking is great!  Nutrition: Choose healthy sources of calcium, iron, and protein.  Avoid ultraprocessed foods and added sugar.  Learn about Preeclampsia: preeclampsia is a common, potentially serious high blood pressure complication in pregnancy.  A blood pressure of 140mmHg (systolic or top number) or 90mmHg (diastolic or bottom number) should be evaluated by your doctor.  Aspirin is sometimes prescribed in early pregnancy to prevent preeclampsia in women with risk factors - ask your obstetrician if you should be on this medication.  For more resources, visit:  https://www.highriskpregnancyinfo.org/preeclampsia  If you smoke, please try to quit completely but also try to reduce your smoking by as much as possible (as soon as possible).  Do not vape.  Please also avoid cannabis products.  Other warning signs to watch out for in pregnancy or postpartum: chest pain, obstructed breathing or shortness of breath, seizures, thoughts of hurting yourself or your baby, bleeding, a painful or swollen leg, fever, or headache (see [facility] Warning Sign[facility]).  If these happen call 911.  Itching is also not normal in pregnancy and if you experience this, especially over your hands and feet, potentially worse at night, notify your doctors.     Kick Counts in Pregnancy   AMBULATORY CARE:   Kick counts  measure how much your baby is moving in your womb. A kick from your baby can be felt as a twist, turn, swish, roll, or jab. It is common to feel your baby kicking at 26 to 28 weeks of pregnancy. You may feel your baby kick as early as 20 weeks of pregnancy. You may want to start counting at 28 weeks.   Contact your  doctor immediately if:   You feel a change in the number of kicks or movements of your baby.      You feel fewer than 10 kicks within 2 hours.      You have questions or concerns about your baby's movements.     Why measure kick counts:  Your baby's movement may provide information about your baby's health. He or she may move less, or not at all, if there are problems. Your baby may move less if he or she is not getting enough oxygen or nutrition from the placenta. Do not smoke while you are pregnant. Smoking decreases the amount of oxygen that gets to your baby. Talk to your healthcare provider if you need help to quit smoking. Tell your healthcare provider as soon as you feel a change in your baby's movements.  When to measure kick counts:   Measure kick counts at the same time every day.       Measure kick counts when your baby is awake and most active. Your baby may be most active in the evening.     How to measure kick counts:  Check that your baby is awake before you measure kick counts. You can wake up your baby by lightly pushing on your belly, walking, or drinking something cold. Your healthcare provider may tell you different ways to measure kick counts. You may be told to do the following:  Use a chart or clock to keep track of the time you start and finish counting.      Sit in a chair or lie on your left side.      Place your hands on the largest part of your belly.      Count until you reach 10 kicks. Write down how much time it takes to count 10 kicks.      It may take 30 minutes to 2 hours to count 10 kicks. It should not take more than 2 hours to count 10 kicks.     Follow up with your doctor as directed:  Write down your questions so you remember to ask them during your visits.   © Copyright [employer] 2023 Information is for End User's use only and may not be sold, redistributed or otherwise used for commercial purposes.  The above information is an  only. It is not intended as  medical advice for individual conditions or treatments. Talk to your doctor, nurse or pharmacist before following any medical regimen to see if it is safe and effective for you. Nonstress Test for Pregnancy   WHAT YOU NEED TO KNOW:   What do I need to know about a nonstress test?  A nonstress test measures your baby's heart rate and movements. Nonstress means that no stress will be placed on your baby during the test.  How do I prepare for a nonstress test?  Your healthcare provider will talk to you about how to prepare for this test. Your provider may tell you to eat and drink plenty of liquids before your test. If you smoke, you may be asked not to smoke within 2 hours before the test. Your provider will also tell you which medicines to take or not take on the day of your test.  What will happen during a nonstress test?  You may be asked to lie down or recline back for the test on a bed. One or 2 belts with sensors will be placed around your abdomen. Your baby's heart rate will be recorded with a machine. If your baby does not move, your baby may be asleep. Your healthcare provider may make a noise near your abdomen to try to wake your baby. The test usually takes about 20 minutes, but can take longer if your baby needs to be awakened.        What do I need to know about the test results?  Your baby will be expected to move at least 2 times for a certain amount of time. Your baby's heart rate will be expected to go up by a certain number of beats per minute during movement. If your baby does not move as expected, the test may need to be repeated or you may need other tests.  CARE AGREEMENT:   You have the right to help plan your care. Learn about your health condition and how it may be treated. Discuss treatment options with your healthcare providers to decide what care you want to receive. You always have the right to refuse treatment. The above information is an  only. It is not intended as  medical advice for individual conditions or treatments. Talk to your doctor, nurse or pharmacist before following any medical regimen to see if it is safe and effective for you.  © 2023 Information is for End User's use only and may not be sold, redistributed or otherwise used for commercial purposes. Thank you for choosing us for your  care today.  If you have any questions about your ultrasound or care, please do not hesitate to contact us or your primary obstetrician.        Some general instructions for your pregnancy are:    Exercise: Aim for 150 minutes per week of regular exercise.  Walking is great!  Nutrition: Choose healthy sources of calcium, iron, and protein.  Avoid ultraprocessed foods and added sugar.  Learn about Preeclampsia: preeclampsia is a common, potentially serious high blood pressure complication in pregnancy.  A blood pressure of 140mmHg (systolic or top number) or 90mmHg (diastolic or bottom number) should be evaluated by your doctor.  Aspirin is sometimes prescribed in early pregnancy to prevent preeclampsia in women with risk factors - ask your obstetrician if you should be on this medication.  For more resources, visit:  https://www.highriskpregnancyinfo.org/preeclampsia  If you smoke, please try to quit completely but also try to reduce your smoking by as much as possible (as soon as possible).  Do not vape.  Please also avoid cannabis products.  Other warning signs to watch out for in pregnancy or postpartum: chest pain, obstructed breathing or shortness of breath, seizures, thoughts of hurting yourself or your baby, bleeding, a painful or swollen leg, fever, or headache (see [facility] POST-BIRTH Warning Signs campaign).  If these happen call 911.  Itching is also not normal in pregnancy and if you experience this, especially over your hands and feet, potentially worse at night, notify your doctors.

## 2025-05-15 ENCOUNTER — ROUTINE PRENATAL (OUTPATIENT)
Dept: OBGYN CLINIC | Facility: CLINIC | Age: 40
End: 2025-05-15
Payer: MEDICARE

## 2025-05-15 ENCOUNTER — TELEPHONE (OUTPATIENT)
Dept: OBGYN CLINIC | Facility: CLINIC | Age: 40
End: 2025-05-15

## 2025-05-15 VITALS
DIASTOLIC BLOOD PRESSURE: 70 MMHG | WEIGHT: 207 LBS | HEIGHT: 64 IN | SYSTOLIC BLOOD PRESSURE: 130 MMHG | BODY MASS INDEX: 35.34 KG/M2

## 2025-05-15 DIAGNOSIS — O13.3 GESTATIONAL HYPERTENSION, THIRD TRIMESTER: ICD-10-CM

## 2025-05-15 DIAGNOSIS — Z34.03 ENCOUNTER FOR SUPERVISION OF NORMAL FIRST PREGNANCY IN THIRD TRIMESTER: Primary | ICD-10-CM

## 2025-05-15 DIAGNOSIS — O09.513 PRIMIGRAVIDA OF ADVANCED MATERNAL AGE IN THIRD TRIMESTER: ICD-10-CM

## 2025-05-15 DIAGNOSIS — Z3A.34 34 WEEKS GESTATION OF PREGNANCY: ICD-10-CM

## 2025-05-15 PROCEDURE — 99212 OFFICE O/P EST SF 10 MIN: CPT | Performed by: STUDENT IN AN ORGANIZED HEALTH CARE EDUCATION/TRAINING PROGRAM

## 2025-05-15 NOTE — PROGRESS NOTES
34 weeks gestation of pregnancy  38 yo here for 34+3 wk ob visit. G1. No contractions, leaking or bleeding. Good fetal movement. GBS negative. Return in 1 wk.    Gestational hypertension  IoL at 37 wks  Continue weekly labs, BP checks and NST

## 2025-05-15 NOTE — ASSESSMENT & PLAN NOTE
40 yo here for 34+3 wk ob visit. G1. No contractions, leaking or bleeding. Good fetal movement. GBS negative. Return in 1 wk.

## 2025-05-16 ENCOUNTER — ROUTINE PRENATAL (OUTPATIENT)
Facility: HOSPITAL | Age: 40
End: 2025-05-16
Payer: MEDICARE

## 2025-05-16 VITALS
HEIGHT: 64 IN | BODY MASS INDEX: 35.44 KG/M2 | HEART RATE: 80 BPM | DIASTOLIC BLOOD PRESSURE: 92 MMHG | WEIGHT: 207.6 LBS | SYSTOLIC BLOOD PRESSURE: 140 MMHG

## 2025-05-16 DIAGNOSIS — O13.3 GESTATIONAL HYPERTENSION, THIRD TRIMESTER: ICD-10-CM

## 2025-05-16 DIAGNOSIS — Z3A.34 34 WEEKS GESTATION OF PREGNANCY: Primary | ICD-10-CM

## 2025-05-16 PROCEDURE — 59025 FETAL NON-STRESS TEST: CPT | Performed by: OBSTETRICS & GYNECOLOGY

## 2025-05-16 NOTE — LETTER
"   Date: 2025    Kandace Dunaway MD  [address]  [address]  [place] [address]    Patient: Reddy Garcia   YOB: 1985   Date of Visit: 2025   Gestational age 34w4d   Nature of this communication: Routine though please note gHTN with mild range Bps today and no symptoms. Ongoing twice weekly antepartum surveillance is scheduled.        This patient was seen recently in our  office.  Please see ultrasound report under \"OB Procedures\" tab.  Please don't hesitate to contact our office with any concerns or questions.      Sincerely,      Padmini Molina MD  Attending Physician, Maternal-Fetal Medicine  [facility]    "

## 2025-05-16 NOTE — PROGRESS NOTES
Repeat Non-Stress Testing:    Patient verbalizes +FM. Pt denies ALL:               Leaking of fluid   Contractions   Vaginal bleeding   Decreased fetal movement  B/p 140/92. Denies HA, RUQ pain, visual changes. Patient states she checks BP at home QAM. BP's 130's/70-80's. Pre-e s/s reviewed with patient and advised when to call OB. Patient stated understanding.    Patient is performing daily kick counts. Patient has no questions or concerns.   NST strip reviewed by Dr. Molina in-person.

## 2025-05-20 ENCOUNTER — ROUTINE PRENATAL (OUTPATIENT)
Dept: OBGYN CLINIC | Facility: CLINIC | Age: 40
End: 2025-05-20
Payer: MEDICARE

## 2025-05-20 ENCOUNTER — ROUTINE PRENATAL (OUTPATIENT)
Facility: HOSPITAL | Age: 40
End: 2025-05-20
Attending: OBSTETRICS & GYNECOLOGY
Payer: MEDICARE

## 2025-05-20 VITALS — WEIGHT: 211.2 LBS | SYSTOLIC BLOOD PRESSURE: 142 MMHG | BODY MASS INDEX: 36.25 KG/M2 | DIASTOLIC BLOOD PRESSURE: 80 MMHG

## 2025-05-20 VITALS
WEIGHT: 213.2 LBS | HEIGHT: 64 IN | BODY MASS INDEX: 36.4 KG/M2 | DIASTOLIC BLOOD PRESSURE: 86 MMHG | HEART RATE: 76 BPM | SYSTOLIC BLOOD PRESSURE: 134 MMHG

## 2025-05-20 DIAGNOSIS — O26.893 RH NEGATIVE STATE IN ANTEPARTUM PERIOD, THIRD TRIMESTER: ICD-10-CM

## 2025-05-20 DIAGNOSIS — Z3A.35 35 WEEKS GESTATION OF PREGNANCY: ICD-10-CM

## 2025-05-20 DIAGNOSIS — Z34.03 ENCOUNTER FOR SUPERVISION OF NORMAL FIRST PREGNANCY IN THIRD TRIMESTER: ICD-10-CM

## 2025-05-20 DIAGNOSIS — Z3A.35 35 WEEKS GESTATION OF PREGNANCY: Primary | ICD-10-CM

## 2025-05-20 DIAGNOSIS — O13.3 GESTATIONAL HYPERTENSION, THIRD TRIMESTER: Primary | ICD-10-CM

## 2025-05-20 DIAGNOSIS — Z67.91 RH NEGATIVE STATE IN ANTEPARTUM PERIOD, THIRD TRIMESTER: ICD-10-CM

## 2025-05-20 DIAGNOSIS — O13.3 GESTATIONAL HYPERTENSION, THIRD TRIMESTER: ICD-10-CM

## 2025-05-20 LAB
SL AMB  POCT GLUCOSE, UA: NEGATIVE
SL AMB POCT URINE PROTEIN: NEGATIVE

## 2025-05-20 PROCEDURE — 59025 FETAL NON-STRESS TEST: CPT | Performed by: STUDENT IN AN ORGANIZED HEALTH CARE EDUCATION/TRAINING PROGRAM

## 2025-05-20 PROCEDURE — 81002 URINALYSIS NONAUTO W/O SCOPE: CPT | Performed by: OBSTETRICS & GYNECOLOGY

## 2025-05-20 PROCEDURE — 99213 OFFICE O/P EST LOW 20 MIN: CPT | Performed by: OBSTETRICS & GYNECOLOGY

## 2025-05-20 NOTE — PROGRESS NOTES
Repeat Non-Stress Testing:    Patient verbalizes +FM. Pt denies ALL:               Leaking of fluid   Contractions   Vaginal bleeding   Decreased fetal movement    Patient is performing daily kick counts. Patient has no questions or concerns.   NST strip reviewed by Dr. Maldonado in-person.

## 2025-05-20 NOTE — ASSESSMENT & PLAN NOTE
S/p Tdap vaccine.  GBS negative 5/9/25.  Has bags packed and car seat installed.  Pre-eclampsia and labor precautions reviewed.

## 2025-05-20 NOTE — ASSESSMENT & PLAN NOTE
BPs at home 130 to low 140's over 80's to 91.    Denies HA, changes in vision, RUQ/epigastric pain.  LE edema but stable.  Continue weekly BP check here.   Encouraged patient to get labs done today or tomorrow.  Continue NST/HAYDEE with MFM 2x/wk.   IOL on 6/2.

## 2025-05-20 NOTE — PROGRESS NOTES
Visit conducted in Croatian.     Prenatal visit @ 35 1/7wks. Denies ctxs, VB, LOF.  Good FM.  LE edema present and painful at times but denies HA, vision changes, RUQ/epigastric pain.      Problem List Items Addressed This Visit       35 weeks gestation of pregnancy    S/p Tdap vaccine.  GBS negative 5/9/25.  Has bags packed and car seat installed.  Pre-eclampsia and labor precautions reviewed.          Gestational hypertension    BPs at home 130 to low 140's over 80's to 91.    Denies HA, changes in vision, RUQ/epigastric pain.  LE edema but stable.  Continue weekly BP check here.   Encouraged patient to get labs done today or tomorrow.  Continue NST/HAYDEE with MFM 2x/wk.   IOL on 6/2.          Rh negative state in antepartum period, third trimester    S/p Rhogam at 28 weeks on 4/4/25.  Follow up baby's blood type to determine if repeat dose needed postpartum.           Other Visit Diagnoses         Encounter for supervision of normal first pregnancy in third trimester    -  Primary

## 2025-05-20 NOTE — ASSESSMENT & PLAN NOTE
S/p Rhogam at 28 weeks on 4/4/25.  Follow up baby's blood type to determine if repeat dose needed postpartum.

## 2025-05-22 NOTE — PATIENT INSTRUCTIONS
Thank you for choosing us for your  care today.  If you have any questions about your ultrasound or care, please do not hesitate to contact us or your primary obstetrician.        Some general instructions for your pregnancy are:    Exercise: Aim for 150 minutes per week of regular exercise.  Walking is great!  Nutrition: Choose healthy sources of calcium, iron, and protein.  Avoid ultraprocessed foods and added sugar.  Learn about Preeclampsia: preeclampsia is a common, potentially serious high blood pressure complication in pregnancy.  A blood pressure of 140mmHg (systolic or top number) or 90mmHg (diastolic or bottom number) should be evaluated by your doctor.  Aspirin is sometimes prescribed in early pregnancy to prevent preeclampsia in women with risk factors - ask your obstetrician if you should be on this medication.  For more resources, visit:  https://www.highriskpregnancyinfo.org/[email or web address]  If you smoke, please try to quit completely but also try to reduce your smoking by as much as possible (as soon as possible).  Do not vape.  Please also avoid cannabis products.  Other warning signs to watch out for in pregnancy or postpartum: chest pain, obstructed breathing or shortness of breath, seizures, thoughts of hurting yourself or your baby, bleeding, a painful or swollen leg, fever, or headache (see [facility]).  If these happen call 911.  Itching is also not normal in pregnancy and if you experience this, especially over your hands and feet, potentially worse at night, notify your doctors.     Kick Counts in Pregnancy   AMBULATORY CARE:   Kick counts  measure how much your baby is moving in your womb. A kick from your baby can be felt as a twist, turn, swish, roll, or jab. It is common to feel your baby kicking at 26 to 28 weeks of pregnancy. You may feel your baby kick as early as 20 weeks of pregnancy. You may want to start counting at 28 weeks.   Contact your  doctor immediately if:   You feel a change in the number of kicks or movements of your baby.      You feel fewer than 10 kicks within 2 hours.      You have questions or concerns about your baby's movements.     Why measure kick counts:  Your baby's movement may provide information about your baby's health. He or she may move less, or not at all, if there are problems. Your baby may move less if he or she is not getting enough oxygen or nutrition from the placenta. Do not smoke while you are pregnant. Smoking decreases the amount of oxygen that gets to your baby. Talk to your healthcare provider if you need help to quit smoking. Tell your healthcare provider as soon as you feel a change in your baby's movements.  When to measure kick counts:   Measure kick counts at the same time every day.       Measure kick counts when your baby is awake and most active. Your baby may be most active in the evening.     How to measure kick counts:  Check that your baby is awake before you measure kick counts. You can wake up your baby by lightly pushing on your belly, walking, or drinking something cold. Your healthcare provider may tell you different ways to measure kick counts. You may be told to do the following:  Use a chart or clock to keep track of the time you start and finish counting.      Sit in a chair or lie on your left side.      Place your hands on the largest part of your belly.      Count until you reach 10 kicks. Write down how much time it takes to count 10 kicks.      It may take 30 minutes to 2 hours to count 10 kicks. It should not take more than 2 hours to count 10 kicks.     Follow up with your doctor as directed:  Write down your questions so you remember to ask them during your visits.   © Copyright [employer] 2023 Information is for End User's use only and may not be sold, redistributed or otherwise used for commercial purposes.  The above information is an  only. It is not intended as  medical advice for individual conditions or treatments. Talk to your doctor, nurse or pharmacist before following any medical regimen to see if it is safe and effective for you. Nonstress Test for Pregnancy   WHAT YOU NEED TO KNOW:   What do I need to know about a nonstress test?  A nonstress test measures your baby's heart rate and movements. Nonstress means that no stress will be placed on your baby during the test.  How do I prepare for a nonstress test?  Your healthcare provider will talk to you about how to prepare for this test. Your provider may tell you to eat and drink plenty of liquids before your test. If you smoke, you may be asked not to smoke within 2 hours before the test. Your provider will also tell you which medicines to take or not take on the day of your test.  What will happen during a nonstress test?  You may be asked to lie down or recline back for the test on a bed. One or 2 belts with sensors will be placed around your abdomen. Your baby's heart rate will be recorded with a machine. If your baby does not move, your baby may be asleep. Your healthcare provider may make a noise near your abdomen to try to wake your baby. The test usually takes about 20 minutes, but can take longer if your baby needs to be awakened.        What do I need to know about the test results?  Your baby will be expected to move at least 2 times for a certain amount of time. Your baby's heart rate will be expected to go up by a certain number of beats per minute during movement. If your baby does not move as expected, the test may need to be repeated or you may need other tests.  CARE AGREEMENT:   You have the right to help plan your care. Learn about your health condition and how it may be treated. Discuss treatment options with your healthcare providers to decide what care you want to receive. You always have the right to refuse treatment. The above information is an  only. It is not intended as  medical advice for individual conditions or treatments. Talk to your doctor, nurse or pharmacist before following any medical regimen to see if it is safe and effective for you.  © 2023 Information is for End User's use only and may not be sold, redistributed or otherwise used for commercial purposes. Thank you for choosing us for your  care today.  If you have any questions about your ultrasound or care, please do not hesitate to contact us or your primary obstetrician.        Some general instructions for your pregnancy are:    Exercise: Aim for 150 minutes per week of regular exercise.  Walking is great!  Nutrition: Choose healthy sources of calcium, iron, and protein.  Avoid ultraprocessed foods and added sugar.  Learn about Preeclampsia: preeclampsia is a common, potentially serious high blood pressure complication in pregnancy.  A blood pressure of 140mmHg (systolic or top number) or 90mmHg (diastolic or bottom number) should be evaluated by your doctor.  Aspirin is sometimes prescribed in early pregnancy to prevent preeclampsia in women with risk factors - ask your obstetrician if you should be on this medication.  For more resources, visit:  https://www.highriskpregnancyinfo.org/preeclampsia  If you smoke, please try to quit completely but also try to reduce your smoking by as much as possible (as soon as possible).  Do not vape.  Please also avoid cannabis products.  Other warning signs to watch out for in pregnancy or postpartum: chest pain, obstructed breathing or shortness of breath, seizures, thoughts of hurting yourself or your baby, bleeding, a painful or swollen leg, fever, or headache (see [facility] POST-BIRTH Warning Signs campaign).  If these happen call 911.  Itching is also not normal in pregnancy and if you experience this, especially over your hands and feet, potentially worse at night, notify your doctors.

## 2025-05-23 ENCOUNTER — APPOINTMENT (OUTPATIENT)
Dept: LAB | Facility: CLINIC | Age: 40
End: 2025-05-23
Payer: MEDICARE

## 2025-05-23 ENCOUNTER — TELEPHONE (OUTPATIENT)
Dept: OTHER | Facility: OTHER | Age: 40
End: 2025-05-23

## 2025-05-23 ENCOUNTER — ULTRASOUND (OUTPATIENT)
Dept: PERINATAL CARE | Facility: CLINIC | Age: 40
End: 2025-05-23
Payer: MEDICARE

## 2025-05-23 ENCOUNTER — ANCILLARY PROCEDURE (OUTPATIENT)
Dept: PERINATAL CARE | Facility: CLINIC | Age: 40
End: 2025-05-23
Attending: STUDENT IN AN ORGANIZED HEALTH CARE EDUCATION/TRAINING PROGRAM
Payer: MEDICARE

## 2025-05-23 VITALS
SYSTOLIC BLOOD PRESSURE: 140 MMHG | HEART RATE: 87 BPM | DIASTOLIC BLOOD PRESSURE: 78 MMHG | HEIGHT: 64 IN | BODY MASS INDEX: 35.96 KG/M2 | WEIGHT: 210.6 LBS

## 2025-05-23 DIAGNOSIS — Z3A.35 35 WEEKS GESTATION OF PREGNANCY: Primary | ICD-10-CM

## 2025-05-23 DIAGNOSIS — O13.3 GESTATIONAL HYPERTENSION, THIRD TRIMESTER: ICD-10-CM

## 2025-05-23 LAB
ALBUMIN SERPL BCG-MCNC: 3.4 G/DL (ref 3.5–5)
ALP SERPL-CCNC: 107 U/L (ref 34–104)
ALT SERPL W P-5'-P-CCNC: 17 U/L (ref 7–52)
ANION GAP SERPL CALCULATED.3IONS-SCNC: 7 MMOL/L (ref 4–13)
AST SERPL W P-5'-P-CCNC: 17 U/L (ref 13–39)
BILIRUB SERPL-MCNC: 0.28 MG/DL (ref 0.2–1)
BUN SERPL-MCNC: 14 MG/DL (ref 5–25)
CALCIUM ALBUM COR SERPL-MCNC: 9.3 MG/DL (ref 8.3–10.1)
CALCIUM SERPL-MCNC: 8.8 MG/DL (ref 8.4–10.2)
CHLORIDE SERPL-SCNC: 103 MMOL/L (ref 96–108)
CO2 SERPL-SCNC: 24 MMOL/L (ref 21–32)
CREAT SERPL-MCNC: 0.72 MG/DL (ref 0.6–1.3)
CREAT UR-MCNC: 178.6 MG/DL
ERYTHROCYTE [DISTWIDTH] IN BLOOD BY AUTOMATED COUNT: 13.2 % (ref 11.6–15.1)
GFR SERPL CREATININE-BSD FRML MDRD: 105 ML/MIN/1.73SQ M
GLUCOSE P FAST SERPL-MCNC: 71 MG/DL (ref 65–99)
HCT VFR BLD AUTO: 41 % (ref 34.8–46.1)
HGB BLD-MCNC: 13.5 G/DL (ref 11.5–15.4)
MCH RBC QN AUTO: 29.9 PG (ref 26.8–34.3)
MCHC RBC AUTO-ENTMCNC: 32.9 G/DL (ref 31.4–37.4)
MCV RBC AUTO: 91 FL (ref 82–98)
PLATELET # BLD AUTO: 202 THOUSANDS/UL (ref 149–390)
PMV BLD AUTO: 11.5 FL (ref 8.9–12.7)
POTASSIUM SERPL-SCNC: 4.1 MMOL/L (ref 3.5–5.3)
PROT SERPL-MCNC: 6.6 G/DL (ref 6.4–8.4)
PROT UR-MCNC: 18.6 MG/DL
PROT/CREAT UR: 0.1 MG/G{CREAT}
RBC # BLD AUTO: 4.52 MILLION/UL (ref 3.81–5.12)
SODIUM SERPL-SCNC: 134 MMOL/L (ref 135–147)
WBC # BLD AUTO: 11 THOUSAND/UL (ref 4.31–10.16)

## 2025-05-23 PROCEDURE — 59025 FETAL NON-STRESS TEST: CPT | Performed by: OBSTETRICS & GYNECOLOGY

## 2025-05-23 PROCEDURE — 36415 COLL VENOUS BLD VENIPUNCTURE: CPT

## 2025-05-23 PROCEDURE — 82570 ASSAY OF URINE CREATININE: CPT

## 2025-05-23 PROCEDURE — 85027 COMPLETE CBC AUTOMATED: CPT

## 2025-05-23 PROCEDURE — 84156 ASSAY OF PROTEIN URINE: CPT

## 2025-05-23 PROCEDURE — 80053 COMPREHEN METABOLIC PANEL: CPT

## 2025-05-23 PROCEDURE — 76815 OB US LIMITED FETUS(S): CPT

## 2025-05-23 NOTE — PROGRESS NOTES
This patient received  care under my supervision at [facility].  NST is reactive.  -Eugenie Ribeiro MD

## 2025-05-23 NOTE — PROGRESS NOTES
Repeat Non-Stress Testing:    Patient verbalizes +FM. Pt denies ALL:               Leaking of fluid   Contractions   Vaginal bleeding   Decreased fetal movement    Patient is performing daily kick counts. Patient has no questions or concerns.   NST strip reviewed by Dr. Ribeiro in-person.    Denies any s/s of PIH. Labor precautions given to patient. Patient verbalized understanding

## 2025-05-23 NOTE — TELEPHONE ENCOUNTER
"Patient stated, \"I would like my NST/HAYDEE appointment that's on 25 to be changed to the [place] location. The address is:    [facility], [address], [place] [address] [phone]     This is where I went before.\"    Please call patient when office opens to review request and reschedule. Thank you.  "

## 2025-05-27 ENCOUNTER — TELEPHONE (OUTPATIENT)
Dept: OTHER | Facility: OTHER | Age: 40
End: 2025-05-27

## 2025-05-27 PROBLEM — Z3A.36 36 WEEKS GESTATION OF PREGNANCY: Status: ACTIVE | Noted: 2024-12-12

## 2025-05-28 NOTE — TELEPHONE ENCOUNTER
"Patient stated, \"Can the ultrasound be added to my appointment on 5/30/25 at the [place] location? I had my appointment scheduled on 5/28/25  for NST/HAYDEE at [facility], but the location of preference is [place].\"    Please contact patient. Thank you.   "

## 2025-05-28 NOTE — TELEPHONE ENCOUNTER
Patient called back inquiring on her last message. I tried to contact the office but no answer. Please follow up with the patient     # [phone]  Portuguese speaking patient

## 2025-05-29 ENCOUNTER — ROUTINE PRENATAL (OUTPATIENT)
Dept: OBGYN CLINIC | Facility: CLINIC | Age: 40
End: 2025-05-29

## 2025-05-29 ENCOUNTER — HOSPITAL ENCOUNTER (OUTPATIENT)
Facility: HOSPITAL | Age: 40
Discharge: HOME | End: 2025-05-29
Attending: STUDENT IN AN ORGANIZED HEALTH CARE EDUCATION/TRAINING PROGRAM | Admitting: STUDENT IN AN ORGANIZED HEALTH CARE EDUCATION/TRAINING PROGRAM
Payer: MEDICARE

## 2025-05-29 VITALS
TEMPERATURE: 98.5 F | SYSTOLIC BLOOD PRESSURE: 139 MMHG | OXYGEN SATURATION: 96 % | RESPIRATION RATE: 18 BRPM | DIASTOLIC BLOOD PRESSURE: 85 MMHG | HEART RATE: 71 BPM

## 2025-05-29 VITALS
WEIGHT: 214 LBS | DIASTOLIC BLOOD PRESSURE: 118 MMHG | HEART RATE: 86 BPM | BODY MASS INDEX: 36.73 KG/M2 | SYSTOLIC BLOOD PRESSURE: 150 MMHG | OXYGEN SATURATION: 97 %

## 2025-05-29 DIAGNOSIS — Z34.03 ENCOUNTER FOR SUPERVISION OF NORMAL FIRST PREGNANCY IN THIRD TRIMESTER: Primary | ICD-10-CM

## 2025-05-29 DIAGNOSIS — O09.513 PRIMIGRAVIDA OF ADVANCED MATERNAL AGE IN THIRD TRIMESTER: ICD-10-CM

## 2025-05-29 DIAGNOSIS — O26.893 RH NEGATIVE STATE IN ANTEPARTUM PERIOD, THIRD TRIMESTER: ICD-10-CM

## 2025-05-29 DIAGNOSIS — Z3A.36 36 WEEKS GESTATION OF PREGNANCY: ICD-10-CM

## 2025-05-29 DIAGNOSIS — O13.3 GESTATIONAL HYPERTENSION, THIRD TRIMESTER: ICD-10-CM

## 2025-05-29 DIAGNOSIS — Z3A.36 36 WEEKS GESTATION OF PREGNANCY: Primary | ICD-10-CM

## 2025-05-29 DIAGNOSIS — Z67.91 RH NEGATIVE STATE IN ANTEPARTUM PERIOD, THIRD TRIMESTER: ICD-10-CM

## 2025-05-29 LAB
ALBUMIN SERPL BCG-MCNC: 3.4 G/DL (ref 3.5–5)
ALP SERPL-CCNC: 120 U/L (ref 34–104)
ALT SERPL W P-5'-P-CCNC: 18 U/L (ref 7–52)
ANION GAP SERPL CALCULATED.3IONS-SCNC: 7 MMOL/L (ref 4–13)
AST SERPL W P-5'-P-CCNC: 21 U/L (ref 13–39)
BASOPHILS # BLD AUTO: 0.04 THOUSANDS/ÂΜL (ref 0–0.1)
BASOPHILS NFR BLD AUTO: 0 % (ref 0–1)
BILIRUB SERPL-MCNC: 0.23 MG/DL (ref 0.2–1)
BUN SERPL-MCNC: 17 MG/DL (ref 5–25)
CALCIUM ALBUM COR SERPL-MCNC: 9.8 MG/DL (ref 8.3–10.1)
CALCIUM SERPL-MCNC: 9.3 MG/DL (ref 8.4–10.2)
CHLORIDE SERPL-SCNC: 103 MMOL/L (ref 96–108)
CO2 SERPL-SCNC: 23 MMOL/L (ref 21–32)
CREAT SERPL-MCNC: 0.71 MG/DL (ref 0.6–1.3)
CREAT UR-MCNC: 133.7 MG/DL
EOSINOPHIL # BLD AUTO: 0.11 THOUSAND/ÂΜL (ref 0–0.61)
EOSINOPHIL NFR BLD AUTO: 1 % (ref 0–6)
ERYTHROCYTE [DISTWIDTH] IN BLOOD BY AUTOMATED COUNT: 13.4 % (ref 11.6–15.1)
GFR SERPL CREATININE-BSD FRML MDRD: 107 ML/MIN/1.73SQ M
GLUCOSE SERPL-MCNC: 82 MG/DL (ref 65–140)
HCT VFR BLD AUTO: 39.7 % (ref 34.8–46.1)
HGB BLD-MCNC: 13.1 G/DL (ref 11.5–15.4)
IMM GRANULOCYTES # BLD AUTO: 0.05 THOUSAND/UL (ref 0–0.2)
IMM GRANULOCYTES NFR BLD AUTO: 1 % (ref 0–2)
LYMPHOCYTES # BLD AUTO: 1.95 THOUSANDS/ÂΜL (ref 0.6–4.47)
LYMPHOCYTES NFR BLD AUTO: 21 % (ref 14–44)
MCH RBC QN AUTO: 29.8 PG (ref 26.8–34.3)
MCHC RBC AUTO-ENTMCNC: 33 G/DL (ref 31.4–37.4)
MCV RBC AUTO: 90 FL (ref 82–98)
MONOCYTES # BLD AUTO: 0.59 THOUSAND/ÂΜL (ref 0.17–1.22)
MONOCYTES NFR BLD AUTO: 6 % (ref 4–12)
NEUTROPHILS # BLD AUTO: 6.55 THOUSANDS/ÂΜL (ref 1.85–7.62)
NEUTS SEG NFR BLD AUTO: 71 % (ref 43–75)
NRBC BLD AUTO-RTO: 0 /100 WBCS
PLATELET # BLD AUTO: 178 THOUSANDS/UL (ref 149–390)
PMV BLD AUTO: 11.9 FL (ref 8.9–12.7)
POTASSIUM SERPL-SCNC: 4.5 MMOL/L (ref 3.5–5.3)
PROT SERPL-MCNC: 6.7 G/DL (ref 6.4–8.4)
PROT UR-MCNC: 17.5 MG/DL
PROT/CREAT UR: 0.1 MG/G{CREAT}
RBC # BLD AUTO: 4.39 MILLION/UL (ref 3.81–5.12)
SL AMB  POCT GLUCOSE, UA: NORMAL
SL AMB POCT URINE PROTEIN: NORMAL
SODIUM SERPL-SCNC: 133 MMOL/L (ref 135–147)
WBC # BLD AUTO: 9.29 THOUSAND/UL (ref 4.31–10.16)

## 2025-05-29 PROCEDURE — NC001 PR NO CHARGE: Performed by: STUDENT IN AN ORGANIZED HEALTH CARE EDUCATION/TRAINING PROGRAM

## 2025-05-29 PROCEDURE — 80053 COMPREHEN METABOLIC PANEL: CPT

## 2025-05-29 PROCEDURE — 84156 ASSAY OF PROTEIN URINE: CPT

## 2025-05-29 PROCEDURE — 85025 COMPLETE CBC W/AUTO DIFF WBC: CPT

## 2025-05-29 PROCEDURE — 99213 OFFICE O/P EST LOW 20 MIN: CPT

## 2025-05-29 PROCEDURE — 82570 ASSAY OF URINE CREATININE: CPT

## 2025-05-29 NOTE — PROGRESS NOTES
36 weeks gestation of pregnancy  41 yo G1 at 36+3 here for routine ob visit. Had a HA this morning that resolved but felt dizzy and a little unwell at the time. No contractions, leaking or bleeding. Good fetal movement. Return for PP visit- aware she will need BP check pp but waiting to schedule until after triage visit today.    Gestational hypertension  Severe range on arrival. Repeat was mild range. Due for labs tomorrow. Given BP and HA today recommend labs today at triage. If all checks okay has delivery set up at 37 wks, would need cervical check to confirm she needs ripening while in triage. GBS previously collected and negative.

## 2025-05-29 NOTE — PATIENT INSTRUCTIONS
Kick Counts in Pregnancy   AMBULATORY CARE:   Kick counts  measure how much your baby is moving in your womb. A kick from your baby can be felt as a twist, turn, swish, roll, or jab. It is common to feel your baby kicking at 26 to 28 weeks of pregnancy. You may feel your baby kick as early as 20 weeks of pregnancy. You may want to start counting at 28 weeks.   Contact your doctor immediately if:   You feel a change in the number of kicks or movements of your baby.      You feel fewer than 10 kicks within 2 hours.      You have questions or concerns about your baby's movements.     Why measure kick counts:  Your baby's movement may provide information about your baby's health. He or she may move less, or not at all, if there are problems. Your baby may move less if he or she is not getting enough oxygen or nutrition from the placenta. Do not smoke while you are pregnant. Smoking decreases the amount of oxygen that gets to your baby. Talk to your healthcare provider if you need help to quit smoking. Tell your healthcare provider as soon as you feel a change in your baby's movements.  When to measure kick counts:   Measure kick counts at the same time every day.       Measure kick counts when your baby is awake and most active. Your baby may be most active in the evening.     How to measure kick counts:  Check that your baby is awake before you measure kick counts. You can wake up your baby by lightly pushing on your belly, walking, or drinking something cold. Your healthcare provider may tell you different ways to measure kick counts. You may be told to do the following:  Use a chart or clock to keep track of the time you start and finish counting.      Sit in a chair or lie on your left side.      Place your hands on the largest part of your belly.      Count until you reach 10 kicks. Write down how much time it takes to count 10 kicks.      It may take 30 minutes to 2 hours to count 10 kicks. It should not take more  than 2 hours to count 10 kicks.     Follow up with your doctor as directed:  Write down your questions so you remember to ask them during your visits.   © Copyright [employer] 2023 Information is for End User's use only and may not be sold, redistributed or otherwise used for commercial purposes.  The above information is an  only. It is not intended as medical advice for individual conditions or treatments. Talk to your doctor, nurse or pharmacist before following any medical regimen to see if it is safe and effective for you. Nonstress Test for Pregnancy   WHAT YOU NEED TO KNOW:   What do I need to know about a nonstress test?  A nonstress test measures your baby's heart rate and movements. Nonstress means that no stress will be placed on your baby during the test.  How do I prepare for a nonstress test?  Your healthcare provider will talk to you about how to prepare for this test. Your provider may tell you to eat and drink plenty of liquids before your test. If you smoke, you may be asked not to smoke within 2 hours before the test. Your provider will also tell you which medicines to take or not take on the day of your test.  What will happen during a nonstress test?  You may be asked to lie down or recline back for the test on a bed. One or 2 belts with sensors will be placed around your abdomen. Your baby's heart rate will be recorded with a machine. If your baby does not move, your baby may be asleep. Your healthcare provider may make a noise near your abdomen to try to wake your baby. The test usually takes about 20 minutes, but can take longer if your baby needs to be awakened.        What do I need to know about the test results?  Your baby will be expected to move at least 2 times for a certain amount of time. Your baby's heart rate will be expected to go up by a certain number of beats per minute during movement. If your baby does not move as expected, the test may need to be repeated or you  may need other tests.  CARE AGREEMENT:   You have the right to help plan your care. Learn about your health condition and how it may be treated. Discuss treatment options with your healthcare providers to decide what care you want to receive. You always have the right to refuse treatment. The above information is an  only. It is not intended as medical advice for individual conditions or treatments. Talk to your doctor, nurse or pharmacist before following any medical regimen to see if it is safe and effective for you.  © 2023 Information is for End User's use only and may not be sold, redistributed or otherwise used for commercial purposes. Thank you for choosing us for your  care today.  If you have any questions about your ultrasound or care, please do not hesitate to contact us or your primary obstetrician.        Some general instructions for your pregnancy are:    Exercise: Aim for 150 minutes per week of regular exercise.  Walking is great!  Nutrition: Choose healthy sources of calcium, iron, and protein.  Avoid ultraprocessed foods and added sugar.  Learn about Preeclampsia: preeclampsia is a common, potentially serious high blood pressure complication in pregnancy.  A blood pressure of 140mmHg (systolic or top number) or 90mmHg (diastolic or bottom number) should be evaluated by your doctor.  Aspirin is sometimes prescribed in early pregnancy to prevent preeclampsia in women with risk factors - ask your obstetrician if you should be on this medication.  For more resources, visit:  https://[email or web address].org/preeclampsia  If you smoke, please try to quit completely but also try to reduce your smoking by as much as possible (as soon as possible).  Do not vape.  Please also avoid cannabis products.  Other warning signs to watch out for in pregnancy or postpartum: chest pain, obstructed breathing or shortness of breath, seizures, thoughts of hurting yourself  or your baby, bleeding, a painful or swollen leg, fever, or headache (see [facility] POST-BIRTH Warning Signs campaign).  If these happen call 911.  Itching is also not normal in pregnancy and if you experience this, especially over your hands and feet, potentially worse at night, notify your doctors.     Nonstress Test for Pregnancy   WHAT YOU NEED TO KNOW:   What do I need to know about a nonstress test?  A nonstress test measures your baby's heart rate and movements. Nonstress means that no stress will be placed on your baby during the test.  How do I prepare for a nonstress test?  Your healthcare provider will talk to you about how to prepare for this test. Your provider may tell you to eat and drink plenty of liquids before your test. If you smoke, you may be asked not to smoke within 2 hours before the test. Your provider will also tell you which medicines to take or not take on the day of your test.  What will happen during a nonstress test?  You may be asked to lie down or recline back for the test on a bed. One or 2 belts with sensors will be placed around your abdomen. Your baby's heart rate will be recorded with a machine. If your baby does not move, your baby may be asleep. Your healthcare provider may make a noise near your abdomen to try to wake your baby. The test usually takes about 20 minutes, but can take longer if your baby needs to be awakened.        What do I need to know about the test results?  Your baby will be expected to move at least 2 times for a certain amount of time. Your baby's heart rate will be expected to go up by a certain number of beats per minute during movement. If your baby does not move as expected, the test may need to be repeated or you may need other tests.  CARE AGREEMENT:   You have the right to help plan your care. Learn about your health condition and how it may be treated. Discuss treatment options with your healthcare providers to decide what care you want to receive.  You always have the right to refuse treatment. The above information is an  only. It is not intended as medical advice for individual conditions or treatments. Talk to your doctor, nurse or pharmacist before following any medical regimen to see if it is safe and effective for you.  © Copyright [employer] 2023 Information is for End User's use only and may not be sold, redistributed or otherwise used for commercial purposes. Kick Counts in Pregnancy   AMBULATORY CARE:   Kick counts  measure how much your baby is moving in your womb. A kick from your baby can be felt as a twist, turn, swish, roll, or jab. It is common to feel your baby kicking at 26 to 28 weeks of pregnancy. You may feel your baby kick as early as 20 weeks of pregnancy. You may want to start counting at 28 weeks.   Contact your doctor immediately if:   You feel a change in the number of kicks or movements of your baby.      You feel fewer than 10 kicks within 2 hours.      You have questions or concerns about your baby's movements.     Why measure kick counts:  Your baby's movement may provide information about your baby's health. He or she may move less, or not at all, if there are problems. Your baby may move less if he or she is not getting enough oxygen or nutrition from the placenta. Do not smoke while you are pregnant. Smoking decreases the amount of oxygen that gets to your baby. Talk to your healthcare provider if you need help to quit smoking. Tell your healthcare provider as soon as you feel a change in your baby's movements.  When to measure kick counts:   Measure kick counts at the same time every day.       Measure kick counts when your baby is awake and most active. Your baby may be most active in the evening.     How to measure kick counts:  Check that your baby is awake before you measure kick counts. You can wake up your baby by lightly pushing on your belly, walking, or drinking something cold. Your healthcare provider  may tell you different ways to measure kick counts. You may be told to do the following:  Use a chart or clock to keep track of the time you start and finish counting.      Sit in a chair or lie on your left side.      Place your hands on the largest part of your belly.      Count until you reach 10 kicks. Write down how much time it takes to count 10 kicks.      It may take 30 minutes to 2 hours to count 10 kicks. It should not take more than 2 hours to count 10 kicks.     Follow up with your doctor as directed:  Write down your questions so you remember to ask them during your visits.   © Copyright [employer] 2023 Information is for End User's use only and may not be sold, redistributed or otherwise used for commercial purposes.  The above information is an  only. It is not intended as medical advice for individual conditions or treatments. Talk to your doctor, nurse or pharmacist before following any medical regimen to see if it is safe and effective for you.

## 2025-05-29 NOTE — PROGRESS NOTES
Progress Note - OB/GYN   Name: Reddy Garcia 40 y.o. female I MRN: [id number]  Unit/Bed#: LD TRIAGE 2 I Date of Admission: 2025   Date of Service: 2025 I Hospital Day: 0     Assessment & Plan  Gestational hypertension  CBC/CMP wnl, P:C 0.1  Mostly normotensive in triage with one elevated bp of 141/83  Asymptomatic  36 weeks gestation of pregnancy  NST reactive: 135 bpm baseline, moderate variability, 15x15 accels, no decels  No ctx    Discharge instructions  Patient instructed to call if experiencing worsening contractions, vaginal bleeding, loss of fluid or decreased fetal movement.  Will follow up with OBGYN on 2025.    D/w Dr. Taylor  ______________    SUBJECTIVE    MARIA TERESA: Estimated Date of Delivery: 25    HPI:  40 y.o.  36w3d presents after being seen for her prenatal visit where she had a blood pressure 150/118 and reported overall feeling tired. She is a known gestational hypertensive with a scheduled IOL on . She denies HA, vision changes, CP, SOB, RUQ pain/epigastric pain. She has some LE edema but not any worse than usual.    Contractions: denies  Leakage of fluid: denies  Vaginal Bleeding: denies  Fetal movement: present    Her obstetrical history is significant for gHTN    ROS:  Constitutional: Negative  Respiratory: Negative  Cardiovascular: Negative    Gastrointestinal: Negative    OBJECTIVE:    Vitals:  /85   Pulse 71   Temp 98.5 °F (36.9 °C) (Oral)   Resp 18   LMP 2024 (Exact Date)   SpO2 96%   There is no height or weight on file to calculate BMI.    Physical Exam  Vitals and nursing note reviewed.   Constitutional:       General: She is not in acute distress.     Appearance: She is well-developed.   HENT:      Head: Normocephalic and atraumatic.     Eyes:      Conjunctiva/sclera: Conjunctivae normal.       Cardiovascular:      Rate and Rhythm: Normal rate and regular rhythm.      Heart sounds: No murmur heard.  Pulmonary:      Effort:  Pulmonary effort is normal. No respiratory distress.      Breath sounds: Normal breath sounds.   Abdominal:      Palpations: Abdomen is soft.      Tenderness: There is no abdominal tenderness.     Musculoskeletal:         General: No swelling.      Cervical back: Neck supple.      Right lower leg: Edema present.      Left lower leg: Edema present.     Skin:     General: Skin is warm and dry.      Capillary Refill: Capillary refill takes less than 2 seconds.     Neurological:      Mental Status: She is alert.     Psychiatric:         Mood and Affect: Mood normal.         FHT:  Baseline Rate (FHR): 135 bpm  Variability: Moderate  Accelerations: 15 x 15 or greater, At variable times  Decelerations: None    TOCO:   Contraction Duration (seconds):       Labs:   Recent Results (from the past 24 hours)   POCT urine dip    Collection Time: 05/29/25  2:22 PM   Result Value Ref Range    POCT URINE PROTEIN neg     GLUCOSE, UA neg    CBC and differential    Collection Time: 05/29/25  4:43 PM   Result Value Ref Range    WBC 9.29 4.31 - 10.16 Thousand/uL    RBC 4.39 3.81 - 5.12 Million/uL    Hemoglobin 13.1 11.5 - 15.4 g/dL    Hematocrit 39.7 34.8 - 46.1 %    MCV 90 82 - 98 fL    MCH 29.8 26.8 - 34.3 pg    MCHC 33.0 31.4 - 37.4 g/dL    RDW 13.4 11.6 - 15.1 %    MPV 11.9 8.9 - 12.7 fL    Platelets 178 149 - 390 Thousands/uL    nRBC 0 /100 WBCs    Segmented % 71 43 - 75 %    Immature Grans % 1 0 - 2 %    Lymphocytes % 21 14 - 44 %    Monocytes % 6 4 - 12 %    Eosinophils Relative 1 0 - 6 %    Basophils Relative 0 0 - 1 %    Absolute Neutrophils 6.55 1.85 - 7.62 Thousands/µL    Absolute Immature Grans 0.05 0.00 - 0.20 Thousand/uL    Absolute Lymphocytes 1.95 0.60 - 4.47 Thousands/µL    Absolute Monocytes 0.59 0.17 - 1.22 Thousand/µL    Eosinophils Absolute 0.11 0.00 - 0.61 Thousand/µL    Basophils Absolute 0.04 0.00 - 0.10 Thousands/µL   Comprehensive metabolic panel    Collection Time: 05/29/25  4:43 PM   Result Value Ref  Range    Sodium 133 (L) 135 - 147 mmol/L    Potassium 4.5 3.5 - 5.3 mmol/L    Chloride 103 96 - 108 mmol/L    CO2 23 21 - 32 mmol/L    ANION GAP 7 4 - 13 mmol/L    BUN 17 5 - 25 mg/dL    Creatinine 0.71 0.60 - 1.30 mg/dL    Glucose 82 65 - 140 mg/dL    Calcium 9.3 8.4 - 10.2 mg/dL    Corrected Calcium 9.8 8.3 - 10.1 mg/dL    AST 21 13 - 39 U/L    ALT 18 7 - 52 U/L    Alkaline Phosphatase 120 (H) 34 - 104 U/L    Total Protein 6.7 6.4 - 8.4 g/dL    Albumin 3.4 (L) 3.5 - 5.0 g/dL    Total Bilirubin 0.23 0.20 - 1.00 mg/dL    eGFR 107 ml/min/1.73sq m   Protein / creatinine ratio, urine    Collection Time: 05/29/25  4:43 PM   Result Value Ref Range    Creatinine, Ur 133.7 Reference range not established. mg/dL    Protein Urine Random 17.5 Reference range not established. mg/dL    Prot/Creat Ratio, Ur 0.1 <=0.2           Amy Rodríguez MD  5/29/2025  5:20 PM

## 2025-05-29 NOTE — ASSESSMENT & PLAN NOTE
39 yo G1 at 36+3 here for routine ob visit. Had a HA this morning that resolved but felt dizzy and a little unwell at the time. No contractions, leaking or bleeding. Good fetal movement. Return for PP visit- aware she will need BP check pp but waiting to schedule until after triage visit today.

## 2025-05-29 NOTE — PROGRESS NOTES
Prenatal visit  GA 36w 3d  Denies any vaginal bleeding, Leaking of fluid or pelvic cramping   Normal Fetal movement   28 wk labs completed   S/p Tdap & Rhogam   Delivery consent signed on 04/04/2025  GBS neg 05/09/2025   Took her BP at home today with two elevated readings at 12:00 /96 and 12:45PM 153/108. Denies headaches and vision changes. Reports fatigue.

## 2025-05-29 NOTE — ASSESSMENT & PLAN NOTE
Severe range on arrival. Repeat was mild range. Due for labs tomorrow. Given BP and HA today recommend labs today at triage. If all checks okay has delivery set up at 37 wks, would need cervical check to confirm she needs ripening while in triage. GBS previously collected and negative.

## 2025-05-30 ENCOUNTER — ANCILLARY PROCEDURE (OUTPATIENT)
Dept: PERINATAL CARE | Facility: OTHER | Age: 40
End: 2025-05-30
Attending: STUDENT IN AN ORGANIZED HEALTH CARE EDUCATION/TRAINING PROGRAM
Payer: MEDICARE

## 2025-05-30 ENCOUNTER — ULTRASOUND (OUTPATIENT)
Dept: PERINATAL CARE | Facility: OTHER | Age: 40
End: 2025-05-30
Payer: MEDICARE

## 2025-05-30 VITALS
DIASTOLIC BLOOD PRESSURE: 88 MMHG | SYSTOLIC BLOOD PRESSURE: 136 MMHG | WEIGHT: 213.2 LBS | HEIGHT: 64 IN | HEART RATE: 78 BPM | BODY MASS INDEX: 36.4 KG/M2

## 2025-05-30 DIAGNOSIS — Z3A.36 36 WEEKS GESTATION OF PREGNANCY: ICD-10-CM

## 2025-05-30 DIAGNOSIS — O13.3 GESTATIONAL HYPERTENSION, THIRD TRIMESTER: Primary | ICD-10-CM

## 2025-05-30 PROCEDURE — 59025 FETAL NON-STRESS TEST: CPT | Performed by: OBSTETRICS & GYNECOLOGY

## 2025-05-30 PROCEDURE — 99214 OFFICE O/P EST MOD 30 MIN: CPT | Performed by: OBSTETRICS & GYNECOLOGY

## 2025-05-30 PROCEDURE — 76815 OB US LIMITED FETUS(S): CPT | Performed by: OBSTETRICS & GYNECOLOGY

## 2025-05-30 NOTE — ASSESSMENT & PLAN NOTE
Reviewed signs/symptoms of pre-E as well as BP threshold to notify OB if present. She is scheduled for delivery 6/2/25, which is clinically appropriate. I reviewed workup from yesterday including normal CBC, CMP and UP/Cr, suggestive against preE. Discussed daily fetal kick counting. No MFM follow-up indicated at this time given delivery is planned in 3 days.

## 2025-05-30 NOTE — PROGRESS NOTES
Repeat Non-Stress Testing:    Patient verbalizes +FM. Pt denies ALL:               Leaking of fluid   Contractions   Vaginal bleeding   Decreased fetal movement  Denies headache, blurred vision, spots, and right epigastric pain    Patient is performing daily kick counts. Patient has no questions or concerns.   NST strip reviewed by Dr. Molina in-person.

## 2025-05-30 NOTE — LETTER
"   Date: 2025    Malena Box MD  [address] [facility]  [address]  [place] [address]    Patient: Reddy Garcia   YOB: 1985   Date of Visit: 2025   Gestational age 36w4d   Nature of this communication: Routine       This patient was seen recently in our  office.  Please see ultrasound report under \"Imaging\" tab.  Please don't hesitate to contact our office with any concerns or questions.      Sincerely,      Padmini Molina MD  Attending Physician, Maternal-Fetal Medicine  [facility]    "

## 2025-05-30 NOTE — PROGRESS NOTES
"[facility]: Reddy Garcia was seen today for NST (found under the pregnancy episode) which I reviewed the RN assessment and agree, and HAYDEE (see ultrasound report under Imaging tab). See ultrasound report under \"Imaging\" tab.     HPI: No OB complaints today, feels active fetal movement. Was evaluated in L&D yesterday after BP at home was 150/118. Denies signs/symptoms of pre-E today. Bps at home since triage eval have been lower.  Vitals:    25 0902   BP: 136/88   Pulse: 78     Problem List Items Addressed This Visit          Cardiovascular and Mediastinum    Gestational hypertension - Primary    Reviewed signs/symptoms of pre-E as well as BP threshold to notify OB if present. She is scheduled for delivery 25, which is clinically appropriate. I reviewed workup from yesterday including normal CBC, CMP and UP/Cr, suggestive against preE. Discussed daily fetal kick counting. No MFM follow-up indicated at this time given delivery is planned in 3 days.            Obstetrics/Gynecology    36 weeks gestation of pregnancy   Evaluation/Management:  I. Diagnoses/Problems addressed:  Acute uncomplicated illness/injury: gHTN (ex. GDMA1, MO, UTI)  II.  Data: I reviewed CBC, CMP, UPCr from triage yesterday  lab tests ordered by another provider.  III.  Risk of morbidity: Moderate    Please don't hesitate to contact our office with any concerns or questions.    -Padmini Molina MD  "

## 2025-06-01 ENCOUNTER — NURSE TRIAGE (OUTPATIENT)
Dept: OTHER | Facility: OTHER | Age: 40
End: 2025-06-01

## 2025-06-01 ENCOUNTER — HOSPITAL ENCOUNTER (INPATIENT)
Facility: HOSPITAL | Age: 40
LOS: 4 days | Discharge: HOME | End: 2025-06-05
Attending: OBSTETRICS & GYNECOLOGY | Admitting: OBSTETRICS & GYNECOLOGY
Payer: MEDICARE

## 2025-06-01 DIAGNOSIS — Z3A.36 36 WEEKS GESTATION OF PREGNANCY: Primary | ICD-10-CM

## 2025-06-01 DIAGNOSIS — O14.10 SEVERE PREECLAMPSIA: ICD-10-CM

## 2025-06-01 PROBLEM — Z34.90 ENCOUNTER FOR INDUCTION OF LABOR: Status: ACTIVE | Noted: 2025-06-01

## 2025-06-01 LAB
ABO GROUP BLD: NORMAL
ALBUMIN SERPL BCG-MCNC: 3.3 G/DL (ref 3.5–5)
ALP SERPL-CCNC: 124 U/L (ref 34–104)
ALT SERPL W P-5'-P-CCNC: 17 U/L (ref 7–52)
ANION GAP SERPL CALCULATED.3IONS-SCNC: 9 MMOL/L (ref 4–13)
AST SERPL W P-5'-P-CCNC: 18 U/L (ref 13–39)
BASOPHILS # BLD AUTO: 0.06 THOUSANDS/ÂΜL (ref 0–0.1)
BASOPHILS NFR BLD AUTO: 1 % (ref 0–1)
BILIRUB SERPL-MCNC: 0.19 MG/DL (ref 0.2–1)
BLD GP AB SCN SERPL QL: POSITIVE
BLOOD GROUP ANTIBODIES SERPL: NORMAL
BUN SERPL-MCNC: 17 MG/DL (ref 5–25)
CALCIUM ALBUM COR SERPL-MCNC: 9.4 MG/DL (ref 8.3–10.1)
CALCIUM SERPL-MCNC: 8.8 MG/DL (ref 8.4–10.2)
CHLORIDE SERPL-SCNC: 106 MMOL/L (ref 96–108)
CO2 SERPL-SCNC: 21 MMOL/L (ref 21–32)
CREAT SERPL-MCNC: 0.67 MG/DL (ref 0.6–1.3)
CREAT UR-MCNC: 96.3 MG/DL
EOSINOPHIL # BLD AUTO: 0.1 THOUSAND/ÂΜL (ref 0–0.61)
EOSINOPHIL NFR BLD AUTO: 1 % (ref 0–6)
ERYTHROCYTE [DISTWIDTH] IN BLOOD BY AUTOMATED COUNT: 13.5 % (ref 11.6–15.1)
GFR SERPL CREATININE-BSD FRML MDRD: 111 ML/MIN/1.73SQ M
GLUCOSE SERPL-MCNC: 117 MG/DL (ref 65–140)
HCT VFR BLD AUTO: 38.8 % (ref 34.8–46.1)
HGB BLD-MCNC: 13.1 G/DL (ref 11.5–15.4)
IMM GRANULOCYTES # BLD AUTO: 0.05 THOUSAND/UL (ref 0–0.2)
IMM GRANULOCYTES NFR BLD AUTO: 1 % (ref 0–2)
LYMPHOCYTES # BLD AUTO: 2.2 THOUSANDS/ÂΜL (ref 0.6–4.47)
LYMPHOCYTES NFR BLD AUTO: 22 % (ref 14–44)
MCH RBC QN AUTO: 30 PG (ref 26.8–34.3)
MCHC RBC AUTO-ENTMCNC: 33.8 G/DL (ref 31.4–37.4)
MCV RBC AUTO: 89 FL (ref 82–98)
MONOCYTES # BLD AUTO: 0.68 THOUSAND/ÂΜL (ref 0.17–1.22)
MONOCYTES NFR BLD AUTO: 7 % (ref 4–12)
NEUTROPHILS # BLD AUTO: 6.88 THOUSANDS/ÂΜL (ref 1.85–7.62)
NEUTS SEG NFR BLD AUTO: 68 % (ref 43–75)
NRBC BLD AUTO-RTO: 0 /100 WBCS
PLATELET # BLD AUTO: 169 THOUSANDS/UL (ref 149–390)
PMV BLD AUTO: 11.9 FL (ref 8.9–12.7)
POTASSIUM SERPL-SCNC: 3.9 MMOL/L (ref 3.5–5.3)
PROT SERPL-MCNC: 6.5 G/DL (ref 6.4–8.4)
PROT UR-MCNC: 17.9 MG/DL
PROT/CREAT UR: 0.2 MG/G{CREAT}
RBC # BLD AUTO: 4.37 MILLION/UL (ref 3.81–5.12)
RH BLD: NEGATIVE
SODIUM SERPL-SCNC: 136 MMOL/L (ref 135–147)
SPECIMEN EXPIRATION DATE: NORMAL
WBC # BLD AUTO: 9.97 THOUSAND/UL (ref 4.31–10.16)

## 2025-06-01 PROCEDURE — NC001 PR NO CHARGE: Performed by: OBSTETRICS & GYNECOLOGY

## 2025-06-01 PROCEDURE — 86780 TREPONEMA PALLIDUM: CPT

## 2025-06-01 PROCEDURE — 86900 BLOOD TYPING SEROLOGIC ABO: CPT

## 2025-06-01 PROCEDURE — 80053 COMPREHEN METABOLIC PANEL: CPT

## 2025-06-01 PROCEDURE — 86901 BLOOD TYPING SEROLOGIC RH(D): CPT

## 2025-06-01 PROCEDURE — 84156 ASSAY OF PROTEIN URINE: CPT

## 2025-06-01 PROCEDURE — 86850 RBC ANTIBODY SCREEN: CPT

## 2025-06-01 PROCEDURE — 85025 COMPLETE CBC W/AUTO DIFF WBC: CPT

## 2025-06-01 PROCEDURE — 82570 ASSAY OF URINE CREATININE: CPT

## 2025-06-01 PROCEDURE — 99214 OFFICE O/P EST MOD 30 MIN: CPT

## 2025-06-01 PROCEDURE — 86870 RBC ANTIBODY IDENTIFICATION: CPT

## 2025-06-01 RX ORDER — ONDANSETRON 2 MG/ML
4 INJECTION INTRAMUSCULAR; INTRAVENOUS EVERY 6 HOURS PRN
Status: DISCONTINUED | OUTPATIENT
Start: 2025-06-01 | End: 2025-06-03

## 2025-06-01 RX ORDER — MAGNESIUM SULFATE HEPTAHYDRATE 40 MG/ML
1 INJECTION, SOLUTION INTRAVENOUS CONTINUOUS
Status: DISCONTINUED | OUTPATIENT
Start: 2025-06-01 | End: 2025-06-03

## 2025-06-01 RX ORDER — BUPIVACAINE HYDROCHLORIDE 2.5 MG/ML
30 INJECTION, SOLUTION EPIDURAL; INFILTRATION; INTRACAUDAL; PERINEURAL ONCE AS NEEDED
Status: DISCONTINUED | OUTPATIENT
Start: 2025-06-01 | End: 2025-06-03

## 2025-06-01 RX ORDER — LABETALOL HYDROCHLORIDE 5 MG/ML
INJECTION, SOLUTION INTRAVENOUS
Status: COMPLETED
Start: 2025-06-01 | End: 2025-06-01

## 2025-06-01 RX ORDER — LABETALOL HYDROCHLORIDE 5 MG/ML
20 INJECTION, SOLUTION INTRAVENOUS ONCE
Status: COMPLETED | OUTPATIENT
Start: 2025-06-01 | End: 2025-06-01

## 2025-06-01 RX ORDER — ACETAMINOPHEN 325 MG/1
650 TABLET ORAL EVERY 6 HOURS PRN
Status: DISCONTINUED | OUTPATIENT
Start: 2025-06-01 | End: 2025-06-03

## 2025-06-01 RX ORDER — SODIUM CHLORIDE, SODIUM LACTATE, POTASSIUM CHLORIDE, CALCIUM CHLORIDE 600; 310; 30; 20 MG/100ML; MG/100ML; MG/100ML; MG/100ML
50 INJECTION, SOLUTION INTRAVENOUS CONTINUOUS
Status: DISCONTINUED | OUTPATIENT
Start: 2025-06-01 | End: 2025-06-02

## 2025-06-01 RX ORDER — CALCIUM GLUCONATE 98 MG/ML
1 INJECTION, SOLUTION INTRAVENOUS ONCE AS NEEDED
Status: DISCONTINUED | OUTPATIENT
Start: 2025-06-01 | End: 2025-06-04

## 2025-06-01 RX ORDER — MAGNESIUM SULFATE HEPTAHYDRATE 40 MG/ML
2 INJECTION, SOLUTION INTRAVENOUS ONCE
Status: COMPLETED | OUTPATIENT
Start: 2025-06-01 | End: 2025-06-01

## 2025-06-01 RX ORDER — MAGNESIUM SULFATE HEPTAHYDRATE 40 MG/ML
4 INJECTION, SOLUTION INTRAVENOUS ONCE
Status: COMPLETED | OUTPATIENT
Start: 2025-06-01 | End: 2025-06-01

## 2025-06-01 RX ADMIN — MORPHINE SULFATE 2 MG: 2 INJECTION, SOLUTION INTRAMUSCULAR; INTRAVENOUS at 23:42

## 2025-06-01 RX ADMIN — Medication 25 MCG: at 23:55

## 2025-06-01 RX ADMIN — LABETALOL HYDROCHLORIDE 20 MG: 5 INJECTION, SOLUTION INTRAVENOUS at 20:13

## 2025-06-01 RX ADMIN — MAGNESIUM SULFATE HEPTAHYDRATE 2 G: 40 INJECTION, SOLUTION INTRAVENOUS at 20:55

## 2025-06-01 RX ADMIN — MAGNESIUM SULFATE HEPTAHYDRATE 2 G/HR: 40 INJECTION, SOLUTION INTRAVENOUS at 21:07

## 2025-06-01 RX ADMIN — SODIUM CHLORIDE, SODIUM LACTATE, POTASSIUM CHLORIDE, AND CALCIUM CHLORIDE 50 ML/HR: .6; .31; .03; .02 INJECTION, SOLUTION INTRAVENOUS at 20:30

## 2025-06-01 RX ADMIN — MAGNESIUM SULFATE HEPTAHYDRATE 4 G: 40 INJECTION, SOLUTION INTRAVENOUS at 20:28

## 2025-06-01 NOTE — TELEPHONE ENCOUNTER
Called patient back. Pt already at [facility] ED.   Reason for Disposition  • Patient already left for the hospital/clinic.    Protocols used: No Contact or Duplicate Contact Call-Adult-

## 2025-06-02 ENCOUNTER — ANESTHESIA (INPATIENT)
Dept: ANESTHESIOLOGY | Facility: HOSPITAL | Age: 40
End: 2025-06-02
Payer: MEDICARE

## 2025-06-02 ENCOUNTER — ANESTHESIA EVENT (INPATIENT)
Dept: ANESTHESIOLOGY | Facility: HOSPITAL | Age: 40
End: 2025-06-02
Payer: MEDICARE

## 2025-06-02 LAB
ALBUMIN SERPL BCG-MCNC: 3.3 G/DL (ref 3.5–5)
ALBUMIN SERPL BCG-MCNC: 3.4 G/DL (ref 3.5–5)
ALBUMIN SERPL BCG-MCNC: 3.6 G/DL (ref 3.5–5)
ALBUMIN SERPL BCG-MCNC: 3.7 G/DL (ref 3.5–5)
ALP SERPL-CCNC: 116 U/L (ref 34–104)
ALP SERPL-CCNC: 122 U/L (ref 34–104)
ALP SERPL-CCNC: 126 U/L (ref 34–104)
ALP SERPL-CCNC: 127 U/L (ref 34–104)
ALT SERPL W P-5'-P-CCNC: 17 U/L (ref 7–52)
ALT SERPL W P-5'-P-CCNC: 18 U/L (ref 7–52)
ALT SERPL W P-5'-P-CCNC: 19 U/L (ref 7–52)
ALT SERPL W P-5'-P-CCNC: 20 U/L (ref 7–52)
ANION GAP SERPL CALCULATED.3IONS-SCNC: 10 MMOL/L (ref 4–13)
ANION GAP SERPL CALCULATED.3IONS-SCNC: 12 MMOL/L (ref 4–13)
ANION GAP SERPL CALCULATED.3IONS-SCNC: 9 MMOL/L (ref 4–13)
ANION GAP SERPL CALCULATED.3IONS-SCNC: 9 MMOL/L (ref 4–13)
AST SERPL W P-5'-P-CCNC: 17 U/L (ref 13–39)
AST SERPL W P-5'-P-CCNC: 18 U/L (ref 13–39)
AST SERPL W P-5'-P-CCNC: 21 U/L (ref 13–39)
AST SERPL W P-5'-P-CCNC: 21 U/L (ref 13–39)
BILIRUB SERPL-MCNC: 0.18 MG/DL (ref 0.2–1)
BILIRUB SERPL-MCNC: 0.2 MG/DL (ref 0.2–1)
BILIRUB SERPL-MCNC: 0.23 MG/DL (ref 0.2–1)
BILIRUB SERPL-MCNC: 0.23 MG/DL (ref 0.2–1)
BUN SERPL-MCNC: 11 MG/DL (ref 5–25)
BUN SERPL-MCNC: 12 MG/DL (ref 5–25)
BUN SERPL-MCNC: 13 MG/DL (ref 5–25)
BUN SERPL-MCNC: 15 MG/DL (ref 5–25)
CALCIUM ALBUM COR SERPL-MCNC: 8.3 MG/DL (ref 8.3–10.1)
CALCIUM ALBUM COR SERPL-MCNC: 8.7 MG/DL (ref 8.3–10.1)
CALCIUM SERPL-MCNC: 7.2 MG/DL (ref 8.4–10.2)
CALCIUM SERPL-MCNC: 7.5 MG/DL (ref 8.4–10.2)
CALCIUM SERPL-MCNC: 7.8 MG/DL (ref 8.4–10.2)
CALCIUM SERPL-MCNC: 8.1 MG/DL (ref 8.4–10.2)
CHLORIDE SERPL-SCNC: 100 MMOL/L (ref 96–108)
CHLORIDE SERPL-SCNC: 101 MMOL/L (ref 96–108)
CHLORIDE SERPL-SCNC: 104 MMOL/L (ref 96–108)
CHLORIDE SERPL-SCNC: 98 MMOL/L (ref 96–108)
CO2 SERPL-SCNC: 20 MMOL/L (ref 21–32)
CO2 SERPL-SCNC: 20 MMOL/L (ref 21–32)
CO2 SERPL-SCNC: 21 MMOL/L (ref 21–32)
CO2 SERPL-SCNC: 22 MMOL/L (ref 21–32)
CREAT SERPL-MCNC: 0.63 MG/DL (ref 0.6–1.3)
CREAT SERPL-MCNC: 0.67 MG/DL (ref 0.6–1.3)
CREAT SERPL-MCNC: 0.71 MG/DL (ref 0.6–1.3)
CREAT SERPL-MCNC: 0.78 MG/DL (ref 0.6–1.3)
ERYTHROCYTE [DISTWIDTH] IN BLOOD BY AUTOMATED COUNT: 13.6 % (ref 11.6–15.1)
ERYTHROCYTE [DISTWIDTH] IN BLOOD BY AUTOMATED COUNT: 13.8 % (ref 11.6–15.1)
GFR SERPL CREATININE-BSD FRML MDRD: 107 ML/MIN/1.73SQ M
GFR SERPL CREATININE-BSD FRML MDRD: 111 ML/MIN/1.73SQ M
GFR SERPL CREATININE-BSD FRML MDRD: 113 ML/MIN/1.73SQ M
GFR SERPL CREATININE-BSD FRML MDRD: 96 ML/MIN/1.73SQ M
GLUCOSE SERPL-MCNC: 116 MG/DL (ref 65–140)
GLUCOSE SERPL-MCNC: 86 MG/DL (ref 65–140)
GLUCOSE SERPL-MCNC: 86 MG/DL (ref 65–140)
GLUCOSE SERPL-MCNC: 88 MG/DL (ref 65–140)
HCT VFR BLD AUTO: 38.4 % (ref 34.8–46.1)
HCT VFR BLD AUTO: 39.5 % (ref 34.8–46.1)
HCT VFR BLD AUTO: 41.4 % (ref 34.8–46.1)
HCT VFR BLD AUTO: 41.7 % (ref 34.8–46.1)
HGB BLD-MCNC: 12.7 G/DL (ref 11.5–15.4)
HGB BLD-MCNC: 12.8 G/DL (ref 11.5–15.4)
HGB BLD-MCNC: 13.7 G/DL (ref 11.5–15.4)
HGB BLD-MCNC: 13.9 G/DL (ref 11.5–15.4)
MAGNESIUM SERPL-MCNC: 5 MG/DL (ref 1.9–2.7)
MAGNESIUM SERPL-MCNC: 6.2 MG/DL (ref 1.9–2.7)
MAGNESIUM SERPL-MCNC: 6.4 MG/DL (ref 1.9–2.7)
MAGNESIUM SERPL-MCNC: 7 MG/DL (ref 1.9–2.7)
MCH RBC QN AUTO: 29.6 PG (ref 26.8–34.3)
MCH RBC QN AUTO: 29.9 PG (ref 26.8–34.3)
MCH RBC QN AUTO: 30 PG (ref 26.8–34.3)
MCH RBC QN AUTO: 30.3 PG (ref 26.8–34.3)
MCHC RBC AUTO-ENTMCNC: 32.4 G/DL (ref 31.4–37.4)
MCHC RBC AUTO-ENTMCNC: 32.9 G/DL (ref 31.4–37.4)
MCHC RBC AUTO-ENTMCNC: 33.1 G/DL (ref 31.4–37.4)
MCHC RBC AUTO-ENTMCNC: 33.6 G/DL (ref 31.4–37.4)
MCV RBC AUTO: 90 FL (ref 82–98)
MCV RBC AUTO: 90 FL (ref 82–98)
MCV RBC AUTO: 91 FL (ref 82–98)
MCV RBC AUTO: 92 FL (ref 82–98)
PLATELET # BLD AUTO: 163 THOUSANDS/UL (ref 149–390)
PLATELET # BLD AUTO: 168 THOUSANDS/UL (ref 149–390)
PLATELET # BLD AUTO: 176 THOUSANDS/UL (ref 149–390)
PLATELET # BLD AUTO: 178 THOUSANDS/UL (ref 149–390)
PMV BLD AUTO: 11.4 FL (ref 8.9–12.7)
PMV BLD AUTO: 11.7 FL (ref 8.9–12.7)
PMV BLD AUTO: 11.8 FL (ref 8.9–12.7)
PMV BLD AUTO: 11.9 FL (ref 8.9–12.7)
POTASSIUM SERPL-SCNC: 4.2 MMOL/L (ref 3.5–5.3)
POTASSIUM SERPL-SCNC: 4.2 MMOL/L (ref 3.5–5.3)
POTASSIUM SERPL-SCNC: 4.4 MMOL/L (ref 3.5–5.3)
POTASSIUM SERPL-SCNC: 4.6 MMOL/L (ref 3.5–5.3)
PROT SERPL-MCNC: 6.4 G/DL (ref 6.4–8.4)
PROT SERPL-MCNC: 6.5 G/DL (ref 6.4–8.4)
PROT SERPL-MCNC: 7 G/DL (ref 6.4–8.4)
PROT SERPL-MCNC: 7 G/DL (ref 6.4–8.4)
RBC # BLD AUTO: 4.28 MILLION/UL (ref 3.81–5.12)
RBC # BLD AUTO: 4.29 MILLION/UL (ref 3.81–5.12)
RBC # BLD AUTO: 4.56 MILLION/UL (ref 3.81–5.12)
RBC # BLD AUTO: 4.58 MILLION/UL (ref 3.81–5.12)
SODIUM SERPL-SCNC: 130 MMOL/L (ref 135–147)
SODIUM SERPL-SCNC: 131 MMOL/L (ref 135–147)
SODIUM SERPL-SCNC: 132 MMOL/L (ref 135–147)
SODIUM SERPL-SCNC: 133 MMOL/L (ref 135–147)
TREPONEMA PALLIDUM IGG+IGM AB [PRESENCE] IN SERUM OR PLASMA BY IMMUNOASSAY: NORMAL
WBC # BLD AUTO: 10.47 THOUSAND/UL (ref 4.31–10.16)
WBC # BLD AUTO: 11.83 THOUSAND/UL (ref 4.31–10.16)
WBC # BLD AUTO: 11.94 THOUSAND/UL (ref 4.31–10.16)
WBC # BLD AUTO: 12.66 THOUSAND/UL (ref 4.31–10.16)

## 2025-06-02 PROCEDURE — 3E0P7VZ INTRODUCTION OF HORMONE INTO FEMALE REPRODUCTIVE, VIA NATURAL OR ARTIFICIAL OPENING: ICD-10-PCS | Performed by: STUDENT IN AN ORGANIZED HEALTH CARE EDUCATION/TRAINING PROGRAM

## 2025-06-02 PROCEDURE — 10907ZC DRAINAGE OF AMNIOTIC FLUID, THERAPEUTIC FROM PRODUCTS OF CONCEPTION, VIA NATURAL OR ARTIFICIAL OPENING: ICD-10-PCS | Performed by: STUDENT IN AN ORGANIZED HEALTH CARE EDUCATION/TRAINING PROGRAM

## 2025-06-02 PROCEDURE — 83735 ASSAY OF MAGNESIUM: CPT | Performed by: OBSTETRICS & GYNECOLOGY

## 2025-06-02 PROCEDURE — 80053 COMPREHEN METABOLIC PANEL: CPT

## 2025-06-02 PROCEDURE — 85027 COMPLETE CBC AUTOMATED: CPT

## 2025-06-02 PROCEDURE — 82805 BLOOD GASES W/O2 SATURATION: CPT | Performed by: OBSTETRICS & GYNECOLOGY

## 2025-06-02 PROCEDURE — 0KQM0ZZ REPAIR PERINEUM MUSCLE, OPEN APPROACH: ICD-10-PCS | Performed by: STUDENT IN AN ORGANIZED HEALTH CARE EDUCATION/TRAINING PROGRAM

## 2025-06-02 PROCEDURE — 10H07YZ INSERTION OF OTHER DEVICE INTO PRODUCTS OF CONCEPTION, VIA NATURAL OR ARTIFICIAL OPENING: ICD-10-PCS | Performed by: STUDENT IN AN ORGANIZED HEALTH CARE EDUCATION/TRAINING PROGRAM

## 2025-06-02 PROCEDURE — 4A1HXCZ MONITORING OF PRODUCTS OF CONCEPTION, CARDIAC RATE, EXTERNAL APPROACH: ICD-10-PCS | Performed by: STUDENT IN AN ORGANIZED HEALTH CARE EDUCATION/TRAINING PROGRAM

## 2025-06-02 PROCEDURE — 83735 ASSAY OF MAGNESIUM: CPT

## 2025-06-02 RX ORDER — NIFEDIPINE 30 MG/1
30 TABLET, EXTENDED RELEASE ORAL DAILY
Status: DISCONTINUED | OUTPATIENT
Start: 2025-06-03 | End: 2025-06-02

## 2025-06-02 RX ORDER — ACETAMINOPHEN 325 MG/1
975 TABLET ORAL ONCE
Status: COMPLETED | OUTPATIENT
Start: 2025-06-02 | End: 2025-06-02

## 2025-06-02 RX ORDER — LIDOCAINE HYDROCHLORIDE AND EPINEPHRINE 15; 5 MG/ML; UG/ML
INJECTION, SOLUTION EPIDURAL
Status: COMPLETED | OUTPATIENT
Start: 2025-06-02 | End: 2025-06-02

## 2025-06-02 RX ORDER — SODIUM CHLORIDE, SODIUM LACTATE, POTASSIUM CHLORIDE, CALCIUM CHLORIDE 600; 310; 30; 20 MG/100ML; MG/100ML; MG/100ML; MG/100ML
75 INJECTION, SOLUTION INTRAVENOUS CONTINUOUS
Status: DISCONTINUED | OUTPATIENT
Start: 2025-06-02 | End: 2025-06-03

## 2025-06-02 RX ORDER — SODIUM CHLORIDE 9 MG/ML
50 INJECTION, SOLUTION INTRAVENOUS CONTINUOUS
Status: DISCONTINUED | OUTPATIENT
Start: 2025-06-02 | End: 2025-06-03

## 2025-06-02 RX ORDER — NIFEDIPINE 30 MG/1
30 TABLET, EXTENDED RELEASE ORAL DAILY
Status: DISCONTINUED | OUTPATIENT
Start: 2025-06-02 | End: 2025-06-05 | Stop reason: HOSPADM

## 2025-06-02 RX ORDER — LABETALOL HYDROCHLORIDE 5 MG/ML
20 INJECTION, SOLUTION INTRAVENOUS ONCE
Status: COMPLETED | OUTPATIENT
Start: 2025-06-02 | End: 2025-06-02

## 2025-06-02 RX ORDER — OXYTOCIN/0.9 % SODIUM CHLORIDE 30/500 ML
1-30 PLASTIC BAG, INJECTION (ML) INTRAVENOUS
Status: DISCONTINUED | OUTPATIENT
Start: 2025-06-02 | End: 2025-06-03

## 2025-06-02 RX ORDER — LABETALOL HYDROCHLORIDE 5 MG/ML
40 INJECTION, SOLUTION INTRAVENOUS ONCE
Status: COMPLETED | OUTPATIENT
Start: 2025-06-02 | End: 2025-06-02

## 2025-06-02 RX ORDER — LABETALOL HYDROCHLORIDE 5 MG/ML
INJECTION, SOLUTION INTRAVENOUS
Status: COMPLETED
Start: 2025-06-02 | End: 2025-06-02

## 2025-06-02 RX ADMIN — LIDOCAINE HYDROCHLORIDE AND EPINEPHRINE 5 ML: 15; 5 INJECTION, SOLUTION EPIDURAL at 19:00

## 2025-06-02 RX ADMIN — Medication 25 MCG: at 09:41

## 2025-06-02 RX ADMIN — LABETALOL HYDROCHLORIDE 20 MG: 5 INJECTION, SOLUTION INTRAVENOUS at 18:29

## 2025-06-02 RX ADMIN — ACETAMINOPHEN 975 MG: 325 TABLET, FILM COATED ORAL at 04:22

## 2025-06-02 RX ADMIN — NIFEDIPINE 30 MG: 30 TABLET, FILM COATED, EXTENDED RELEASE ORAL at 17:02

## 2025-06-02 RX ADMIN — Medication 250 MILLI-UNITS/MIN: at 23:53

## 2025-06-02 RX ADMIN — MAGNESIUM SULFATE HEPTAHYDRATE 2 G/HR: 40 INJECTION, SOLUTION INTRAVENOUS at 07:31

## 2025-06-02 RX ADMIN — ACETAMINOPHEN 650 MG: 325 TABLET ORAL at 15:39

## 2025-06-02 RX ADMIN — Medication 25 MCG: at 04:03

## 2025-06-02 RX ADMIN — SODIUM CHLORIDE, SODIUM LACTATE, POTASSIUM CHLORIDE, AND CALCIUM CHLORIDE 75 ML/HR: .6; .31; .03; .02 INJECTION, SOLUTION INTRAVENOUS at 22:17

## 2025-06-02 RX ADMIN — ONDANSETRON 4 MG: 2 INJECTION INTRAMUSCULAR; INTRAVENOUS at 22:36

## 2025-06-02 RX ADMIN — SODIUM CHLORIDE 50 ML/HR: 0.9 INJECTION, SOLUTION INTRAVENOUS at 16:43

## 2025-06-02 RX ADMIN — SODIUM CHLORIDE, SODIUM LACTATE, POTASSIUM CHLORIDE, AND CALCIUM CHLORIDE 50 ML/HR: .6; .31; .03; .02 INJECTION, SOLUTION INTRAVENOUS at 12:44

## 2025-06-02 RX ADMIN — MORPHINE SULFATE 2 MG: 2 INJECTION, SOLUTION INTRAMUSCULAR; INTRAVENOUS at 09:39

## 2025-06-02 RX ADMIN — LABETALOL HYDROCHLORIDE 40 MG: 5 INJECTION, SOLUTION INTRAVENOUS at 04:15

## 2025-06-02 RX ADMIN — ROPIVACAINE HYDROCHLORIDE: 2 INJECTION, SOLUTION EPIDURAL; INFILTRATION at 19:45

## 2025-06-02 RX ADMIN — MAGNESIUM SULFATE HEPTAHYDRATE 1 G/HR: 40 INJECTION, SOLUTION INTRAVENOUS at 18:49

## 2025-06-02 RX ADMIN — ONDANSETRON 4 MG: 2 INJECTION INTRAMUSCULAR; INTRAVENOUS at 11:03

## 2025-06-02 RX ADMIN — ONDANSETRON 4 MG: 2 INJECTION INTRAMUSCULAR; INTRAVENOUS at 04:13

## 2025-06-02 RX ADMIN — Medication 2 MILLI-UNITS/MIN: at 16:18

## 2025-06-02 NOTE — ASSESSMENT & PLAN NOTE
Recovering well   Routine postpartum care, encourage ambulation, encourage familial bonding  Lactation support for breast/bottle feeding as needed  FEN: Tolerating regular diet  Pain: Motrin/Tylenol around the clock, oxycodone if needed  Appropriate bowel and bladder function

## 2025-06-02 NOTE — PLAN OF CARE
Problem: PAIN - ADULT  Goal: Verbalizes/displays adequate comfort level or baseline comfort level  Description: Interventions:  - Encourage patient to monitor pain and request assistance  - Assess pain using appropriate pain scale  - Administer analgesics as ordered based on type and severity of pain and evaluate response  - Implement non-pharmacological measures as appropriate and evaluate response  - Consider cultural and social influences on pain and pain management  - Notify physician/advanced practitioner if interventions unsuccessful or patient reports new pain  - Educate patient/family on pain management process including their role and importance of  reporting pain   - Provide non-pharmacologic/complimentary pain relief interventions  Outcome: Progressing     Problem: INFECTION - ADULT  Goal: Absence or prevention of progression during hospitalization  Description: INTERVENTIONS:  - Assess and monitor for signs and symptoms of infection  - Monitor lab/diagnostic results  - Monitor all insertion sites, i.e. indwelling lines, tubes, and drains  - Monitor endotracheal if appropriate and nasal secretions for changes in amount and color  - [place] appropriate cooling/warming therapies per order  - Administer medications as ordered  - Instruct and encourage patient and family to use good hand hygiene technique  - Identify and instruct in appropriate isolation precautions for identified infection/condition  Outcome: Progressing  Goal: Absence of fever/infection during neutropenic period  Description: INTERVENTIONS:  - Monitor WBC  - Perform strict hand hygiene  - Limit to healthy visitors only  - No plants, dried, fresh or silk flowers with stephens in patient room  Outcome: Progressing     Problem: SAFETY ADULT  Goal: Patient will remain free of falls  Description: INTERVENTIONS:  - Educate patient/family on patient safety including physical limitations  - Instruct patient to call for assistance with activity   -  Consider consulting OT/PT to assist with strengthening/mobility based on AM PAC & JH-HLM score  - Consult OT/PT to assist with strengthening/mobility   - Keep Call bell within reach  - Keep bed low and locked with side rails adjusted as appropriate  - Keep care items and personal belongings within reach  - Initiate and maintain comfort rounds  - Consider moving patient to room near nurses station  Outcome: Progressing  Goal: Maintain or return to baseline ADL function  Description: INTERVENTIONS:  -  Assess patient's ability to carry out ADLs; assess patient's baseline for ADL function and identify physical deficits which impact ability to perform ADLs (bathing, care of mouth/teeth, toileting, grooming, dressing, etc.)  - Assess/evaluate cause of self-care deficits   - Assess range of motion  - Assess patient's mobility; develop plan if impaired  - Assess patient's need for assistive devices and provide as appropriate  - Encourage maximum independence but intervene and supervise when necessary  - Involve family in performance of ADLs  - Assess for home care needs following discharge   - Consider OT consult to assist with ADL evaluation and planning for discharge  - Provide patient education as appropriate  - Monitor functional capacity and physical performance, use of AM PAC & JH-HLM   - Monitor gait, balance and fatigue with ambulation    Outcome: Progressing  Goal: Maintains/Returns to pre admission functional level  Description: INTERVENTIONS:  - Perform AM-PAC 6 Click Basic Mobility/ Daily Activity assessment daily.  - Set and communicate daily mobility goal to care team and patient/family/caregiver.   - Collaborate with rehabilitation services on mobility goals if consulted  - Out of bed for toileting  - Record patient progress and toleration of activity level   Outcome: Progressing     Problem: DISCHARGE PLANNING  Goal: Discharge to home or other facility with appropriate resources  Description:  INTERVENTIONS:  - Identify barriers to discharge w/patient and caregiver  - Arrange for needed discharge resources and transportation as appropriate  - Identify discharge learning needs (meds, wound care, etc.)  - Arrange for interpretive services to assist at discharge as needed  - Refer to Case Management Department for coordinating discharge planning if the patient needs post-hospital services based on physician/advanced practitioner order or complex needs related to functional status, cognitive ability, or social support system  Outcome: Progressing     Problem: Knowledge Deficit  Goal: Patient/family/caregiver demonstrates understanding of disease process, treatment plan, medications, and discharge instructions  Description: Complete learning assessment and assess knowledge base.  Interventions:  - Provide teaching at level of understanding  - Provide teaching via preferred learning methods  Outcome: Progressing  Goal: Verbalizes understanding of labor plan  Description: Assess patient/family/caregiver's baseline knowledge level and ability to understand information.  Provide education via patient/family/caregiver's preferred learning method at appropriate level of understanding.     1. Provide teaching at level of understanding.  2. Provide teaching via preferred learning method(s).  Outcome: Progressing     Problem: Labor & Delivery  Goal: Manages discomfort  Description: Assess and monitor for signs and symptoms of discomfort.  Assess patient's pain level regularly and per hospital policy.  Administer medications as ordered. Support use of nonpharmacological methods to help control pain such as distraction, imagery, relaxation, and application of heat and cold.  Collaborate with interdisciplinary team and patient to determine appropriate pain management plan.    1. Include patient in decisions related to comfort.  2. Offer non-pharmacological pain management interventions.  3. Report ineffective pain  management to physician.  Outcome: Progressing  Goal: Patient vital signs are stable  Description: 1. Assess vital signs - vaginal delivery.  Outcome: Progressing

## 2025-06-02 NOTE — OB LABOR/OXYTOCIN SAFETY PROGRESS
Labor Progress Note - Reddy Garcia 40 y.o. female MRN: [id number]    Unit/Bed#: -01 Encounter: [id number]       Contraction Frequency (minutes): none  Contraction Intensity: Mild  Uterine Activity Characteristics: Occasional  Cervical Dilation: 3        Cervical Effacement: 70  Fetal Station: -3  Baseline Rate (FHR): 115 bpm  Fetal Heart Rate (FHT): 114 BPM  FHR Category: 1               Vital Signs:   Vitals:    06/02/25 1344   BP: 155/90   Pulse: 68   Resp:    Temp:    SpO2:        Notes/comments:   SVE unchanged, plan to start augmentation with Gurwinder Escobar MD 6/2/2025 3:41 PM

## 2025-06-02 NOTE — QUICK NOTE
Notified by nursing staff of sustained severe range blood pressures @ 1824 hrs      162/90 at 1807 hrs.  171/98 at 1821 hrs      Plan for additional dose of labetalol 20 mg IV at this time. Order placed at 1825. RN aware.     Dr. Taylor aware.      Kevan Matias,   06/02/25  6:27 PM

## 2025-06-02 NOTE — OB LABOR/OXYTOCIN SAFETY PROGRESS
Oxytocin Safety Progress Check Note - Reddy Garcia 40 y.o. female MRN: [id number]    Unit/Bed#: -01 Encounter: [id number]    Dose (ta-units/min) Oxytocin: 6 ta-units/min  Contraction Frequency (minutes): 1.5-4.5  Contraction Intensity: Mild  Uterine Activity Characteristics: Irritability  Cervical Dilation: 3        Cervical Effacement: 70  Fetal Station: -3  Baseline Rate (FHR): 115 bpm  Fetal Heart Rate (FHT): 112 BPM  FHR Category: 1               Vital Signs:   Vitals:    06/02/25 1720   BP: 121/67   Pulse: 66   Resp:    Temp:    SpO2:        Notes/comments:   Plan to decrease magnesium to 1 due to patient discomfort/lethargy.     Ellen Escobar MD 6/2/2025 5:45 PM

## 2025-06-02 NOTE — ANESTHESIA PREPROCEDURE EVALUATION
Procedure:  LABOR ANALGESIA    Relevant Problems   CARDIO   (+) Gestational hypertension   (+) Severe preeclampsia      GYN   (+) 36 weeks gestation of pregnancy      MUSCULOSKELETAL   (+) Back and leg pain in pregnancy      NEURO/PSYCH   (+) Episodic tension-type headache        Physical Exam    Airway     Mallampati score: II  TM Distance: >3 FB  Neck ROM: full  Mouth opening: >= 4 cm      Cardiovascular  Cardiovascular exam normal    Dental   No notable dental hx     Pulmonary  Pulmonary exam normal     Neurological  - normal exam    Other Findings  post-pubertal.      Anesthesia Plan  ASA Score- 2     Anesthesia Type- epidural with ASA Monitors.         Additional Monitors:     Airway Plan: natural airway.           Plan Factors-Exercise tolerance (METS): >4 METS.    Chart reviewed.  Imaging results reviewed. Existing labs reviewed. Patient summary reviewed.    Patient is not a current smoker.              Induction-     Postoperative Plan- .   Monitoring Plan - Monitoring plan - standard ASA monitoring      Perioperative Resuscitation Plan - Level 1 - Full Code.       Informed Consent- Anesthetic plan and risks discussed with patient.  I personally reviewed this patient with the CRNA. Discussed and agreed on the Anesthesia Plan with the CRNA..      NPO Status:  Vitals Value Taken Time   Date of last liquid 06/01/25 06/01/25 20:13   Time of last liquid 2013 06/01/25 20:13   Date of last solid 06/01/25 06/01/25 20:13   Time of last solid 1630 06/01/25 20:13

## 2025-06-02 NOTE — OB LABOR/OXYTOCIN SAFETY PROGRESS
Had surgery today and was advised to call for a fever. Temp was 102 and advil was given Mom states just calling to report what was done. Advised that contact wit surgeon is advised. Call transferred to on call provider .    Reason for Disposition   [1] Fever AND [2] follows MINOR surgery    Protocols used: ST POST-OP SYMPTOMS AND [id number]       Labor Progress Note - Reddy Garcia 40 y.o. female MRN: [id number]    Unit/Bed#:  TRIAGE 4-01 Encounter: [id number]       Contraction Frequency (minutes): 0  Contraction Intensity: Mild  Uterine Activity Characteristics: Irregular  Cervical Dilation: 3        Cervical Effacement: 70  Fetal Station: -3  Baseline Rate (FHR): 115 bpm  Fetal Heart Rate (FHT): 120 BPM  FHR Category: 1               Vital Signs:   Vitals:    06/02/25 0844   BP: 113/72   Pulse: 67   Resp: 18   Temp:    SpO2:        Notes/comments:   Cyto placed, aborted FB due to dilation of 3cm, plan to start pitocin titration if needed when back in labor room     Ellen Escobar MD 6/2/2025 9:50 AM

## 2025-06-02 NOTE — ANESTHESIA PROCEDURE NOTES
Epidural Block    Patient location during procedure: OB/L&D  Start time: 6/2/2025 6:45 PM  Reason for block: procedure for pain  Staffing  Performed by: Bart Benoit MD  Authorized by: Bart Benoit MD    Preanesthetic Checklist  Completed: patient identified, IV checked, site marked, risks and benefits discussed, surgical consent, monitors and equipment checked, pre-op evaluation and timeout performed  Epidural  Patient position: sitting  Prep: ChloraPrep  Sedation Level: no sedation  Approach: midline  Location: lumbar, L3-4  Injection technique: IFTIKHAR saline  Needle  Needle type: Tuohy   Needle gauge: 17 G  Needle insertion depth: 6 cm  Catheter type: spring wound  Catheter size: 19 G  Catheter at skin depth: 2 cm  Catheter securement method: clear occlusive dressing  Test dose: negativelidocaine-epinephrine (XYLOCAINE-MPF/EPINEPHRINE) 1.5 %-1:200,000 injection 3 mL - Epidural   5 mL - 6/2/2025 7:00:00 PM  Assessment  Sensory level: T10  Number of attempts: 1negative aspiration for CSF, negative aspiration for heme and no paresthesia on injection  patient tolerated the procedure well with no immediate complications

## 2025-06-02 NOTE — OB LABOR/OXYTOCIN SAFETY PROGRESS
Labor Progress Note - Reddy Garcia 40 y.o. female MRN: [id number]    Unit/Bed#: LD TRIAGE 4-01 Encounter: [id number]       Contraction Frequency (minutes): occasional  Contraction Intensity: Mild  Uterine Activity Characteristics: Irregular  Cervical Dilation: Fingertip        Cervical Effacement: 50  Fetal Station: Ballotable  Baseline Rate (FHR): 130 bpm  Fetal Heart Rate (FHT): 130 BPM  FHR Category: 1               Vital Signs:   Vitals:    06/01/25 2331   BP: 145/87   Pulse: 68   Resp:    Temp:    SpO2:        Notes/comments:   SVE unchanged. Patient received morphine for pain control prior to toussaint balloon insertion. Toussaint balloon attempted, but was stopped due to patient discomfort, so only vaginal cytotec placed. Dr. Dunaway made aware.       Ramesh Villa MD 6/2/2025 12:20 AM

## 2025-06-02 NOTE — OB LABOR/OXYTOCIN SAFETY PROGRESS
Oxytocin Safety Progress Check Note - Reddy Garcia 40 y.o. female MRN: [id number]    Unit/Bed#: -01 Encounter: [id number]    Dose (ta-units/min) Oxytocin: 6 ta-units/min  Contraction Frequency (minutes): difficult trace  Contraction Intensity: Mild  Uterine Activity Characteristics: Irregular  Cervical Dilation: 5        Cervical Effacement: 80  Fetal Station: -1  Baseline Rate (FHR): 115 bpm  Fetal Heart Rate (FHT): 122 BPM (Simultaneous filing. User may not have seen previous data.)  FHR Category: 2               Vital Signs:   Vitals:    06/02/25 1908   BP:    Pulse: 69   Resp:    Temp:    SpO2:        Notes/comments:   SVE as above per Dr. [place].  FHT Cat 2 with intermittent late decelerations. Plan for repositioning.   Continue with pitocin titration as tolerated.     Kevan Matias,  6/2/2025 7:59 PM

## 2025-06-02 NOTE — OB LABOR/OXYTOCIN SAFETY PROGRESS
Oxytocin Safety Progress Check Note - Reddy Garcia 40 y.o. female MRN: [id number]    Unit/Bed#: -01 Encounter: [id number]    Dose (ta-units/min) Oxytocin: 6 ta-units/min  Contraction Frequency (minutes): 1.5-4.5  Contraction Intensity: Mild  Uterine Activity Characteristics: Irregular  Cervical Dilation: 4        Cervical Effacement: 70  Fetal Station: -2  Baseline Rate (FHR): 120 bpm  Fetal Heart Rate (FHT): 112 BPM  FHR Category: 2               Vital Signs:   Vitals:    06/02/25 1720   BP: 121/67   Pulse: 66   Resp:    Temp:    SpO2:        Notes/comments:   FHT 2 for minimal variability, but appropriate for patient on magnesium. AROM clear, continue to titrate pitocin to contracitons     Emily Taylor MD 6/2/2025 5:56 PM

## 2025-06-02 NOTE — ASSESSMENT & PLAN NOTE
- Originally with gHTN diagnosis; met criteria for preeclampsia with severe features due to the sustained severe range BP's  - Monitor BP's and signs/symptoms of worsening preeclampsia  - Mag decreased from 2->1 held overnight for dizziness, restarted in early AM  - 6/1: Mag @2028, lab 20  - 6/2: lab 40, 20 + Procardia 30mg

## 2025-06-02 NOTE — H&P
H & P- Obstetrics   Reddy Garcia 40 y.o. female MRN: [id number]  Unit/Bed#: LD TRIAGE  Encounter: [id number]    Assessment: 40 y.o.  at 36w6d admitted for preeclampsia with severe features     Plan:   Severe preeclampsia  Assessment & Plan  Originally with gHTN diagnosis; met criteria for preeclampsia with severe features due to the sustained severe range BP's  S/p labetalol  20mg  Magnesium for seizure prophylaxis; q6h CBC/CMP & mag levels, monitor UOP while on mag  CBC/CMP, P/C ordered  Monitor BP's and signs/symptoms of worsening preeclampsia      36 weeks gestation of pregnancy  Assessment & Plan  Admit to OBGYN   Clear liquid diet, IVF LR 125cc/hr   F/u T&S, CBC, RPR   GBS negative; EFW: 63% @32w3d  SVE: 0.5/50/-4  Continuous fetal monitoring and tocometry   Analgesia at maternal request   Vertex by TAUS  Contraception plan: POP's  Plan: FB+cytotec       * Encounter for induction of labor  Assessment & Plan  IOL for PreE w/ SF  Consent signed        Discussed case and plan w/ Dr. Dunaway      Chief Complaint: elevated blood pressures    HPI: Reddy Garcia is a 40 y.o.  with an MARIA TERESA of 2025, by Last Menstrual Period at 36w6d who is being admitted for induction of labor for preeclampsia with severe features. She originally presented to triage due to elevated blood pressures while at home. She had blood pressures ranging from 150-160/'s over the last two days. She denies having uterine contractions, has no LOF, and reports no VB. She states she has felt good FM. She denies headaches, vision changes, chest pain, shortness of breath, or RUQ tenderness.       Problem List[1]    Baby complications/comments: none    Review of Systems   Constitutional:  Negative for chills and fever.   HENT:  Negative for ear pain and sore throat.    Eyes:  Negative for pain and visual disturbance.   Respiratory:  Negative for cough and shortness of breath.    Cardiovascular:  Negative for chest  pain and palpitations.   Gastrointestinal:  Negative for abdominal pain and vomiting.   Genitourinary:  Negative for dysuria and hematuria.   Musculoskeletal:  Negative for arthralgias and back pain.   Skin:  Negative for color change and rash.   Neurological:  Negative for seizures and syncope.   All other systems reviewed and are negative.      OB Hx:  OB History    Para Term  AB Living   1        SAB IAB Ectopic Multiple Live Births             # Outcome Date GA Lbr Georgi/2nd Weight Sex Type Anes PTL Lv   1 Current                Past Medical Hx:  Past Medical History[2]    Past Surgical hx:  Past Surgical History[3]    Social Hx:  Alcohol use: denies  Tobacco use: denies  Other substance use: denies    Allergies[4]      Medications Prior to Admission:     acetaminophen (TYLENOL) 500 mg tablet    Prenatal Vit-Fe Fumarate-FA (PRENATAL PO)    Objective:  Temp:  [97.8 °F (36.6 °C)] 97.8 °F (36.6 °C)  HR:  [68-77] 73  BP: (128-172)/(75-98) 128/75  Resp:  [18] 18  SpO2:  [94 %-97 %] 96 %  Body mass index is 36.6 kg/m².     Physical Exam:  OBGyn Exam       FHT:  Baseline Rate (FHR): 140 bpm  Variability: Moderate  Accelerations: 15 x 15 or greater  Decelerations: None  FHR Category: Category I    TOCO:   Contraction Frequency (minutes): occassional  Contraction Duration (seconds): 60  Contraction Intensity: Mild    Lab Results   Component Value Date    WBC 9.97 2025    HGB 13.1 2025    HCT 38.8 2025     2025     Lab Results   Component Value Date    K 3.9 2025     2025    CO2 21 2025    BUN 17 2025    CREATININE 0.67 2025    AST 18 2025    ALT 17 2025       Prenatal Labs: Reviewed      Blood type: O negative  Antibody: positive  GBS: negative  HIV: Non-reactive  Rubella: Immune  Syphilis IgM/IgG: Non-reactive  HBsAg: Non-reactive  HCAb: Non-reactive  Chlamydia: Negative  Gonorrhea: Negative  Diabetes 1 hour screen: normal   3  hour glucose: not indicated  Platelets: 178k  Hgb: 13.1    >2 Midnights  INPATIENT     Signature/Title: Ramesh Villa MD  Date: 6/1/2025  Time: 9:06 PM         [1]   Patient Active Problem List  Diagnosis    36 weeks gestation of pregnancy    Primigravida of advanced maternal age in third trimester    Episodic tension-type headache    Back and leg pain in pregnancy    Gestational hypertension    Rh negative state in antepartum period, third trimester    Severe preeclampsia    Encounter for induction of labor   [2]   Past Medical History:  Diagnosis Date    Migraine     migraines since teenager   [3]   Past Surgical History:  Procedure Laterality Date    INGUINAL HERNIA REPAIR     [4] No Known Allergies

## 2025-06-02 NOTE — PLAN OF CARE
Problem: PAIN - ADULT  Goal: Verbalizes/displays adequate comfort level or baseline comfort level  Description: Interventions:  - Encourage patient to monitor pain and request assistance  - Assess pain using appropriate pain scale  - Administer analgesics as ordered based on type and severity of pain and evaluate response  - Implement non-pharmacological measures as appropriate and evaluate response  - Consider cultural and social influences on pain and pain management  - Notify physician/advanced practitioner if interventions unsuccessful or patient reports new pain  - Educate patient/family on pain management process including their role and importance of  reporting pain   - Provide non-pharmacologic/complimentary pain relief interventions  Outcome: Progressing     Problem: INFECTION - ADULT  Goal: Absence or prevention of progression during hospitalization  Description: INTERVENTIONS:  - Assess and monitor for signs and symptoms of infection  - Monitor lab/diagnostic results  - Monitor all insertion sites, i.e. indwelling lines, tubes, and drains  - Monitor endotracheal if appropriate and nasal secretions for changes in amount and color  - [place] appropriate cooling/warming therapies per order  - Administer medications as ordered  - Instruct and encourage patient and family to use good hand hygiene technique  - Identify and instruct in appropriate isolation precautions for identified infection/condition  Outcome: Progressing  Goal: Absence of fever/infection during neutropenic period  Description: INTERVENTIONS:  - Monitor WBC  - Perform strict hand hygiene  - Limit to healthy visitors only  - No plants, dried, fresh or silk flowers with stephens in patient room  Outcome: Progressing     Problem: SAFETY ADULT  Goal: Patient will remain free of falls  Description: INTERVENTIONS:  - Educate patient/family on patient safety including physical limitations  - Instruct patient to call for assistance with activity   -  Consider consulting OT/PT to assist with strengthening/mobility based on AM PAC & JH-HLM score  - Consult OT/PT to assist with strengthening/mobility   - Keep Call bell within reach  - Keep bed low and locked with side rails adjusted as appropriate  - Keep care items and personal belongings within reach  - Initiate and maintain comfort rounds  - Make Fall Risk Sign visible to staff  - Apply yellow socks and bracelet for high fall risk patients  - Consider moving patient to room near nurses station  Outcome: Progressing  Goal: Maintain or return to baseline ADL function  Description: INTERVENTIONS:  -  Assess patient's ability to carry out ADLs; assess patient's baseline for ADL function and identify physical deficits which impact ability to perform ADLs (bathing, care of mouth/teeth, toileting, grooming, dressing, etc.)  - Assess/evaluate cause of self-care deficits   - Assess range of motion  - Assess patient's mobility; develop plan if impaired  - Assess patient's need for assistive devices and provide as appropriate  - Encourage maximum independence but intervene and supervise when necessary  - Involve family in performance of ADLs  - Assess for home care needs following discharge   - Consider OT consult to assist with ADL evaluation and planning for discharge  - Provide patient education as appropriate  - Monitor functional capacity and physical performance, use of AM PAC & JH-HLM   - Monitor gait, balance and fatigue with ambulation    Outcome: Progressing  Goal: Maintains/Returns to pre admission functional level  Description: INTERVENTIONS:  - Perform AM-PAC 6 Click Basic Mobility/ Daily Activity assessment daily.  - Set and communicate daily mobility goal to care team and patient/family/caregiver.   - Collaborate with rehabilitation services on mobility goals if consulted  - Out of bed for toileting  - Record patient progress and toleration of activity level   Outcome: Progressing     Problem: DISCHARGE  PLANNING  Goal: Discharge to home or other facility with appropriate resources  Description: INTERVENTIONS:  - Identify barriers to discharge w/patient and caregiver  - Arrange for needed discharge resources and transportation as appropriate  - Identify discharge learning needs (meds, wound care, etc.)  - Arrange for interpretive services to assist at discharge as needed  - Refer to Case Management Department for coordinating discharge planning if the patient needs post-hospital services based on physician/advanced practitioner order or complex needs related to functional status, cognitive ability, or social support system  Outcome: Progressing     Problem: Knowledge Deficit  Goal: Patient/family/caregiver demonstrates understanding of disease process, treatment plan, medications, and discharge instructions  Description: Complete learning assessment and assess knowledge base.  Interventions:  - Provide teaching at level of understanding  - Provide teaching via preferred learning methods  Outcome: Progressing  Goal: Verbalizes understanding of labor plan  Description: Assess patient/family/caregiver's baseline knowledge level and ability to understand information.  Provide education via patient/family/caregiver's preferred learning method at appropriate level of understanding.     1. Provide teaching at level of understanding.  2. Provide teaching via preferred learning method(s).  Outcome: Progressing     Problem: Labor & Delivery  Goal: Manages discomfort  Description: Assess and monitor for signs and symptoms of discomfort.  Assess patient's pain level regularly and per hospital policy.  Administer medications as ordered. Support use of nonpharmacological methods to help control pain such as distraction, imagery, relaxation, and application of heat and cold.  Collaborate with interdisciplinary team and patient to determine appropriate pain management plan.    1. Include patient in decisions related to comfort.  2.  Offer non-pharmacological pain management interventions.  3. Report ineffective pain management to physician.  Outcome: Progressing  Goal: Patient vital signs are stable  Description: 1. Assess vital signs - vaginal delivery.  Outcome: Progressing

## 2025-06-03 PROBLEM — Z34.90 ENCOUNTER FOR INDUCTION OF LABOR: Status: RESOLVED | Noted: 2025-06-01 | Resolved: 2025-06-03

## 2025-06-03 LAB
ABO GROUP BLD: NORMAL
ALBUMIN SERPL BCG-MCNC: 3.2 G/DL (ref 3.5–5)
ALBUMIN SERPL BCG-MCNC: 3.3 G/DL (ref 3.5–5)
ALBUMIN SERPL BCG-MCNC: 3.3 G/DL (ref 3.5–5)
ALBUMIN SERPL BCG-MCNC: 3.4 G/DL (ref 3.5–5)
ALP SERPL-CCNC: 109 U/L (ref 34–104)
ALP SERPL-CCNC: 111 U/L (ref 34–104)
ALP SERPL-CCNC: 114 U/L (ref 34–104)
ALP SERPL-CCNC: 118 U/L (ref 34–104)
ALT SERPL W P-5'-P-CCNC: 18 U/L (ref 7–52)
ALT SERPL W P-5'-P-CCNC: 19 U/L (ref 7–52)
ALT SERPL W P-5'-P-CCNC: 19 U/L (ref 7–52)
ALT SERPL W P-5'-P-CCNC: 20 U/L (ref 7–52)
ANION GAP SERPL CALCULATED.3IONS-SCNC: 12 MMOL/L (ref 4–13)
ANION GAP SERPL CALCULATED.3IONS-SCNC: 7 MMOL/L (ref 4–13)
ANION GAP SERPL CALCULATED.3IONS-SCNC: 8 MMOL/L (ref 4–13)
ANION GAP SERPL CALCULATED.3IONS-SCNC: 8 MMOL/L (ref 4–13)
APTT PPP: 23 SECONDS (ref 23–34)
AST SERPL W P-5'-P-CCNC: 24 U/L (ref 13–39)
AST SERPL W P-5'-P-CCNC: 28 U/L (ref 13–39)
AST SERPL W P-5'-P-CCNC: 29 U/L (ref 13–39)
AST SERPL W P-5'-P-CCNC: 31 U/L (ref 13–39)
BASE EXCESS BLDCOA CALC-SCNC: -11.9 MMOL/L (ref 3–11)
BASE EXCESS BLDCOV CALC-SCNC: -8.9 MMOL/L (ref 1–9)
BILIRUB SERPL-MCNC: 0.22 MG/DL (ref 0.2–1)
BILIRUB SERPL-MCNC: 0.23 MG/DL (ref 0.2–1)
BILIRUB SERPL-MCNC: 0.25 MG/DL (ref 0.2–1)
BILIRUB SERPL-MCNC: 0.26 MG/DL (ref 0.2–1)
BLD GP AB SCN SERPL QL: POSITIVE
BUN SERPL-MCNC: 14 MG/DL (ref 5–25)
BUN SERPL-MCNC: 15 MG/DL (ref 5–25)
CALCIUM ALBUM COR SERPL-MCNC: 7.6 MG/DL (ref 8.3–10.1)
CALCIUM ALBUM COR SERPL-MCNC: 7.7 MG/DL (ref 8.3–10.1)
CALCIUM ALBUM COR SERPL-MCNC: 7.7 MG/DL (ref 8.3–10.1)
CALCIUM ALBUM COR SERPL-MCNC: 7.8 MG/DL (ref 8.3–10.1)
CALCIUM SERPL-MCNC: 7 MG/DL (ref 8.4–10.2)
CALCIUM SERPL-MCNC: 7.1 MG/DL (ref 8.4–10.2)
CALCIUM SERPL-MCNC: 7.1 MG/DL (ref 8.4–10.2)
CALCIUM SERPL-MCNC: 7.3 MG/DL (ref 8.4–10.2)
CHLORIDE SERPL-SCNC: 100 MMOL/L (ref 96–108)
CHLORIDE SERPL-SCNC: 104 MMOL/L (ref 96–108)
CHLORIDE SERPL-SCNC: 104 MMOL/L (ref 96–108)
CHLORIDE SERPL-SCNC: 105 MMOL/L (ref 96–108)
CO2 SERPL-SCNC: 18 MMOL/L (ref 21–32)
CO2 SERPL-SCNC: 20 MMOL/L (ref 21–32)
CO2 SERPL-SCNC: 22 MMOL/L (ref 21–32)
CO2 SERPL-SCNC: 23 MMOL/L (ref 21–32)
CREAT SERPL-MCNC: 0.81 MG/DL (ref 0.6–1.3)
CREAT SERPL-MCNC: 0.96 MG/DL (ref 0.6–1.3)
CREAT SERPL-MCNC: 1.04 MG/DL (ref 0.6–1.3)
CREAT SERPL-MCNC: 1.2 MG/DL (ref 0.6–1.3)
ERYTHROCYTE [DISTWIDTH] IN BLOOD BY AUTOMATED COUNT: 13.8 % (ref 11.6–15.1)
ERYTHROCYTE [DISTWIDTH] IN BLOOD BY AUTOMATED COUNT: 13.8 % (ref 11.6–15.1)
ERYTHROCYTE [DISTWIDTH] IN BLOOD BY AUTOMATED COUNT: 13.9 % (ref 11.6–15.1)
ERYTHROCYTE [DISTWIDTH] IN BLOOD BY AUTOMATED COUNT: 14.1 % (ref 11.6–15.1)
FETAL CELL SCN BLD QL ROSETTE: NEGATIVE
FIBRINOGEN PPP-MCNC: 354 MG/DL (ref 206–523)
GFR SERPL CREATININE-BSD FRML MDRD: 57 ML/MIN/1.73SQ M
GFR SERPL CREATININE-BSD FRML MDRD: 67 ML/MIN/1.73SQ M
GFR SERPL CREATININE-BSD FRML MDRD: 74 ML/MIN/1.73SQ M
GFR SERPL CREATININE-BSD FRML MDRD: 91 ML/MIN/1.73SQ M
GLUCOSE SERPL-MCNC: 107 MG/DL (ref 65–140)
GLUCOSE SERPL-MCNC: 112 MG/DL (ref 65–140)
GLUCOSE SERPL-MCNC: 114 MG/DL (ref 65–140)
GLUCOSE SERPL-MCNC: 176 MG/DL (ref 65–140)
GLUCOSE SERPL-MCNC: 99 MG/DL (ref 65–140)
HCO3 BLDCOA-SCNC: 17.5 MMOL/L (ref 17.3–27.3)
HCO3 BLDCOV-SCNC: 18.1 MMOL/L (ref 12.2–28.6)
HCT VFR BLD AUTO: 35.5 % (ref 34.8–46.1)
HCT VFR BLD AUTO: 35.7 % (ref 34.8–46.1)
HCT VFR BLD AUTO: 36.4 % (ref 34.8–46.1)
HCT VFR BLD AUTO: 39.8 % (ref 34.8–46.1)
HGB BLD-MCNC: 11.9 G/DL (ref 11.5–15.4)
HGB BLD-MCNC: 12.1 G/DL (ref 11.5–15.4)
HGB BLD-MCNC: 12.2 G/DL (ref 11.5–15.4)
HGB BLD-MCNC: 12.7 G/DL (ref 11.5–15.4)
INR PPP: 0.99 (ref 0.85–1.19)
MAGNESIUM SERPL-MCNC: 4.7 MG/DL (ref 1.9–2.7)
MAGNESIUM SERPL-MCNC: 5 MG/DL (ref 1.9–2.7)
MAGNESIUM SERPL-MCNC: 5.1 MG/DL (ref 1.9–2.7)
MAGNESIUM SERPL-MCNC: 7.7 MG/DL (ref 1.9–2.7)
MCH RBC QN AUTO: 29.5 PG (ref 26.8–34.3)
MCH RBC QN AUTO: 30.4 PG (ref 26.8–34.3)
MCH RBC QN AUTO: 30.5 PG (ref 26.8–34.3)
MCH RBC QN AUTO: 30.9 PG (ref 26.8–34.3)
MCHC RBC AUTO-ENTMCNC: 31.9 G/DL (ref 31.4–37.4)
MCHC RBC AUTO-ENTMCNC: 33.2 G/DL (ref 31.4–37.4)
MCHC RBC AUTO-ENTMCNC: 33.5 G/DL (ref 31.4–37.4)
MCHC RBC AUTO-ENTMCNC: 34.2 G/DL (ref 31.4–37.4)
MCV RBC AUTO: 90 FL (ref 82–98)
MCV RBC AUTO: 91 FL (ref 82–98)
MCV RBC AUTO: 92 FL (ref 82–98)
MCV RBC AUTO: 92 FL (ref 82–98)
O2 CT VFR BLDCOA CALC: 7.2 ML/DL
OXYHGB MFR BLDCOA: 31.7 %
OXYHGB MFR BLDCOV: 39.9 %
PCO2 BLDCOA: 52.7 MM[HG] (ref 30–60)
PCO2 BLDCOV: 43 MM HG (ref 27–43)
PH BLDCOA: 7.14 [PH] (ref 7.23–7.43)
PH BLDCOV: 7.24 [PH] (ref 7.19–7.49)
PLATELET # BLD AUTO: 173 THOUSANDS/UL (ref 149–390)
PLATELET # BLD AUTO: 175 THOUSANDS/UL (ref 149–390)
PLATELET # BLD AUTO: 178 THOUSANDS/UL (ref 149–390)
PLATELET # BLD AUTO: 179 THOUSANDS/UL (ref 149–390)
PMV BLD AUTO: 11.2 FL (ref 8.9–12.7)
PMV BLD AUTO: 11.6 FL (ref 8.9–12.7)
PMV BLD AUTO: 11.7 FL (ref 8.9–12.7)
PMV BLD AUTO: 11.7 FL (ref 8.9–12.7)
PO2 BLDCOA: 18.8 MM HG (ref 5–25)
PO2 BLDCOV: 20.7 MM HG (ref 15–45)
POTASSIUM SERPL-SCNC: 4 MMOL/L (ref 3.5–5.3)
POTASSIUM SERPL-SCNC: 4.1 MMOL/L (ref 3.5–5.3)
POTASSIUM SERPL-SCNC: 4.3 MMOL/L (ref 3.5–5.3)
POTASSIUM SERPL-SCNC: 4.5 MMOL/L (ref 3.5–5.3)
PROT SERPL-MCNC: 6 G/DL (ref 6.4–8.4)
PROT SERPL-MCNC: 6 G/DL (ref 6.4–8.4)
PROT SERPL-MCNC: 6.3 G/DL (ref 6.4–8.4)
PROT SERPL-MCNC: 6.5 G/DL (ref 6.4–8.4)
PROTHROMBIN TIME: 13.8 SECONDS (ref 12.3–15)
RBC # BLD AUTO: 3.92 MILLION/UL (ref 3.81–5.12)
RBC # BLD AUTO: 3.95 MILLION/UL (ref 3.81–5.12)
RBC # BLD AUTO: 3.97 MILLION/UL (ref 3.81–5.12)
RBC # BLD AUTO: 4.31 MILLION/UL (ref 3.81–5.12)
RH BLD: NEGATIVE
SAO2 % BLDCOV: 8.5 ML/DL
SODIUM SERPL-SCNC: 130 MMOL/L (ref 135–147)
SODIUM SERPL-SCNC: 132 MMOL/L (ref 135–147)
SODIUM SERPL-SCNC: 134 MMOL/L (ref 135–147)
SODIUM SERPL-SCNC: 135 MMOL/L (ref 135–147)
WBC # BLD AUTO: 14.72 THOUSAND/UL (ref 4.31–10.16)
WBC # BLD AUTO: 15.01 THOUSAND/UL (ref 4.31–10.16)
WBC # BLD AUTO: 15.28 THOUSAND/UL (ref 4.31–10.16)
WBC # BLD AUTO: 16.56 THOUSAND/UL (ref 4.31–10.16)

## 2025-06-03 PROCEDURE — 99024 POSTOP FOLLOW-UP VISIT: CPT | Performed by: OBSTETRICS & GYNECOLOGY

## 2025-06-03 PROCEDURE — 83735 ASSAY OF MAGNESIUM: CPT

## 2025-06-03 PROCEDURE — 80053 COMPREHEN METABOLIC PANEL: CPT

## 2025-06-03 PROCEDURE — 86850 RBC ANTIBODY SCREEN: CPT

## 2025-06-03 PROCEDURE — 85384 FIBRINOGEN ACTIVITY: CPT

## 2025-06-03 PROCEDURE — 86901 BLOOD TYPING SEROLOGIC RH(D): CPT

## 2025-06-03 PROCEDURE — 85610 PROTHROMBIN TIME: CPT

## 2025-06-03 PROCEDURE — 82948 REAGENT STRIP/BLOOD GLUCOSE: CPT

## 2025-06-03 PROCEDURE — 85730 THROMBOPLASTIN TIME PARTIAL: CPT

## 2025-06-03 PROCEDURE — 86900 BLOOD TYPING SEROLOGIC ABO: CPT

## 2025-06-03 PROCEDURE — 85027 COMPLETE CBC AUTOMATED: CPT

## 2025-06-03 PROCEDURE — 59400 OBSTETRICAL CARE: CPT | Performed by: STUDENT IN AN ORGANIZED HEALTH CARE EDUCATION/TRAINING PROGRAM

## 2025-06-03 PROCEDURE — 85461 HEMOGLOBIN FETAL: CPT

## 2025-06-03 PROCEDURE — 88307 TISSUE EXAM BY PATHOLOGIST: CPT | Performed by: PATHOLOGY

## 2025-06-03 RX ORDER — MAGNESIUM SULFATE HEPTAHYDRATE 40 MG/ML
1 INJECTION, SOLUTION INTRAVENOUS CONTINUOUS
Status: DISCONTINUED | OUTPATIENT
Start: 2025-06-03 | End: 2025-06-04

## 2025-06-03 RX ORDER — ONDANSETRON 2 MG/ML
4 INJECTION INTRAMUSCULAR; INTRAVENOUS EVERY 8 HOURS PRN
Status: DISCONTINUED | OUTPATIENT
Start: 2025-06-03 | End: 2025-06-05 | Stop reason: HOSPADM

## 2025-06-03 RX ORDER — OXYCODONE HYDROCHLORIDE 10 MG/1
10 TABLET ORAL EVERY 4 HOURS PRN
Refills: 0 | Status: DISCONTINUED | OUTPATIENT
Start: 2025-06-03 | End: 2025-06-05 | Stop reason: HOSPADM

## 2025-06-03 RX ORDER — DOCUSATE SODIUM 100 MG/1
100 CAPSULE, LIQUID FILLED ORAL 2 TIMES DAILY
Status: DISCONTINUED | OUTPATIENT
Start: 2025-06-03 | End: 2025-06-05 | Stop reason: HOSPADM

## 2025-06-03 RX ORDER — OXYCODONE HYDROCHLORIDE 10 MG/1
5 TABLET ORAL EVERY 4 HOURS PRN
Refills: 0 | Status: DISCONTINUED | OUTPATIENT
Start: 2025-06-03 | End: 2025-06-03

## 2025-06-03 RX ORDER — IBUPROFEN 600 MG/1
600 TABLET, FILM COATED ORAL EVERY 6 HOURS
Status: DISCONTINUED | OUTPATIENT
Start: 2025-06-03 | End: 2025-06-03

## 2025-06-03 RX ORDER — HYDROMORPHONE HCL/PF 1 MG/ML
0.5 SYRINGE (ML) INJECTION ONCE
Status: COMPLETED | OUTPATIENT
Start: 2025-06-03 | End: 2025-06-03

## 2025-06-03 RX ORDER — OXYCODONE HYDROCHLORIDE 5 MG/1
5 TABLET ORAL EVERY 4 HOURS PRN
Qty: 10 TABLET | Refills: 0 | Status: CANCELLED | OUTPATIENT
Start: 2025-06-03 | End: 2025-06-13

## 2025-06-03 RX ORDER — CALCIUM CARBONATE 500 MG/1
1000 TABLET, CHEWABLE ORAL DAILY PRN
Status: DISCONTINUED | OUTPATIENT
Start: 2025-06-03 | End: 2025-06-05 | Stop reason: HOSPADM

## 2025-06-03 RX ORDER — BENZOCAINE/MENTHOL 6 MG-10 MG
1 LOZENGE MUCOUS MEMBRANE DAILY PRN
Status: DISCONTINUED | OUTPATIENT
Start: 2025-06-03 | End: 2025-06-05 | Stop reason: HOSPADM

## 2025-06-03 RX ORDER — IBUPROFEN 600 MG/1
600 TABLET, FILM COATED ORAL EVERY 6 HOURS SCHEDULED
Status: DISCONTINUED | OUTPATIENT
Start: 2025-06-04 | End: 2025-06-04

## 2025-06-03 RX ORDER — ACETAMINOPHEN 325 MG/1
650 TABLET ORAL EVERY 6 HOURS PRN
Status: DISCONTINUED | OUTPATIENT
Start: 2025-06-03 | End: 2025-06-03

## 2025-06-03 RX ORDER — KETOROLAC TROMETHAMINE 30 MG/ML
15 INJECTION, SOLUTION INTRAMUSCULAR; INTRAVENOUS ONCE
Status: COMPLETED | OUTPATIENT
Start: 2025-06-03 | End: 2025-06-03

## 2025-06-03 RX ORDER — DIPHENHYDRAMINE HYDROCHLORIDE 50 MG/ML
25 INJECTION, SOLUTION INTRAMUSCULAR; INTRAVENOUS EVERY 6 HOURS PRN
Status: DISCONTINUED | OUTPATIENT
Start: 2025-06-03 | End: 2025-06-05 | Stop reason: HOSPADM

## 2025-06-03 RX ORDER — OXYCODONE HYDROCHLORIDE 10 MG/1
5 TABLET ORAL EVERY 4 HOURS PRN
Refills: 0 | Status: DISCONTINUED | OUTPATIENT
Start: 2025-06-03 | End: 2025-06-05 | Stop reason: HOSPADM

## 2025-06-03 RX ORDER — SODIUM CHLORIDE 9 MG/ML
50 INJECTION, SOLUTION INTRAVENOUS CONTINUOUS
Status: DISPENSED | OUTPATIENT
Start: 2025-06-03 | End: 2025-06-04

## 2025-06-03 RX ORDER — OXYTOCIN/0.9 % SODIUM CHLORIDE 30/500 ML
250 PLASTIC BAG, INJECTION (ML) INTRAVENOUS ONCE
Status: COMPLETED | OUTPATIENT
Start: 2025-06-03 | End: 2025-06-02

## 2025-06-03 RX ORDER — ACETAMINOPHEN 325 MG/1
975 TABLET ORAL EVERY 6 HOURS PRN
Status: DISCONTINUED | OUTPATIENT
Start: 2025-06-03 | End: 2025-06-04

## 2025-06-03 RX ORDER — METOCLOPRAMIDE HYDROCHLORIDE 5 MG/ML
10 INJECTION INTRAMUSCULAR; INTRAVENOUS EVERY 6 HOURS PRN
Status: DISCONTINUED | OUTPATIENT
Start: 2025-06-03 | End: 2025-06-05 | Stop reason: HOSPADM

## 2025-06-03 RX ADMIN — SODIUM CHLORIDE 50 ML/HR: 0.9 INJECTION, SOLUTION INTRAVENOUS at 05:12

## 2025-06-03 RX ADMIN — NIFEDIPINE 30 MG: 30 TABLET, FILM COATED, EXTENDED RELEASE ORAL at 08:58

## 2025-06-03 RX ADMIN — ACETAMINOPHEN 975 MG: 325 TABLET ORAL at 06:41

## 2025-06-03 RX ADMIN — HYDROMORPHONE HYDROCHLORIDE 0.5 MG: 1 INJECTION, SOLUTION INTRAMUSCULAR; INTRAVENOUS; SUBCUTANEOUS at 13:39

## 2025-06-03 RX ADMIN — ACETAMINOPHEN 975 MG: 325 TABLET ORAL at 15:08

## 2025-06-03 RX ADMIN — IBUPROFEN 600 MG: 600 TABLET ORAL at 01:00

## 2025-06-03 RX ADMIN — BENZOCAINE AND LEVOMENTHOL 1 APPLICATION: 200; 5 SPRAY TOPICAL at 01:53

## 2025-06-03 RX ADMIN — HYDROCORTISONE 1 APPLICATION: 1 CREAM TOPICAL at 01:53

## 2025-06-03 RX ADMIN — HUMAN RHO(D) IMMUNE GLOBULIN 300 MCG: 300 INJECTION, SOLUTION INTRAMUSCULAR at 13:42

## 2025-06-03 RX ADMIN — ACETAMINOPHEN 975 MG: 325 TABLET ORAL at 21:54

## 2025-06-03 RX ADMIN — DOCUSATE SODIUM 100 MG: 100 CAPSULE, LIQUID FILLED ORAL at 18:25

## 2025-06-03 RX ADMIN — ACETAMINOPHEN 650 MG: 325 TABLET ORAL at 01:00

## 2025-06-03 RX ADMIN — WITCH HAZEL 1 PAD: 500 SOLUTION RECTAL; TOPICAL at 01:52

## 2025-06-03 RX ADMIN — DOCUSATE SODIUM 100 MG: 100 CAPSULE, LIQUID FILLED ORAL at 08:57

## 2025-06-03 RX ADMIN — KETOROLAC TROMETHAMINE 15 MG: 30 INJECTION, SOLUTION INTRAMUSCULAR; INTRAVENOUS at 21:09

## 2025-06-03 RX ADMIN — OXYCODONE HYDROCHLORIDE 10 MG: 10 TABLET ORAL at 18:30

## 2025-06-03 RX ADMIN — OXYCODONE HYDROCHLORIDE 5 MG: 10 TABLET ORAL at 12:21

## 2025-06-03 RX ADMIN — MAGNESIUM SULFATE HEPTAHYDRATE 1 G/HR: 40 INJECTION, SOLUTION INTRAVENOUS at 05:12

## 2025-06-03 NOTE — PROGRESS NOTES
Progress Note - OB/GYN   Name: Reddy Garcia 40 y.o. female I MRN: [id number]  Unit/Bed#: -01 I Date of Admission: 6/1/2025   Date of Service: 6/3/2025 I Hospital Day: 2     Assessment & Plan  Forceps delivery  Recovering well   Routine postpartum care, encourage ambulation, encourage familial bonding  Lactation support for breast/bottle feeding as needed  FEN: Tolerating regular diet  Pain: Motrin/Tylenol around the clock, oxycodone if needed  Appropriate bowel and bladder function     Rh negative state in antepartum period, third trimester  S/p Rhogam at 28 weeks on 4/4/25.  Rhogam pp baby O Pos, Mom O neg  Severe preeclampsia  - Originally with [place] diagnosis; met criteria for preeclampsia with severe features due to the sustained severe range BP's  - Monitor BP's and signs/symptoms of worsening preeclampsia  - Mag decreased from 2->1 held overnight for dizziness, restarted in early AM  - 6/1: Mag @2028, lab 20  - 6/2: lab 40, 20 + Procardia 30mg    OB Post-Partum Progress Note  Subjective   Reddy Garcia is a patient of: [facility]. She is PPD#1 s/p Forceps Assisted Vaginal Delivery. Recovering well and is stable.     Disposition    - Anticipate discharge home on PPD# 2  Barriers to discharge: None     Ellen Escobar MD  OBGYN PGY-1  6/3/2025 6:42 AM    Subjective/Objective     Chief Complaint: Postpartum State     Subjective:    Reddy Garcia has no current complaints and had no acute events overnight. Her pain is well controlled. She is currently voiding. She is ambulating. Patient is currently passing flatus and hasn't had a bowel movement. She is tolerating PO, and denies nausea or vomitting. She denies fever, chills, chest pain, shortness of breath, or calf tenderness. Lochia is minimal. She is Breastfeeding.     Objective :  Temp:  [97.9 °F (36.6 °C)-99.8 °F (37.7 °C)] 98.1 °F (36.7 °C)  HR:  [0-88] 76  BP: ()/(49-98) 139/74  Resp:  [16-20] 20  SpO2:  [89  %-100 %] 95 %  O2 Device: None (Room air)    GEN: Reddy Garcia appears well, alert and oriented x 3, pleasant and cooperative   CARDIO: Regular Rate  RESP:  Non-labored breathing  ABDOMEN: soft, appropriate tenderness, firm fundus below umbilicus  EXTREMITIES: Non tender, trace edema appropriate for post-pregnancy, no erythema       Lab Results: I have reviewed the following results:  Lab Results   Component Value Date    WBC 16.56 (H) 06/03/2025    HGB 12.1 06/03/2025    HCT 36.4 06/03/2025    MCV 92 06/03/2025     06/03/2025

## 2025-06-03 NOTE — OB LABOR/OXYTOCIN SAFETY PROGRESS
Oxytocin Safety Progress Check Note - Reddy Garcia 40 y.o. female MRN: [id number]    Unit/Bed#: -01 Encounter: [id number]    Dose (ta-units/min) Oxytocin: 4 ta-units/min (per Dr Decker)  Contraction Frequency (minutes): 1-5  Contraction Intensity: Mild/Moderate  Uterine Activity Characteristics: Regular (coupling at times)  Cervical Dilation: 10  Dilation Complete Date: 06/02/25  Dilation Complete Time: 2255  Cervical Effacement: 100  Fetal Station: 0  Baseline Rate (FHR): 125 bpm  Fetal Heart Rate (FHT): 125 BPM  FHR Category: 2               Vital Signs:   Vitals:    06/02/25 2200   BP: 120/57   Pulse:    Resp:    Temp:    SpO2:        Notes/comments:   SVE as above. Complete dilation, plan to start pushing. Dr Taylor aware.   Fht Cat 2 with late decelerations and minimal variability at this time. Variable decelerations resolved after amnioinfusion administered. Continue close fetal monitoring.      Kevan Matias DO 6/2/2025 11:01 PM

## 2025-06-03 NOTE — PLAN OF CARE
Problem: PAIN - ADULT  Goal: Verbalizes/displays adequate comfort level or baseline comfort level  Description: Interventions:  - Encourage patient to monitor pain and request assistance  - Assess pain using appropriate pain scale  - Administer analgesics as ordered based on type and severity of pain and evaluate response  - Implement non-pharmacological measures as appropriate and evaluate response  - Consider cultural and social influences on pain and pain management  - Notify physician/advanced practitioner if interventions unsuccessful or patient reports new pain  - Educate patient/family on pain management process including their role and importance of  reporting pain   - Provide non-pharmacologic/complimentary pain relief interventions  Outcome: Progressing     Problem: INFECTION - ADULT  Goal: Absence or prevention of progression during hospitalization  Description: INTERVENTIONS:  - Assess and monitor for signs and symptoms of infection  - Monitor lab/diagnostic results  - Monitor all insertion sites, i.e. indwelling lines, tubes, and drains  - Monitor endotracheal if appropriate and nasal secretions for changes in amount and color  - [place] appropriate cooling/warming therapies per order  - Administer medications as ordered  - Instruct and encourage patient and family to use good hand hygiene technique  - Identify and instruct in appropriate isolation precautions for identified infection/condition  Outcome: Progressing  Goal: Absence of fever/infection during neutropenic period  Description: INTERVENTIONS:  - Monitor WBC  - Perform strict hand hygiene  - Limit to healthy visitors only  - No plants, dried, fresh or silk flowers with stephens in patient room  Outcome: Progressing     Problem: SAFETY ADULT  Goal: Patient will remain free of falls  Description: INTERVENTIONS:  - Educate patient/family on patient safety including physical limitations  - Instruct patient to call for assistance with activity   -  Consider consulting OT/PT to assist with strengthening/mobility based on AM PAC & JH-HLM score  - Consult OT/PT to assist with strengthening/mobility   - Keep Call bell within reach  - Keep bed low and locked with side rails adjusted as appropriate  - Keep care items and personal belongings within reach  - Initiate and maintain comfort rounds  - Make Fall Risk Sign visible to staff  - Apply yellow socks and bracelet for high fall risk patients  - Consider moving patient to room near nurses station  Outcome: Progressing  Goal: Maintain or return to baseline ADL function  Description: INTERVENTIONS:  -  Assess patient's ability to carry out ADLs; assess patient's baseline for ADL function and identify physical deficits which impact ability to perform ADLs (bathing, care of mouth/teeth, toileting, grooming, dressing, etc.)  - Assess/evaluate cause of self-care deficits   - Assess range of motion  - Assess patient's mobility; develop plan if impaired  - Assess patient's need for assistive devices and provide as appropriate  - Encourage maximum independence but intervene and supervise when necessary  - Involve family in performance of ADLs  - Assess for home care needs following discharge   - Consider OT consult to assist with ADL evaluation and planning for discharge  - Provide patient education as appropriate  - Monitor functional capacity and physical performance, use of AM PAC & JH-HLM   - Monitor gait, balance and fatigue with ambulation    Outcome: Progressing  Goal: Maintains/Returns to pre admission functional level  Description: INTERVENTIONS:  - Perform AM-PAC 6 Click Basic Mobility/ Daily Activity assessment daily.  - Set and communicate daily mobility goal to care team and patient/family/caregiver.   - Collaborate with rehabilitation services on mobility goals if consulted  - Out of bed for toileting  - Record patient progress and toleration of activity level   Outcome: Progressing     Problem: DISCHARGE  PLANNING  Goal: Discharge to home or other facility with appropriate resources  Description: INTERVENTIONS:  - Identify barriers to discharge w/patient and caregiver  - Arrange for needed discharge resources and transportation as appropriate  - Identify discharge learning needs (meds, wound care, etc.)  - Arrange for interpretive services to assist at discharge as needed  - Refer to Case Management Department for coordinating discharge planning if the patient needs post-hospital services based on physician/advanced practitioner order or complex needs related to functional status, cognitive ability, or social support system  Outcome: Progressing     Problem: Knowledge Deficit  Goal: Patient/family/caregiver demonstrates understanding of disease process, treatment plan, medications, and discharge instructions  Description: Complete learning assessment and assess knowledge base.  Interventions:  - Provide teaching at level of understanding  - Provide teaching via preferred learning methods  Outcome: Progressing  Goal: Verbalizes understanding of labor plan  Description: Assess patient/family/caregiver's baseline knowledge level and ability to understand information.  Provide education via patient/family/caregiver's preferred learning method at appropriate level of understanding.     1. Provide teaching at level of understanding.  2. Provide teaching via preferred learning method(s).  Outcome: Progressing     Problem: Labor & Delivery  Goal: Manages discomfort  Description: Assess and monitor for signs and symptoms of discomfort.  Assess patient's pain level regularly and per hospital policy.  Administer medications as ordered. Support use of nonpharmacological methods to help control pain such as distraction, imagery, relaxation, and application of heat and cold.  Collaborate with interdisciplinary team and patient to determine appropriate pain management plan.    1. Include patient in decisions related to comfort.  2.  Offer non-pharmacological pain management interventions.  3. Report ineffective pain management to physician.  Outcome: Progressing  Goal: Patient vital signs are stable  Description: 1. Assess vital signs - vaginal delivery.  Outcome: Progressing

## 2025-06-03 NOTE — H&P
" Neonatology Delivery Note/[place] History and Physical   Reddy Garcia 40 y.o. female MRN: [id number]  Unit/Bed#: -01 Encounter: [id number]    Assessment & Plan     Assessment:  Admitting Diagnosis: Term      Plan:  Routine care.    History of Present Illness   HPI:  Reddy Garcia is a No birth weight on file. female born to a This patient's mother is not on file. This patient's mother is not on file. mother at Gestational Age: <None>.      Delivery Information:    Delivery Provider:  Emily Taylor MD   Route of delivery: Forcep's vaginal delivery    ROM Date: 2025  ROM Time: 5:54 PM  Length of ROM:             Fluid Color: Clear    Birth information:  YOB: 1985   Time of birth:     Sex: female   Delivery type:     Gestational Age: <None>     Additional  information:  Forceps:   Yes [1]   Vacuum:       Number of pop offs: None   Presentation: None [1]       Cord Complications: Vertex [1]***   Delayed Cord Clamping: {YES/NO:12661}            APGARS  One minute Five minutes Ten minutes   Heart rate: 2  2      Respiratory Effort: 2  2      Muscle tone: 2  2       Reflex Irritability: 2   2         Skin color: 0  2        Totals: 8  10        Neonatologist Note   I was called the Delivery Room for the birth of Reddy Garcia. My presence was requested by the OB Provider due to {[facility] OB CONDITION:71231}.     interventions: {[facility] DELMI INTERVENTIONS REQUIRED:46934::\"dried, warmed and stimulated\"}. Infant response to intervention: ***appropriate.    Prenatal History:   Prenatal Labs  This patient's mother is not on file.    Externally resulted Prenatal labs  This patient's mother is not on file.    Mom's GBS: This patient's mother is not on file.  GBS Prophylaxis: {GBS IAP:00489}    Pregnancy complications: ***   complications: ***    OB Suspicion of [place]: {YES/NO:21179}  Maternal antibiotics: {YES-NO-NA:92949}    Diabetes: {GDM " "List:35906}  Herpes: {HSV hx list:79221}    Prenatal U/S: {Prenatal US findings:97167}  Prenatal care: {Prenatal Hx list:68106}    Substance Abuse: {Social Hx list:10165}    Family History: non-contributory    Meds/Allergies   None    Vitamin K given:   PHYTONADIONE 1 MG/0.5ML IJ SOLN has not been administered.     Erythromycin given:   ERYTHROMYCIN 5 MG/GM OP OINT has not been administered.       Objective   Vitals:   Temperature: 99.8 °F (37.7 °C)  Pulse: 82  Respirations: 16  Height: 162.6 cm (5' 4\")  Weight - Scale: 35773 g (213 lb 3.2 oz)    Physical Exam:   General Appearance:  Alert, active, no distress  Head:  Normocephalic, AFOF                             Eyes:  Conjunctiva clear, ***+RR ou  Ears:  Normally placed, no anomalies  Nose: Midline, nares patent and symmetric                        Mouth:  Palate intact, normal gums  Respiratory:  Breath sounds clear and equal; No grunting, retractions, or nasal flaring  Cardiovascular:  Regular rate and rhythm. No murmur. Adequate perfusion/capillary refill. Femoral pulses present  Abdomen:   Soft, non-distended, no masses, bowel sounds present, no HSM  Genitourinary:  Normal female genitalia, anus appears patent  Musculoskeletal:  Normal hips  Skin/Hair/Nails:   Skin warm, dry, and intact, no rashes   Spine:  No hair arlene or dimples              Neurologic:   Normal tone, reflexes intact  "

## 2025-06-03 NOTE — ANESTHESIA POSTPROCEDURE EVALUATION
Post-Op Assessment Note    CV Status:  Stable    Pain management: satisfactory to patient      Post-op block assessment: catheter intact and no complications   Mental Status:  Alert and awake   Hydration Status:  Stable   PONV Controlled:  None   Airway Patency:  Patent     Post Op Vitals Reviewed: Yes    No anethesia notable event occurred.    Staff: CRNA         Epidural removed without issue.

## 2025-06-03 NOTE — QUICK NOTE
Called to bedside by nursing for 9/10 perineal/vaginal pain, unrelieved by ice and tylenol. NSAIDs held currently in the setting of elevated creatinine (1.04 > 0.96), currently downtrending. Hgb stable at 12.2 g/dL.     Exam without visible hematoma, internal exam without palpable defects or hematoma.     Vitals:    06/03/25 1156   BP: 142/74   Pulse: 83   Resp: 18   Temp: 97.9 °F (36.6 °C)   SpO2: 97%       Plan:  - roxicodone 5/10 mg PO for breakthrough   - 0.5 mg dilaudid IV now for pain   - continue tylenol 975 mg q8h  - consider restarting NSAIDs if creatinine continues to downtrend at next labs, f/u labs    Patient seen and examined w/ Dr. Davis.     Lashell Abreu MD  OBGYN, PGY-I  06/03/25  12:53 PM

## 2025-06-03 NOTE — PLAN OF CARE
Problem: PAIN - ADULT  Goal: Verbalizes/displays adequate comfort level or baseline comfort level  Description: Interventions:  - Encourage patient to monitor pain and request assistance  - Assess pain using appropriate pain scale  - Administer analgesics as ordered based on type and severity of pain and evaluate response  - Implement non-pharmacological measures as appropriate and evaluate response  - Consider cultural and social influences on pain and pain management  - Notify physician/advanced practitioner if interventions unsuccessful or patient reports new pain  - Educate patient/family on pain management process including their role and importance of  reporting pain   - Provide non-pharmacologic/complimentary pain relief interventions  Outcome: Progressing     Problem: INFECTION - ADULT  Goal: Absence or prevention of progression during hospitalization  Description: INTERVENTIONS:  - Assess and monitor for signs and symptoms of infection  - Monitor lab/diagnostic results  - Monitor all insertion sites, i.e. indwelling lines, tubes, and drains  - Monitor endotracheal if appropriate and nasal secretions for changes in amount and color  - Dur[place] appropriate cooling/warming therapies per order  - Administer medications as ordered  - Instruct and encourage patient and family to use good hand hygiene technique  - Identify and instruct in appropriate isolation precautions for identified infection/condition  Outcome: Progressing  Goal: Absence of fever/infection during neutropenic period  Description: INTERVENTIONS:  - Monitor WBC  - Perform strict hand hygiene  - Limit to healthy visitors only  - No plants, dried, fresh or silk flowers with stephens in patient room  Outcome: Progressing     Problem: SAFETY ADULT  Goal: Patient will remain free of falls  Description: INTERVENTIONS:  - Educate patient/family on patient safety including physical limitations  - Instruct patient to call for assistance with activity   -  Consider consulting OT/PT to assist with strengthening/mobility based on AM PAC & JH-HLM score  - Consult OT/PT to assist with strengthening/mobility   - Keep Call bell within reach  - Keep bed low and locked with side rails adjusted as appropriate  - Keep care items and personal belongings within reach  - Initiate and maintain comfort rounds  - Make Fall Risk Sign visible to staff  - Apply yellow socks and bracelet for high fall risk patients  - Consider moving patient to room near nurses station  Outcome: Progressing  Goal: Maintain or return to baseline ADL function  Description: INTERVENTIONS:  -  Assess patient's ability to carry out ADLs; assess patient's baseline for ADL function and identify physical deficits which impact ability to perform ADLs (bathing, care of mouth/teeth, toileting, grooming, dressing, etc.)  - Assess/evaluate cause of self-care deficits   - Assess range of motion  - Assess patient's mobility; develop plan if impaired  - Assess patient's need for assistive devices and provide as appropriate  - Encourage maximum independence but intervene and supervise when necessary  - Involve family in performance of ADLs  - Assess for home care needs following discharge   - Consider OT consult to assist with ADL evaluation and planning for discharge  - Provide patient education as appropriate  - Monitor functional capacity and physical performance, use of AM PAC & JH-HLM   - Monitor gait, balance and fatigue with ambulation    Outcome: Progressing  Goal: Maintains/Returns to pre admission functional level  Description: INTERVENTIONS:  - Perform AM-PAC 6 Click Basic Mobility/ Daily Activity assessment daily.  - Set and communicate daily mobility goal to care team and patient/family/caregiver.   - Collaborate with rehabilitation services on mobility goals if consulted  - Out of bed for toileting  - Record patient progress and toleration of activity level   Outcome: Progressing     Problem: DISCHARGE  PLANNING  Goal: Discharge to home or other facility with appropriate resources  Description: INTERVENTIONS:  - Identify barriers to discharge w/patient and caregiver  - Arrange for needed discharge resources and transportation as appropriate  - Identify discharge learning needs (meds, wound care, etc.)  - Arrange for interpretive services to assist at discharge as needed  - Refer to Case Management Department for coordinating discharge planning if the patient needs post-hospital services based on physician/advanced practitioner order or complex needs related to functional status, cognitive ability, or social support system  Outcome: Progressing     Problem: Knowledge Deficit  Goal: Patient/family/caregiver demonstrates understanding of disease process, treatment plan, medications, and discharge instructions  Description: Complete learning assessment and assess knowledge base.  Interventions:  - Provide teaching at level of understanding  - Provide teaching via preferred learning methods  Outcome: Progressing  Goal: Verbalizes understanding of labor plan  Description: Assess patient/family/caregiver's baseline knowledge level and ability to understand information.  Provide education via patient/family/caregiver's preferred learning method at appropriate level of understanding.     1. Provide teaching at level of understanding.  2. Provide teaching via preferred learning method(s).  Outcome: Progressing     Problem: Labor & Delivery  Goal: Manages discomfort  Description: Assess and monitor for signs and symptoms of discomfort.  Assess patient's pain level regularly and per hospital policy.  Administer medications as ordered. Support use of nonpharmacological methods to help control pain such as distraction, imagery, relaxation, and application of heat and cold.  Collaborate with interdisciplinary team and patient to determine appropriate pain management plan.    1. Include patient in decisions related to comfort.  2.  Offer non-pharmacological pain management interventions.  3. Report ineffective pain management to physician.  Outcome: Progressing  Goal: Patient vital signs are stable  Description: 1. Assess vital signs - vaginal delivery.  Outcome: Progressing

## 2025-06-03 NOTE — L&D DELIVERY NOTE
OBGYN Vaginal Delivery Summary  Reddy Garcia 40 y.o. female MRN: [id number]  Unit/Bed#: -01 Encounter: [id number]    Predelivery Diagnosis:  1. Pregnancy at 37 weeks gestation  2. Pre-eclampsia with severe features     Postdelivery Diagnosis:  1. Same as above  2. Delivery of full-term     Procedure: spontaneous vaginal delivery, repair of 2nd degree laceration(s)    Attending: Dr. Emily Taylor    Assistant: LORENZO Guzmán MD    Anesthesia: Epidural    QBL: 82mL  Admission Hgb: 13.1g/dL  Admission platelets: 178 thousands/uL    Complications: none apparent    Specimens: cord blood, arterial and venous cord blood gases, placenta to pathology    Findings:   1. Viable female at 2352, with APGARS of 8 and 10 at 1 and 5 minutes respectively. Weight pending at time of dictation.  2. Spontaneous delivery of intact placenta at 2356. Central insertion 3 vessel umbilical cord  3. 2nd degree laceration repaired with 3-0 Synthetic suture  4. Blood gases:  Umbilical Cord Venous Blood Gas:  Results from last 7 days   Lab Units 25  2358   PH COV  7.243   PCO2 COV mm HG 43.0   HCO3 COV mmol/L 18.1   BASE EXC COV mmol/L -8.9*   O2 CT CD VB mL/dL 8.5   O2 HGB, VENOUS CORD % 39.9     Umbilical Cord Arterial Blood Gas:  Results from last 7 days   Lab Units 25  2358   PH COA  7.138*   PCO2 COA  52.7   PO2 COA mm HG 18.8   HCO3 COA mmol/L 17.5   BASE EXC COA mmol/L -11.9*   O2 CONTENT CORD ART ml/dl 7.2   O2 HGB, ARTERIAL CORD % 31.7       Brief history and labor course:  Reddy Garcia is a 40 y.o.  at 37wk1d. She presented to labor and delivery for an induction of labor for Pre-eclampsia with severe features. She progressed to complete cervical dilation and began pushing.     Description of procedure  Indication for forceps was Category II fetal heart tracing. Patient was complete and 2 station, baby was SARA. Patient was cordelia q3 minutes, FHT Category II.  Patient was felt to have high  likelihood for successful operative vaginal delivery    Patient was informed of risks and benefits of the procedure. Risks included, but were not limited to, bleeding, infection, injury to the vagina, cervix, urethra, bladder, rectum, and perineum. Patient was also counseled on fetal risks including scalp laceration, bruising, subgaleal hematoma, cephalohematoma, intracranial hematoma,  jaundice, facial fractures or facial nerve injury, and shoulder dystocia. All questions were answered and the patient was consented to the procedure.    The patient is bladder was emptied to avoid injury, adequate anesthesia was noted and the patient was in dorsal lithotomy position.  The fetal position was checked and reconfirmed to be SARA at +2 station. John-Cardona Forceps were used.  The posterior blade was inserted first followed by the anterior blade along the occipital diameter and equidistance from the sagittal suture. There was one fingerbreadth between the shanks and the posterior fontanelle.  The plates were then articulated and traction was applied with start of a contraction.  Advancement of the fetal head was noted with each traction and a total number of 3 pulls were necessary.  The forceps was released upon  of the fetal head and the delivery was performed after the assistance of maternal expulsive efforts.     After pushing for a total of 32 minutes, the patient delivered a viable female  at 2352 on 25, weight pending at time of dictation, apgars of 8 (1 min) and 10 (5 min). The fetal vertex delivered spontaneously. There was no nuchal cord. The anterior (right) shoulder delivered atraumatically with maternal expulsive forces and the assistance of gentle downward traction. The posterior shoulder delivered with maternal expulsive forces and the assistance of gentle upward traction. The remainder of the fetus then delivered spontaneously.     Upon delivery the infant was placed on the  mother's abdomen and delayed cord clamping was performed. The umbilical cord was then doubly clamped and cut. The infant was noted to cry spontaneously and was moving all extremities appropriately. There was no evidence for injury. Awaiting nurse resuscitators evaluated the . Arterial and venous cord blood gases and cord blood were collected for analysis and were promptly sent to the lab. In the immediate post-partum, IV pitocin was administered, and the uterus was noted to contract down well with massage and pitocin. The placenta delivered with uterine massage at 2356 and was noted to be intact and had central insertion of a 3 vessel cord. The placenta was sent to pathology.    The vagina, cervix, perineum, and rectum were inspected. A 2nd degree laceration(s) were noted. Repair was completed with 3-0 synthetic suture.    At the conclusion of the procedure, all needle, sponge, and instrument counts were noted to be correct.      Dr. Taylor was present and participated in all key portions of the case.    Disposition:  Patient tolerated the procedure well and was recovering in labor and delivery room.       Shania Guzmán MD  6/3/2025  2:43 AM

## 2025-06-03 NOTE — LACTATION NOTE
This note was copied from a baby's chart.  CONSULT - LACTATION  Baby Girl (Anyul) John Garcia 1 days female MRN: [id number]    [facility] AN NURSERY Room / Bed: (N)/(N) Encounter: [id number]    Maternal Information     MOTHER:  Reddy Garcia  Maternal Age: 40 y.o.  OB History: # 1 - Date: 25, Sex: Female, Weight: 2765 g (6 lb 1.5 oz), GA: 37w0d, Type: Vaginal, Forceps, Apgar1: 8, Apgar5: 10, Living: Living, Birth Comments: None   Previous breast reduction surgery? No    Lactation history:   Has patient previously breast fed: No   How long had patient previously breast fed:     Previous breast feeding complications:       Past Surgical History:   Procedure Laterality Date    INGUINAL HERNIA REPAIR         Birth information:  YOB: 2024   Time of birth: 11:52 PM   Sex: female   Delivery type: Vaginal, Forceps   Birth Weight: 2765 g (6 lb 1.5 oz)   Percent of Weight Change: 0%     Gestational Age: 37w0d   [unfilled]    Reason for Consult:    Reason for Consult: Initial assessment (ext) - 20 min, Latch Assess (ext) - 20 min    Risk Factors:         Breast and nipple assessment:   Breasts/Nipples  Left Breast: Soft  Right Breast: Soft  Left Nipple: Everted  Right Nipple: Everted  Intervention: Hand expression  Breastfeeding Status: Yes  Breastfeeding Progress: Not yet established    OB Lactation Tools:         Breast Pump:    Breast Pump  Pump: Personal (Mom Jori)       Assessment: normal assessment    Feeding assessment: feeding well  LATCH:  Latch: Grasps breast, tongue down, lips flanged, rhythmic sucking   Audible Swallowing: Spontaneous and intermittent (24 hours old)   Type of Nipple: Everted (After stimulation)   Comfort (Breast/Nipple): Soft/non-tender   Hold (Positioning): Partial assist, teach one side, mother does other, staff holds   LATCH Score: 9         Feeding recommendations:  breast feed on demand  Initial Consult: Met  with parents to discuss feeding plan. Mom is breastfeeding baby. Mom reports baby did well after delivery but has been sleepy. Education given on first 24 hours and breast feeding.     Mom having a lot of pain from delivery. Mom positioned in laid back position. Demonstration with teach back of hand expression, effective for drops. Education given on colostrum and mature milk.   LC assisted in latching on right breast in laid back position. Baby latched deeply and actively sucking with swallows. Reviewed with mom proper position and alignment for a deep latch. Reviewed hunger/fullness cues. Demonstration of how to stimulate baby for extra sucks. Mom verbalized understanding. Encouraged mom to call for further assistance.     Feeding plan:  Family is encouraged to breastfeed on demand, every 2-3 hours or when baby showing early feeding cues. Reviewed to offer both breasts during a feed.  Discussed the importance of ensuring that baby feeds 8-12x in 24 hours and not waiting over 3 hours to feed. Educated to watch baby for hunger and fullness cues.    Education:   Information on hand expression given. Discussed benefits of knowing how to manually express breast including stimulating milk supply, softening nipple for latch and evacuating breast in the event of engorgement.    Demonstration and teach back of deeper latch with baby deeper into mom's axilla and baby's chest against the breast. Alignment of ear, shoulder, hip and tucked into mom's arm. Alignment of nipple to nose, once wide mouth is achieved, snug hold between the upper shoulders. Take baby to breast, not breast to baby. Active, coordinated sucking achieved. Ed. On breathing and muscle breaks. Ed. On breast compressions, timing of feeds and signs of satiation.      Baby is demonstrating (Early, mid-level, late-stage feeding cues). Ed. On how to understand baby's feeding cues. Referred parents to pictures on the back of the feeding log.     Encouraged  parents to call for assistance, questions, and concerns about breastfeeding.  Extension provided.  Farhana Ramirez MA, IBCLC  6/3/2025 1:10 PM

## 2025-06-03 NOTE — DISCHARGE SUMMARY
Discharge Summary - OB/GYN   Name: Reddy Garcia 40 y.o. female I MRN: [id number]  Unit/Bed#: -01 I Date of Admission: 2025   Date of Service: 2025 I Hospital Day: 4    ADMISSION  Patient of: [facility]  Admission Date: 2025   Admitting Attending: Dr. Dunaway  Admitting Diagnoses: Problem List[1]    DELIVERY  Delivery Method: Vaginal, Forceps   Delivery Date and Time: 2025 11:52 PM  Delivery Attending: Emily Taylor    DISCHARGE  Discharge Date: 25  Discharge Attending: Dr. Dunaway  Discharge Diagnosis:   Same, Delivered    Clinical course: Admission to Delivery  Reddy Garcia is a 40 y.o.  who was admitted at 37w1d for induction of labor for gestational hypertension.  She had met criteria for preeclampsia with severe features due to sustained severe range blood pressures while in triage.  She received 20 mg of IV labetalol and was started on magnesium gtt. her initial SVE was 0.5/50/-4.  She received 2 doses of Cytotec while she was in triage.  She had another set of sustained severe range blood pressures and received acute treatment with 40 mg of IV labetalol while in triage.  After she was moved to a labor room, she was noted to be 3/50/-3 and received an additional dose of vaginal Cytotec.  After several hours of being unchanged after vaginal Cytotec, Pitocin was started at 1618 hrs.  She had artificial rupture of membranes with clear fluid.  Shortly after artificial rupture of membranes she had 2 additional sustained severe range blood pressures and required acute treatment with 20 mg of IV labetalol.  She then received an epidural for analgesia.  Later, fetal heart tracing was notable for variable decelerations with moderate variability.  Pitocin at that time was decreased to 4 milliunits/min and the patient was repositioned.  The variable decelerations persisted despite interventions and an IUPC and amnioinfusion were initiated.  She later  progressed to complete cervical dilation and began pushing.  At time of pushing, fetal heart tracing was noted to be category 2 with intermittent late decelerations and minimal variability, however, variable decelerations had resolved after amnioinfusion was started.  After pushing for 25 minutes, a decision was made after counseling the patient for a forceps assisted vaginal delivery due to maternal fatigue and suspicion for fetal compromise with category 2 fetal heart tracing.    Delivery  Route of Delivery: Vaginal, Forceps    Anesthesia: Epidural,   QBL: Non-Surgical QBL (mL): 82        Delivery: Vaginal, Forceps at 2025 11:52 PM  Laceration: Perineal: 2° Repaired?  Yes    Baby's Weight: 2765 g (6 lb 1.5 oz); 97.53    Apgar scores: 8  and 10  at 1 and 5 minutes, respectively    Clinical Course: Post-Delivery:  The post delivery course was remarkable for poor pain control which required multimodal pain management. Preeclampsia with severe features requiring magnesium and she was started on Procardia 30 mg qd to manage her blood pressures.    On the day of discharge, the patient was ambulating, voiding spontaneously, tolerating oral intake, and hemodynamically stable. She was able to reasonably perform all ADLs. She had appropriate bowel function. Pain was well-controlled. She was discharged home on postpartum/postop day #2 without complications. Patient was instructed to follow up with her OB as an outpatient and was given appropriate warnings to call her provider with problems or concerns.    Pertinent lab findings included:   Blood type O-; received rhogam.     Last three Hgb values:  Lab Results   Component Value Date    HGB 11.3 (L) 2025    HGB 11.9 2025    HGB 12.2 2025        Problem-specific follow-up plans included the following:  Assessment & Plan  Forceps delivery  Recovering well   Routine postpartum care, encourage ambulation, encourage familial bonding  Lactation support for  breast/bottle feeding as needed  FEN: Tolerating regular diet  Pain: Tylenol around the clock, oxycodone, dilaudid,  if needed  Appropriate bowel and bladder function     Rh negative state in antepartum period, third trimester  - S/p Rhogam at 28 weeks on 4/4/25.  - Rhogam pp baby O Pos, Mom O neg  Severe preeclampsia  - Originally with gHTN diagnosis; met criteria for preeclampsia with severe features due to the sustained severe range BP's  - Monitor BP's and signs/symptoms of worsening preeclampsia  - Mag decreased from 2 -> 1 held overnight for dizziness, restarted in early AM  - 6/1: Mag @2028, lab 20  - 6/2: lab 40, 20 + Procardia 30mg; mag d/c'd  - 6/3-present: Procardia 30mg      Discharge med list:       Medication List      START taking these medications     benzocaine-menthol-lanolin-aloe 20-0.5 % topical spray; Commonly known   as: DERMOPLAST; Apply 1 Application topically every 6 (six) hours as   needed for mild pain or irritation   bisacodyl 10 mg suppository; Commonly known as: DULCOLAX; Insert 1   suppository (10 mg total) into the rectum daily   calcium carbonate 500 mg chewable tablet; Commonly known as: TUMS; Chew   2 tablets (1,000 mg total) daily as needed for indigestion or heartburn   docusate sodium 100 mg capsule; Commonly known as: COLACE; Take 1   capsule (100 mg total) by mouth 2 (two) times a day   gabapentin 100 mg capsule; Commonly known as: NEURONTIN; Take 1 capsule   (100 mg total) by mouth 3 (three) times a day   hydrocortisone 1 % cream; Apply 1 Application topically daily as needed   for irritation or rash   ibuprofen 800 mg tablet; Commonly known as: MOTRIN; Take 1 tablet (800   mg total) by mouth every 6 (six) hours   lidocaine 5 %; Commonly known as: LIDODERM; Apply 2 patches topically   daily over 12 hours Remove & Discard patch within 12 hours or as directed   by MD   NIFEdipine ER 30 MG 24 hr tablet; Commonly known as: ADALAT CC; Take 1   tablet (30 mg total) by mouth daily    witch hazel-glycerin topical pad; Commonly known as: TUCKS; Apply 1 Pad   topically every 4 (four) hours as needed for irritation     CONTINUE taking these medications     acetaminophen 500 mg tablet; Commonly known as: TYLENOL   PRENATAL PO       Condition at discharge:   good     Disposition:   Home    Planned Readmission:   No         [1]   Patient Active Problem List  Diagnosis    Forceps delivery    Primigravida of advanced maternal age in third trimester    Episodic tension-type headache    Back and leg pain in pregnancy    Gestational hypertension    Rh negative state in antepartum period, third trimester    Severe preeclampsia

## 2025-06-03 NOTE — PLAN OF CARE
Problem: PAIN - ADULT  Goal: Verbalizes/displays adequate comfort level or baseline comfort level  Description: Interventions:  - Encourage patient to monitor pain and request assistance  - Assess pain using appropriate pain scale  - Administer analgesics as ordered based on type and severity of pain and evaluate response  - Implement non-pharmacological measures as appropriate and evaluate response  - Consider cultural and social influences on pain and pain management  - Notify physician/advanced practitioner if interventions unsuccessful or patient reports new pain  - Educate patient/family on pain management process including their role and importance of  reporting pain   - Provide non-pharmacologic/complimentary pain relief interventions  6/3/2025 0620 by Jyothi Byers RN  Outcome: Progressing  6/3/2025 0620 by Jyothi Byers RN  Outcome: Progressing     Problem: INFECTION - ADULT  Goal: Absence or prevention of progression during hospitalization  Description: INTERVENTIONS:  - Assess and monitor for signs and symptoms of infection  - Monitor lab/diagnostic results  - Monitor all insertion sites, i.e. indwelling lines, tubes, and drains  - Monitor endotracheal if appropriate and nasal secretions for changes in amount and color  - [place] appropriate cooling/warming therapies per order  - Administer medications as ordered  - Instruct and encourage patient and family to use good hand hygiene technique  - Identify and instruct in appropriate isolation precautions for identified infection/condition  6/3/2025 0620 by Jyothi Byers RN  Outcome: Progressing  6/3/2025 0620 by Jyothi Byers RN  Outcome: Progressing  Goal: Absence of fever/infection during neutropenic period  Description: INTERVENTIONS:  - Monitor WBC  - Perform strict hand hygiene  - Limit to healthy visitors only  - No plants, dried, fresh or silk flowers with stephens in patient room  6/3/2025 0620 by Jyothi Byers RN  Outcome: Progressing  6/3/2025 0620 by Jyothi  NGHIA Byers  Outcome: Progressing     Problem: SAFETY ADULT  Goal: Patient will remain free of falls  Description: INTERVENTIONS:  - Educate patient/family on patient safety including physical limitations  - Instruct patient to call for assistance with activity   - Consider consulting OT/PT to assist with strengthening/mobility based on AM PAC & JH-HLM score  - Consult OT/PT to assist with strengthening/mobility   - Keep Call bell within reach  - Keep bed low and locked with side rails adjusted as appropriate  - Keep care items and personal belongings within reach  - Initiate and maintain comfort rounds  - Make Fall Risk Sign visible to staff  - Apply yellow socks and bracelet for high fall risk patients  - Consider moving patient to room near nurses station  6/3/2025 0620 by Jyothi Byers RN  Outcome: Progressing  6/3/2025 0620 by Jyothi Byers RN  Outcome: Progressing  Goal: Maintain or return to baseline ADL function  Description: INTERVENTIONS:  -  Assess patient's ability to carry out ADLs; assess patient's baseline for ADL function and identify physical deficits which impact ability to perform ADLs (bathing, care of mouth/teeth, toileting, grooming, dressing, etc.)  - Assess/evaluate cause of self-care deficits   - Assess range of motion  - Assess patient's mobility; develop plan if impaired  - Assess patient's need for assistive devices and provide as appropriate  - Encourage maximum independence but intervene and supervise when necessary  - Involve family in performance of ADLs  - Assess for home care needs following discharge   - Consider OT consult to assist with ADL evaluation and planning for discharge  - Provide patient education as appropriate  - Monitor functional capacity and physical performance, use of AM PAC & JH-HLM   - Monitor gait, balance and fatigue with ambulation    6/3/2025 0620 by Jyothi Byers RN  Outcome: Progressing  6/3/2025 0620 by Jyothi Byers RN  Outcome: Progressing  Goal: Maintains/Returns  to pre admission functional level  Description: INTERVENTIONS:  - Perform AM-PAC 6 Click Basic Mobility/ Daily Activity assessment daily.  - Set and communicate daily mobility goal to care team and patient/family/caregiver.   - Collaborate with rehabilitation services on mobility goals if consulted  - Out of bed for toileting  - Record patient progress and toleration of activity level   6/3/2025 0620 by Jyothi Byers RN  Outcome: Progressing  6/3/2025 0620 by Jyothi Byers RN  Outcome: Progressing     Problem: DISCHARGE PLANNING  Goal: Discharge to home or other facility with appropriate resources  Description: INTERVENTIONS:  - Identify barriers to discharge w/patient and caregiver  - Arrange for needed discharge resources and transportation as appropriate  - Identify discharge learning needs (meds, wound care, etc.)  - Arrange for interpretive services to assist at discharge as needed  - Refer to Case Management Department for coordinating discharge planning if the patient needs post-hospital services based on physician/advanced practitioner order or complex needs related to functional status, cognitive ability, or social support system  6/3/2025 0620 by Jyothi Byers RN  Outcome: Progressing  6/3/2025 0620 by Jyothi Byers RN  Outcome: Progressing     Problem: Knowledge Deficit  Goal: Patient/family/caregiver demonstrates understanding of disease process, treatment plan, medications, and discharge instructions  Description: Complete learning assessment and assess knowledge base.  Interventions:  - Provide teaching at level of understanding  - Provide teaching via preferred learning methods  6/3/2025 0620 by Jyothi Byers RN  Outcome: Progressing  6/3/2025 0620 by Jyothi Byers RN  Outcome: Progressing  Goal: Verbalizes understanding of labor plan  Description: Assess patient/family/caregiver's baseline knowledge level and ability to understand information.  Provide education via patient/family/caregiver's preferred learning  method at appropriate level of understanding.     1. Provide teaching at level of understanding.  2. Provide teaching via preferred learning method(s).  6/3/2025 0620 by Jyothi Byers RN  Outcome: Progressing  6/3/2025 0620 by Jyothi Byers RN  Outcome: Progressing     Problem: Labor & Delivery  Goal: Manages discomfort  Description: Assess and monitor for signs and symptoms of discomfort.  Assess patient's pain level regularly and per hospital policy.  Administer medications as ordered. Support use of nonpharmacological methods to help control pain such as distraction, imagery, relaxation, and application of heat and cold.  Collaborate with interdisciplinary team and patient to determine appropriate pain management plan.    1. Include patient in decisions related to comfort.  2. Offer non-pharmacological pain management interventions.  3. Report ineffective pain management to physician.  6/3/2025 0620 by Jyothi Byers RN  Outcome: Progressing  6/3/2025 0620 by Jyothi Byers RN  Outcome: Progressing  Goal: Patient vital signs are stable  Description: 1. Assess vital signs - vaginal delivery.  6/3/2025 0620 by Jyothi Byers RN  Outcome: Progressing  6/3/2025 0620 by Jyothi Byers RN  Outcome: Progressing     Problem: Labor & Delivery  Goal: Manages discomfort  Description: Assess and monitor for signs and symptoms of discomfort.  Assess patient's pain level regularly and per hospital policy.  Administer medications as ordered. Support use of nonpharmacological methods to help control pain such as distraction, imagery, relaxation, and application of heat and cold.  Collaborate with interdisciplinary team and patient to determine appropriate pain management plan.    1. Include patient in decisions related to comfort.  2. Offer non-pharmacological pain management interventions.  3. Report ineffective pain management to physician.  6/3/2025 0620 by Jyothi Byers RN  Outcome: Progressing  6/3/2025 0620 by Jyothi Byers RN  Outcome:  Progressing  Goal: Patient vital signs are stable  Description: 1. Assess vital signs - vaginal delivery.  6/3/2025 0620 by Jyothi Byers RN  Outcome: Progressing  6/3/2025 0620 by Jyothi Byers, RN  Outcome: Progressing

## 2025-06-03 NOTE — OB LABOR/OXYTOCIN SAFETY PROGRESS
Oxytocin Safety Progress Check Note - Reddy Garcia 40 y.o. female MRN: [id number]    Unit/Bed#: -01 Encounter: [id number]    Dose (ta-units/min) Oxytocin: 4 ta-units/min (per Dr Decker)  Contraction Frequency (minutes): 1-5  Contraction Intensity: Mild/Moderate  Uterine Activity Characteristics: Regular (coupling at times)  Cervical Dilation: 8        Cervical Effacement: 90  Fetal Station: -1  Baseline Rate (FHR): 125 bpm  Fetal Heart Rate (FHT): 125 BPM  FHR Category: 2               Vital Signs:   Vitals:    06/02/25 2154   BP:    Pulse:    Resp:    Temp: 99.3 °F (37.4 °C)   SpO2:        Notes/comments:   SVE as above. Now in active labor. Fht Cat 2 with recurrent variable decelerations with lane to 80s. IUPC placed and amnioinfusion to be started. Ongoing maternal repositioning. Pitocin currently at 4 mu/min. Continue to monitor closely. Dr. Taylor aware.      Kevan Matias DO 6/2/2025 10:09 PM

## 2025-06-03 NOTE — OB LABOR/OXYTOCIN SAFETY PROGRESS
Labor Progress Note - Reddy Garcia 40 y.o. female MRN: [id number]    Unit/Bed#: -01 Encounter: [id number]    Dose (ta-units/min) Oxytocin: 6 ta-units/min  Contraction Frequency (minutes): DESIRAE  Contraction Intensity: Mild/Moderate  Uterine Activity Characteristics: Irritability  Cervical Dilation: 5        Cervical Effacement: 80  Fetal Station: -1  Baseline Rate (FHR): 120 bpm  Fetal Heart Rate (FHT): 125 BPM  FHR Category: II               Vital Signs:   Vitals:    06/02/25 2115   BP: 116/67   Pulse:    Resp:    Temp:    SpO2:        Notes/comments:   FHT notable for variable decelerations, overall reassuring with moderate variability. SVE unchanged. Pitocin halved to 4 milliunits/min, and patient repositioned. Will continue to monitor FHT closely and consider amnioinfusion if variable decelerations persist.     Shania Guzmán MD 6/2/2025 9:33 PM

## 2025-06-03 NOTE — QUICK NOTE
Pt evaluated at 0021 for hyptension with BP 75/49 mmHg on left upper extremity, pulse 75 bpm  Persistent nausea since delivery. Feeling faint.     Repeat BP on /55 mmHg, BP cuff size adjusted, pulse remains in 70s  SPO2 96% on room air      General: Well appearing, no acute distress  Respiratory: Unlabored breathing, CTA bilaterally, no wheezing appreciated  Cardiovascular: Regular rate.no murmuirs appreciate  Abdomen: fundus firm  : no vaginal hematomas palpated, lower uterine segment firm, minimal bleeding  Extremities: Warm and well perfused.  Non tender.      Dr. Taylor at bedside    Plan:   - Next set of CBC/CMP, Magnesium levels obtained.   - Fibrinogen, PT, PTT collected as well  - Magnesium temporarily paused at this time  - continue to monitor vitals closely  - POCT blood glucose 107, apple juice given at this time, able to toleratre PO intake    Kevan Matias,   06/03/25  12:31 AM

## 2025-06-04 LAB
ALBUMIN SERPL BCG-MCNC: 3.1 G/DL (ref 3.5–5)
ALP SERPL-CCNC: 103 U/L (ref 34–104)
ALT SERPL W P-5'-P-CCNC: 17 U/L (ref 7–52)
ANION GAP SERPL CALCULATED.3IONS-SCNC: 7 MMOL/L (ref 4–13)
AST SERPL W P-5'-P-CCNC: 27 U/L (ref 13–39)
BILIRUB SERPL-MCNC: 0.17 MG/DL (ref 0.2–1)
BUN SERPL-MCNC: 14 MG/DL (ref 5–25)
CALCIUM ALBUM COR SERPL-MCNC: 7.8 MG/DL (ref 8.3–10.1)
CALCIUM SERPL-MCNC: 7.1 MG/DL (ref 8.4–10.2)
CHLORIDE SERPL-SCNC: 104 MMOL/L (ref 96–108)
CO2 SERPL-SCNC: 24 MMOL/L (ref 21–32)
CREAT SERPL-MCNC: 0.81 MG/DL (ref 0.6–1.3)
ERYTHROCYTE [DISTWIDTH] IN BLOOD BY AUTOMATED COUNT: 14.2 % (ref 11.6–15.1)
GFR SERPL CREATININE-BSD FRML MDRD: 91 ML/MIN/1.73SQ M
GLUCOSE SERPL-MCNC: 86 MG/DL (ref 65–140)
HCT VFR BLD AUTO: 34.2 % (ref 34.8–46.1)
HGB BLD-MCNC: 11.3 G/DL (ref 11.5–15.4)
MAGNESIUM SERPL-MCNC: 4.8 MG/DL (ref 1.9–2.7)
MCH RBC QN AUTO: 30.1 PG (ref 26.8–34.3)
MCHC RBC AUTO-ENTMCNC: 33 G/DL (ref 31.4–37.4)
MCV RBC AUTO: 91 FL (ref 82–98)
PLATELET # BLD AUTO: 163 THOUSANDS/UL (ref 149–390)
PMV BLD AUTO: 11.2 FL (ref 8.9–12.7)
POTASSIUM SERPL-SCNC: 4.4 MMOL/L (ref 3.5–5.3)
PROT SERPL-MCNC: 5.9 G/DL (ref 6.4–8.4)
RBC # BLD AUTO: 3.76 MILLION/UL (ref 3.81–5.12)
SODIUM SERPL-SCNC: 135 MMOL/L (ref 135–147)
WBC # BLD AUTO: 13.26 THOUSAND/UL (ref 4.31–10.16)

## 2025-06-04 PROCEDURE — 99024 POSTOP FOLLOW-UP VISIT: CPT | Performed by: STUDENT IN AN ORGANIZED HEALTH CARE EDUCATION/TRAINING PROGRAM

## 2025-06-04 RX ORDER — GABAPENTIN 100 MG/1
100 CAPSULE ORAL 3 TIMES DAILY
Status: DISCONTINUED | OUTPATIENT
Start: 2025-06-04 | End: 2025-06-05 | Stop reason: HOSPADM

## 2025-06-04 RX ORDER — ACETAMINOPHEN 325 MG/1
975 TABLET ORAL EVERY 6 HOURS SCHEDULED
Status: DISCONTINUED | OUTPATIENT
Start: 2025-06-05 | End: 2025-06-05 | Stop reason: HOSPADM

## 2025-06-04 RX ORDER — GABAPENTIN 100 MG/1
100 CAPSULE ORAL 3 TIMES DAILY
Qty: 30 CAPSULE | Refills: 1 | Status: SHIPPED | OUTPATIENT
Start: 2025-06-04 | End: 2025-06-05

## 2025-06-04 RX ORDER — BENZOCAINE/MENTHOL 6 MG-10 MG
1 LOZENGE MUCOUS MEMBRANE DAILY PRN
Start: 2025-06-04 | End: 2025-06-05

## 2025-06-04 RX ORDER — IBUPROFEN 400 MG/1
800 TABLET, FILM COATED ORAL EVERY 6 HOURS SCHEDULED
Status: DISCONTINUED | OUTPATIENT
Start: 2025-06-04 | End: 2025-06-05 | Stop reason: HOSPADM

## 2025-06-04 RX ORDER — GABAPENTIN 100 MG/1
100 CAPSULE ORAL 3 TIMES DAILY
Qty: 30 CAPSULE | Refills: 1 | Status: SHIPPED | OUTPATIENT
Start: 2025-06-04 | End: 2025-06-04

## 2025-06-04 RX ORDER — LIDOCAINE 50 MG/G
2 PATCH TOPICAL DAILY
Qty: 30 PATCH | Refills: 1 | Status: SHIPPED | OUTPATIENT
Start: 2025-06-05 | End: 2025-06-05

## 2025-06-04 RX ORDER — POLYETHYLENE GLYCOL 3350 17 G/17G
17 POWDER, FOR SOLUTION ORAL ONCE
Status: COMPLETED | OUTPATIENT
Start: 2025-06-04 | End: 2025-06-04

## 2025-06-04 RX ORDER — HYDROCORTISONE 25 MG/G
CREAM TOPICAL 4 TIMES DAILY PRN
Status: DISCONTINUED | OUTPATIENT
Start: 2025-06-04 | End: 2025-06-04

## 2025-06-04 RX ORDER — IBUPROFEN 800 MG/1
800 TABLET, FILM COATED ORAL EVERY 6 HOURS SCHEDULED
Qty: 120 TABLET | Refills: 1 | Status: SHIPPED | OUTPATIENT
Start: 2025-06-04 | End: 2025-06-23

## 2025-06-04 RX ORDER — DOCUSATE SODIUM 100 MG/1
100 CAPSULE, LIQUID FILLED ORAL 2 TIMES DAILY
Start: 2025-06-04 | End: 2025-06-05

## 2025-06-04 RX ORDER — SODIUM CHLORIDE 9 MG/ML
75 INJECTION, SOLUTION INTRAVENOUS CONTINUOUS
Status: DISPENSED | OUTPATIENT
Start: 2025-06-04 | End: 2025-06-05

## 2025-06-04 RX ORDER — HYDROCORTISONE 25 MG/G
CREAM TOPICAL 3 TIMES DAILY
Status: DISCONTINUED | OUTPATIENT
Start: 2025-06-04 | End: 2025-06-05 | Stop reason: HOSPADM

## 2025-06-04 RX ORDER — LIDOCAINE 50 MG/G
2 PATCH TOPICAL DAILY
Status: DISCONTINUED | OUTPATIENT
Start: 2025-06-04 | End: 2025-06-04

## 2025-06-04 RX ORDER — BISACODYL 10 MG
10 SUPPOSITORY, RECTAL RECTAL DAILY
Qty: 30 SUPPOSITORY | Refills: 1 | Status: SHIPPED | OUTPATIENT
Start: 2025-06-05 | End: 2025-06-23

## 2025-06-04 RX ORDER — NIFEDIPINE 30 MG
30 TABLET, EXTENDED RELEASE ORAL DAILY
Qty: 30 TABLET | Refills: 0 | Status: SHIPPED | OUTPATIENT
Start: 2025-06-04 | End: 2025-06-05

## 2025-06-04 RX ORDER — CALCIUM CARBONATE 500 MG/1
1000 TABLET, CHEWABLE ORAL DAILY PRN
Start: 2025-06-04 | End: 2025-06-05

## 2025-06-04 RX ORDER — BISACODYL 10 MG
10 SUPPOSITORY, RECTAL RECTAL DAILY
Status: DISCONTINUED | OUTPATIENT
Start: 2025-06-04 | End: 2025-06-04

## 2025-06-04 RX ADMIN — DOXYLAMINE SUCCINATE 12.5 MG: 25 TABLET ORAL at 21:16

## 2025-06-04 RX ADMIN — GABAPENTIN 100 MG: 100 CAPSULE ORAL at 09:00

## 2025-06-04 RX ADMIN — IBUPROFEN 800 MG: 400 TABLET, FILM COATED ORAL at 21:56

## 2025-06-04 RX ADMIN — NIFEDIPINE 30 MG: 30 TABLET, FILM COATED, EXTENDED RELEASE ORAL at 09:00

## 2025-06-04 RX ADMIN — OXYCODONE HYDROCHLORIDE 10 MG: 10 TABLET ORAL at 09:13

## 2025-06-04 RX ADMIN — ACETAMINOPHEN 975 MG: 325 TABLET ORAL at 10:36

## 2025-06-04 RX ADMIN — DOCUSATE SODIUM 100 MG: 100 CAPSULE, LIQUID FILLED ORAL at 09:00

## 2025-06-04 RX ADMIN — OXYCODONE HYDROCHLORIDE 5 MG: 10 TABLET ORAL at 04:59

## 2025-06-04 RX ADMIN — GABAPENTIN 100 MG: 100 CAPSULE ORAL at 21:16

## 2025-06-04 RX ADMIN — IBUPROFEN 600 MG: 600 TABLET ORAL at 03:27

## 2025-06-04 RX ADMIN — OXYCODONE HYDROCHLORIDE 5 MG: 10 TABLET ORAL at 21:15

## 2025-06-04 RX ADMIN — DOCUSATE SODIUM 100 MG: 100 CAPSULE, LIQUID FILLED ORAL at 18:09

## 2025-06-04 RX ADMIN — GABAPENTIN 100 MG: 100 CAPSULE ORAL at 16:40

## 2025-06-04 RX ADMIN — IBUPROFEN 800 MG: 400 TABLET, FILM COATED ORAL at 08:59

## 2025-06-04 RX ADMIN — ACETAMINOPHEN 975 MG: 325 TABLET ORAL at 16:40

## 2025-06-04 RX ADMIN — POLYETHYLENE GLYCOL 3350 17 G: 17 POWDER, FOR SOLUTION ORAL at 09:02

## 2025-06-04 RX ADMIN — IBUPROFEN 800 MG: 400 TABLET, FILM COATED ORAL at 16:40

## 2025-06-04 NOTE — ASSESSMENT & PLAN NOTE
- Originally with gHTN diagnosis; met criteria for preeclampsia with severe features due to the sustained severe range BP's  - Monitor BP's and signs/symptoms of worsening preeclampsia  - Mag decreased from 2->1 held overnight for dizziness, restarted in early AM  - 6/1: Mag @2028, lab 20  - 6/2: lab 40, 20 + Procardia 30mg; mag d/c'd  - 6/3-present: Procardia 30mg

## 2025-06-04 NOTE — QUICK NOTE
Patient's pain remains poorly controlled. Plan to keep overnight for monitoring. Discussed w/Dr Becerra     no

## 2025-06-04 NOTE — ASSESSMENT & PLAN NOTE
- Originally with gHTN diagnosis; met criteria for preeclampsia with severe features due to the sustained severe range BP's  - Monitor BP's and signs/symptoms of worsening preeclampsia  - Mag decreased from 2 -> 1 held overnight for dizziness, restarted in early AM  - 6/1: Mag @2028, lab 20  - 6/2: lab 40, 20 + Procardia 30mg; mag d/c'd  - 6/3-present: Procardia 30mg

## 2025-06-04 NOTE — PLAN OF CARE
Problem: PAIN - ADULT  Goal: Verbalizes/displays adequate comfort level or baseline comfort level  Description: Interventions:  - Encourage patient to monitor pain and request assistance  - Assess pain using appropriate pain scale  - Administer analgesics as ordered based on type and severity of pain and evaluate response  - Implement non-pharmacological measures as appropriate and evaluate response  - Consider cultural and social influences on pain and pain management  - Notify physician/advanced practitioner if interventions unsuccessful or patient reports new pain  - Educate patient/family on pain management process including their role and importance of  reporting pain   - Provide non-pharmacologic/complimentary pain relief interventions  Outcome: Progressing     Problem: INFECTION - ADULT  Goal: Absence or prevention of progression during hospitalization  Description: INTERVENTIONS:  - Assess and monitor for signs and symptoms of infection  - Monitor lab/diagnostic results  - Monitor all insertion sites, i.e. indwelling lines, tubes, and drains  - Monitor endotracheal if appropriate and nasal secretions for changes in amount and color  - [place] appropriate cooling/warming therapies per order  - Administer medications as ordered  - Instruct and encourage patient and family to use good hand hygiene technique  - Identify and instruct in appropriate isolation precautions for identified infection/condition  Outcome: Progressing  Goal: Absence of fever/infection during neutropenic period  Description: INTERVENTIONS:  - Monitor WBC  - Perform strict hand hygiene  - Limit to healthy visitors only  - No plants, dried, fresh or silk flowers with stephens in patient room  Outcome: Progressing     Problem: SAFETY ADULT  Goal: Patient will remain free of falls  Description: INTERVENTIONS:  - Educate patient/family on patient safety including physical limitations  - Instruct patient to call for assistance with activity   -  Consider consulting OT/PT to assist with strengthening/mobility based on AM PAC & JH-HLM score  - Consult OT/PT to assist with strengthening/mobility   - Keep Call bell within reach  - Keep bed low and locked with side rails adjusted as appropriate  - Keep care items and personal belongings within reach  - Initiate and maintain comfort rounds  - Make Fall Risk Sign visible to staff  - Apply yellow socks and bracelet for high fall risk patients  - Consider moving patient to room near nurses station  Outcome: Progressing  Goal: Maintain or return to baseline ADL function  Description: INTERVENTIONS:  -  Assess patient's ability to carry out ADLs; assess patient's baseline for ADL function and identify physical deficits which impact ability to perform ADLs (bathing, care of mouth/teeth, toileting, grooming, dressing, etc.)  - Assess/evaluate cause of self-care deficits   - Assess range of motion  - Assess patient's mobility; develop plan if impaired  - Assess patient's need for assistive devices and provide as appropriate  - Encourage maximum independence but intervene and supervise when necessary  - Involve family in performance of ADLs  - Assess for home care needs following discharge   - Consider OT consult to assist with ADL evaluation and planning for discharge  - Provide patient education as appropriate  - Monitor functional capacity and physical performance, use of AM PAC & JH-HLM   - Monitor gait, balance and fatigue with ambulation    Outcome: Progressing  Goal: Maintains/Returns to pre admission functional level  Description: INTERVENTIONS:  - Perform AM-PAC 6 Click Basic Mobility/ Daily Activity assessment daily.  - Set and communicate daily mobility goal to care team and patient/family/caregiver.   - Collaborate with rehabilitation services on mobility goals if consulted  - Out of bed for toileting  - Record patient progress and toleration of activity level   Outcome: Progressing     Problem: DISCHARGE  PLANNING  Goal: Discharge to home or other facility with appropriate resources  Description: INTERVENTIONS:  - Identify barriers to discharge w/patient and caregiver  - Arrange for needed discharge resources and transportation as appropriate  - Identify discharge learning needs (meds, wound care, etc.)  - Arrange for interpretive services to assist at discharge as needed  - Refer to Case Management Department for coordinating discharge planning if the patient needs post-hospital services based on physician/advanced practitioner order or complex needs related to functional status, cognitive ability, or social support system  Outcome: Progressing     Problem: Knowledge Deficit  Goal: Patient/family/caregiver demonstrates understanding of disease process, treatment plan, medications, and discharge instructions  Description: Complete learning assessment and assess knowledge base.  Interventions:  - Provide teaching at level of understanding  - Provide teaching via preferred learning methods  Outcome: Progressing  Goal: Verbalizes understanding of labor plan  Description: Assess patient/family/caregiver's baseline knowledge level and ability to understand information.  Provide education via patient/family/caregiver's preferred learning method at appropriate level of understanding.     1. Provide teaching at level of understanding.  2. Provide teaching via preferred learning method(s).  Outcome: Progressing     Problem: Labor & Delivery  Goal: Manages discomfort  Description: Assess and monitor for signs and symptoms of discomfort.  Assess patient's pain level regularly and per hospital policy.  Administer medications as ordered. Support use of nonpharmacological methods to help control pain such as distraction, imagery, relaxation, and application of heat and cold.  Collaborate with interdisciplinary team and patient to determine appropriate pain management plan.    1. Include patient in decisions related to comfort.  2.  Offer non-pharmacological pain management interventions.  3. Report ineffective pain management to physician.  Outcome: Progressing  Goal: Patient vital signs are stable  Description: 1. Assess vital signs - vaginal delivery.  Outcome: Progressing

## 2025-06-04 NOTE — LACTATION NOTE
This note was copied from a baby's chart.  CONSULT - LACTATION  Baby Girl (Anyul) John Garcia 2 days female MRN: [id number]    [facility] NURSERY Room / Bed: (N)/(N) Encounter: [id number]    Maternal Information     MOTHER:  Reddy Garcia  Maternal Age: 40 y.o.  OB History: # 1 - Date: 25, Sex: Female, Weight: 2765 g (6 lb 1.5 oz), GA: 37w0d, Type: Vaginal, Forceps, Apgar1: 8, Apgar5: 10, Living: Living, Birth Comments: None   Previous breast reduction surgery? No    Lactation history:   Has patient previously breast fed: No   How long had patient previously breast fed:     Previous breast feeding complications:       Past Surgical History:   Procedure Laterality Date    INGUINAL HERNIA REPAIR         Birth information:  YOB: 2024   Time of birth: 11:52 PM   Sex: female   Delivery type: Vaginal, Forceps   Birth Weight: 2765 g (6 lb 1.5 oz)   Percent of Weight Change: -4%     Gestational Age: 37w0d   [unfilled]    Reason for Consult:    Reason for Consult: Discharge (ext) - 20 min, Latch Assess (ext) - 20 min    Risk Factors:         Breast and nipple assessment:   Breasts/Nipples  Left Breast: Soft (large breast)  Right Breast: Soft (large breast)  Left Nipple: Blisters, Sore, Everted  Right Nipple: Blisters, Sore, Everted  Intervention: Hand expression  Breastfeeding Status: Yes  Breastfeeding Progress: Not yet established    OB Lactation Tools:         Breast Pump:    Breast Pump  Pump: Personal (Mom Jori)      [place] Assessment: normal assessment    Feeding assessment: feeding well  LATCH:  Latch: Grasps breast, tongue down, lips flanged, rhythmic sucking   Audible Swallowing: Spontaneous and intermittent (24 hours old)   Type of Nipple: Everted (After stimulation)   Comfort (Breast/Nipple): Filling, red/small blisters/bruises, mild/moderate discomfort   Hold (Positioning): Partial assist, teach one side, mother does other, staff  holds   LATCH Score: 8         Feeding recommendations:  breast feed on demand  DC Consult: Met with mom to discuss feeding plan. Mom reports baby is breastfeeding okay. She is working on different positions so she is comfortable and does not have the pressure on her bottom. Mom would like to try side lying position for feeding.     Baby cueing in bassinet. Education given on hunger cues. Positioned mom in side lying position on left breast. LC brought baby over. Demonstration with teach back of side lying for deep latch. Baby latched deeply, actively sucking with swallows. Reviewed proper position and alignment for deep latch. Mom verbalized understanding. Education given to mom on different positions, referred to RSB booklet. Mom transitioned baby to right side in side lying. Mom latched baby independently. Baby latched deeply. Baby actively sucking with swallows.     Reviewed with mom cluster feedings and what to expect. Reviewed initial engorgement and ways to alleviate. Enc to call for further assistance.     Feeding plan:  Family is encouraged to breastfeed on demand, every 2-3 hours or when baby showing early feeding cues. Reviewed to offer both breasts during a feed.  Discussed the importance of ensuring that baby feeds 8-12x in 24 hours and not waiting over 3 hours to feed. Educated to watch baby for hunger and fullness cues.    Education:  Provided demonstration, education and support of deep latch to breast by placing the nipple to the nose, dragging down to chin to achieve a wide latch. Bring baby to the breast, not breast to baby. Move your shoulders down and away from your ears. Look for ear, shoulder, hip alignment. Baby's upper and lower lip should be flanged on the breast.    Discussed 2nd night syndrome and ways to calm infant. Hand out given. Information on hand expression given. Discussed benefits of knowing how to manually express breast including stimulating milk supply, softening nipple for  latch and evacuating breast in the event of engorgement.    Discussed s/s engorgement, blocked milk ducts, and mastitis. Discussed how to remedy at home and when to contact physician. Reviewed engorgement and ways to reduce symptoms. Use a warmth on breasts with massage prior to feeding / pumping. Use massage during pumping over full ducts. Used cold, compression on breasts after feeding/pumping session. Ideas are rice in a sock. Place one in the freezer for cool and one in the microwave for warmth. Referred to discharge book / engorgement page.    Encouraged parents to call for assistance, questions, and concerns about breastfeeding.  Extension provided.  Farhana Ramirez MA, IBCLC 6/4/2025 12:40 PM

## 2025-06-04 NOTE — DISCHARGE INSTR - AVS FIRST PAGE
Self Care After Delivery   Postpartum Care Resources  [facility] Baby & Me Support Center:  Baby and Me is a support center designed to promote the physical and mental health of families. [facility] delivers personalized and compassionate care that includes prenatal and  classes, Support Groups, Lactation Support Services, Post-Partum Emotional Wellness Services, Activities/Exercise    [facility] [place] [address]  [phone]  [email or web address]    [facility] Physical Therapy for Pelvic Health  Physical Therapy at [facility]’s team of trained female therapists offer a wide variety of services designed to address the special healthcare needs of women. Our specially trained clinicians work with you to address your concerns and come beside you to support and encourage you through the healing process. Our offices are designed to keep your sensitive treatments private at all times. We are here to ensure your comfort and mentor you through our pelvic floor therapy programs at your pace.  [email or web address]    The postpartum period  is the period of time from delivery to about 6 weeks. During this time you may experience many physical and emotional changes. It is important to understand what is normal and when you need to call your healthcare provider. It is also important to know how to care for yourself during this time.  Call your local emergency number (911 in the [place]) for any of the following:   You see or hear things that are not there, or have thoughts of harming yourself.  You soak through 1 pad in 15 minutes, have blurry vision, clammy or pale skin, and feel faint.  You faint or lose consciousness.  You have trouble breathing.  You cough up blood.    Seek care immediately if:   Your heart is beating faster than usual.  You have a  bad headache or changes in your vision.  Your episiotomy or  incision is red, swollen, bleeding, or draining pus.  You have severe abdominal pain.    Call your doctor or obstetrician if:   Your leg is painful, red, and larger than usual.  You soak through 1 or more pads in an hour, or pass blood clots larger than a quarter from your vagina.  You have a fever.  You have new or worsening pain in your abdomen or vagina.  You continue to have depression 1 to 2 weeks after you deliver.  You have trouble sleeping.  You have foul-smelling discharge from your vagina.  You have pain or burning when you urinate.  You do not have a bowel movement for 3 days or more.  You have nausea or are vomiting.  You have hard lumps or red streaks over your breasts.  You have cracked nipples or bleed from your nipples.  You have questions or concerns about your condition or care.    Physical changes:  The following are normal changes after you give birth:  Pain in the area between your anus and vagina  Breast pain  Constipation or hemorrhoids  Hot or cold flashes  Vaginal bleeding or discharge  Mild to moderate abdominal cramping  Difficulty controlling bowel movements or urine    Emotional changes:  A drop in hormone levels after you deliver may cause changes in your emotions. You may feel irritable, sad, or anxious. You may cry easily or for no reason. You may also feel depressed. Depression that continues can be a sign of postpartum depression, a condition that can be treated. Treatment may include talk therapy, medicines, or both. Healthcare providers will ask how you are feeling and if you have any depression. These talks can happen during appointments for your medical care. Providers can help you find ways to care for yourself. Talk to your providers about the following:  When emotional changes or depression started, and if it is getting worse over time  Problems you are having with daily activities or sleep  If anything  makes you feel worse, or makes you feel better  Support you have from friends, family, or others    Breast care for non-breastfeeding mothers:  Milk will fill your breasts naturally. Do the following to help stop your milk from filling your breasts and causing pain:  Wear a bra with support at all times.  A sports bra or a tight-fitting bra will help stop your milk from coming in.  Apply ice on each breast for 15 to 20 minutes every hour or as directed.  Use an ice pack, or put crushed ice in a plastic bag. Cover it with a towel before you apply it to your breast. Ice helps your milk ducts shrink.  Keep your breasts away from warm water.  Warm water will make it easier for milk to fill your breasts. Stand with your breasts away from warm water in the shower.  Limit how much you touch your breasts.  This will prevent them from filling with milk.    Perineum care:  Your perineum is the area between your rectum and vagina. It is normal to have swelling and pain in this area after you give birth. If you had an episiotomy, your healthcare provider may give you special instructions.  Clean your perineum after you use the bathroom.  This may prevent infection and help with healing. Use a spray bottle with warm water to clean your perineum. You may also gently spray warm water against your perineum when you urinate. Always wipe front to back.  Take a sitz bath as directed.  A sitz bath may help relieve swelling and pain. Fill your bath tub or bucket with water up to your hips and sit in the water. Use cold water for 2 days after you deliver. Then use warm water. Ask your healthcare provider for more information about a sitz bath.  Apply ice packs for the first 24 hours or as directed.  Use a plastic glove filled with ice or buy an ice pack. Wrap the ice pack or plastic glove in a small towel or wash cloth. Place the ice pack on your perineum for 20 minutes at a time.  Sit on a donut-shaped pillow.  This may relieve pressure  on your perineum when you sit.  Use wipes that contain medicine or take pills as directed.  Your healthcare provider may tell you to use witch hazel pads. You can place witch hazel pads in the refrigerator before you apply them to your perineum. Your provider may also tell you to take NSAIDs. Ask him or her how often to take pills or use the wipes.  Do not go swimming or take tub baths for 4 to 6 weeks or as directed.  This will help prevent an infection in your vagina or uterus.    Bowel and bladder care:  It may take 3 to 5 days to have a bowel movement after your delivery. You can do the following to prevent or manage constipation, and get control of your bowel or bladder:  Take stool softeners as directed.  A stool softener is medicine that will make your bowel movements softer. This may prevent or relieve constipation. A stool softener may also make bowel movements less painful.  Drink plenty of liquids.  Ask how much liquid to drink each day and which liquids are best for you. Liquids may help prevent constipation.  Eat foods high in fiber.  Examples include fruits, vegetables, grains, beans, and lentils. Ask your healthcare provider how much fiber you need each day. Fiber may prevent constipation.         Do Kegel exercises as directed.  Kegel exercises will help strengthen the muscles that control bowel movements and urination. Ask your healthcare provider for more information on Kegel exercises.  Apply cold compresses or medicine to hemorrhoids as directed.  This may relieve swelling and pain. Your healthcare provider may tell you to apply ice or wipes that contain medicine to your hemorrhoids. He or she may also tell you to use a sitz bath. Ask your provider for more information on how to manage hemorrhoids.    Nutrition:  Good nutrition is important in the postpartum period. It will help you return to a healthy weight, increase your energy levels, and prevent constipation. It will also help you get enough  nutrients and calories if you are going to breastfeed.  Eat a variety of healthy foods.  Healthy foods include fruits, vegetables, whole-grain breads, low-fat dairy products, beans, lean meats, and fish. You may need 500 to 700 extra calories each day if you breastfeed. You may also need extra protein.         Limit foods with added sugar and high amounts of fat.  These foods are high in calories and low in healthy nutrients. Read food labels so you know how much sugar and fat is in the food you want to eat.  Drink 8 to 10 glasses of water per day.  Water will help you make plenty of milk. It will also help prevent constipation. Drink a glass of water regularly.  Take vitamins as directed.  Ask your healthcare provider what vitamins you need.  Limit caffeine and alcohol if you are breastfeeding.  Caffeine and alcohol can get into your breast milk. Ask your healthcare provider if you can drink alcohol or caffeine.    Rest and sleep:  You may feel very tired in the postpartum period. Enough sleep will help you heal. The following may help you get sleep and rest:  Nap when you can.  Get rest during this time.  Limit visitors.  Many people may want to see you. Ask friends or family to visit on different days. This will give you time to rest.  Do not plan too much for one day.  Put off household chores so that you have time to rest. Gradually do more each day.  Ask for help from family, friends, or neighbors.  Ask them to help you with laundry, cleaning, or errands.     Exercise after delivery:  Wait until your healthcare provider says it is okay to exercise. Exercise can help you lose weight, increase your energy levels, and manage your mood. It can also prevent constipation and blood clots. Start with gentle exercises such as walking. Do more as you have more energy. You may need to avoid abdominal exercises for 1 to 2 weeks after you deliver. Talk to your healthcare provider about an exercise plan that is right for  you.       Sexual activity after delivery:   Do not have sex until your healthcare provider says it is okay. You may need to wait 4 to 6 weeks before you have sex. This may prevent infection and allow time to heal.  Your menstrual cycle may begin as soon as 3 weeks after you deliver. Your period may be delayed if you breastfeed. You can become pregnant before you get your first postpartum period. Talk to your healthcare provider about birth control that is right for you. Some types of birth control are not safe during breastfeeding.    For support and more information:  Join a support group for new mothers. Ask for help from family and friends with chores and errands.  Office of Women's Health,  Department of Health and Human Services  [address] [place] [address]  [address] [place] [address]  Phone: [phone]  Web Address: www.womenshealth.gov    [facility] Postpartum Care  [address] [place] [address]  Web Address: [email or web address]    Follow up with your doctor or obstetrician as directed:  You will need to follow up within 2 to 6 weeks of delivery. Write down your questions so you remember to ask them at your visits.    © Copyright [employer] 2022 Information is for End User's use only and may not be sold, redistributed or otherwise used for commercial purposes. All illustrations and images included in CareNotes® are the copyrighted property of [employer] or [employer]  The above information is an  only. It is not intended as medical advice for individual conditions or treatments. Talk to your doctor, nurse or pharmacist before following any medical regimen to see if it is safe and effective for you.      Breastfeeding   Benefits of breastfeeding  Are less likely to develop allergies  Have  increased protection against bacterial meningitis  Have fewer middle ear infections  Have fewer learning disabilities  Have fewer behavioral difficulties  Have higher IQ's  Have lower rates of respiratory illness  Have lower obesity  Are less likely to develop juvenile diabetes and some childhood cancers  Are less likely to die from Sudden Infant Death Syndrome  A healthier baby means fewer visits to the doctor and the pharmacy.  Breastfeeding is environmentally friendly. There is nothing to throw away.   Breastfeeding is free; formula can cost over $1000 for the first year of life.   There is less vaginal bleeding immediately after delivery and a faster return to your pre-pregnant size.  Mothers who breastfeed a lifetime total of 2 years have:  A 40% decreased risk of breast cancer   A decreased risk of ovarian cancer  Lower rates of osteoporosis, diabetes and heart disease.    Importance of exclusive breastfeeding  By providing the ideal food for the healthy growth and development of infants, exclusive  infants receive only breast milk.   Exclusive breastfeeding for the first 6 months is very important to improve an infant's health and reduce childhood illness.     Exclusive breastfeeding for the first 6 months  The American Academy of Pediatrics recommends exclusive breastfeeding for the first six months and continued breastfeeding with supplementation of solids for the next 6 months.     Early initiation of breastfeeding  Women who feed their infants within the first hour of birth is referred to as “early initiation of breastfeeding” and ensures that the  receives colostrum.   Colostrum is rich in antibodies and essential nutrients.     Rooming-In  May stabilize 's breathing and heart rate  Reduce crying  Improve ability for  to maintain temperature and blood sugar levels  Early stimulation of baby's immune system  Calming effects  Easier and faster bonding   Rooming-in promotes  getting to know your .  Rooming-in makes breastfeeding easier.  Women who room-in with their newborns make more milk and produce a good milk supply earlier.  Becomes easier to recognize baby's feeding cues  Infants have longer breastfeeding sessions.  Women who room-in with their newborns are more likely to exclusively breastfeed compared to women who are  from their newborns.   Skin-to-Skin  Skin to skin contact should happen regardless of a woman's feeding preference or the mode of delivery.  After the delivery of your baby, it's important that a healthy mother and her baby have uninterrupted skin-to-skin contact.  Skin to skin contact should occur immediately after birth for a least one-two hours and until after the first feeding for breastfeeding mothers.  Skin-to-skin contact means placing dried, unclothes newborns on their mother's bare chest, with warm blankets covering the baby's back.  All routine procedures such as maternal and  assessments can take place during skin-to-skin contact or can be delayed until after the sensitive period immediately after birth.     Postpartum Birth Control Options   Almost half of all pregnancies are not planned, which can sometimes be a happy surprise, but other times can be a stressful situation for a woman or family. Birth control can be an empowering tool for women to take control of their family planning so that they can have healthy pregnancies in the future if and when they want. We recommend at least 6 months, ideally 18 months, between delivery to conception of the next pregnancy.     Implant  The birth control implant, brand name Nexplanon, is a small matchstick sized device that is placed under the skin of your upper arm. This releases a small amount of hormone daily that prevents pregnancy for up to 3 years of use. You can have the implant inserted after delivery prior to being discharged from the hospital or at any time in your OBGYN office  depending on insurance coverage.  Pros  - Less than 1 out of 100 women will get pregnant in 1 year using this method  - Once it is placed you do not have to do anything else to prevent pregnancy, like remembering to take a daily pill   - Once removed, the implant is immediately reversible and you will return to your normal ability to get pregnant  - While there is a theoretical risk of low milk supply when these methods are started right after birth, there is no research to support this theory. The implant is considered safe for use in breastfeeding mothers  Cons  - Most common side effects of the implant include changes in your period- which can be heavier, lighter, spotting in between periods or stopping your periods all together- it depends on the person  - Other side effects include headaches and acne    Intrauterine Device (IUD)  An intrauterine device (IUD) is a small T shaped device that is placed into the uterus. The IUD comes in 2 forms: hormonal and non-hormonal  - The non-hormonal IUD or copper IUD has been approved up to 10 years of use   - The hormonal IUD comes in forms that are approved for 3-6 years of use    This device can be placed immediately following delivery, 4 weeks after delivery or any time following that in your OBGYN office depending on insurance coverage.  Pros  - Less than 1 out of 100 women will get pregnant in 1 year using this method  - Once it is placed you do not have to do anything else to not get pregnant, like remembering to take a daily pill  - Many women have lighter periods or no periods at all on the hormonal IUD.   - Once removed, the IUD is immediately reversible and you will return to your normal ability to get pregnant  - While there is a theoretical risk of low milk supply when these methods are started right after birth, there is no research to support this theory. The IUD is considered safe for use in breastfeeding mothers  Cons  - Possibility of IUD falling out of  "the uterus   - Occurs in approximately 2-5% of women   - If your IUD is placed immediately after delivery the risk of it falling out is slightly higher at 10-27%  - Hormonal IUD can result in irregular spotting in the first 3-6 months that usually normalizes  - Non-hormonal IUD may cause heavier and more crampy periods in the first few months of use, which tends to improve after the first year of use  - Other side effects of the hormonal IUD include headaches and acne    Birth Control Shot  The birth control shot, brand name Depo Provera, is given every 3 months to prevent pregnancy. This injection can be received before being discharged from the hospital, following the delivery of your child or at any time at your OBGYN office.   Pros  - About 4 out of 100 women will get pregnant in 1 year using this method  - You do not need to remember to take a daily pill but you do need to remember to get the injection at your OBGYN office every 3 months  - While there is a theoretical risk of low milk supply when this birth control are started right after birth, there is no research to support this theory. The birth control shot is considered safe for use in breastfeeding mothers.  Cons  - Most common side effect of the birth control injection is irregular bleeding; this usually normalizes after 1 year of use  - After your last shot, sometimes your ability to get pregnant can be delayed by as long as 1 year  - Weight gain, headaches, and mood changes are side effects that some women may experience  - Decreased bone mineral density can occur with long-term use; however, this is reversible once you stop using the injection and does not increase your lifetime risk of osteoporosis    Progesterone-Only Pills  The \"mini pill\" is a pill that must be taken once daily at the same time each day. This can be started as soon as you go home from the hospital after delivery, or at any time.  Pros  - About 8 out of 100 women will get " pregnant in 1 year using this method  - You have complete control of when to start and stop taking the pill  - Many women have lighter periods or no periods at all while taking this pill  - While there is a theoretical risk of low milk supply when this birth control are started right after birth, there is no research to support this theory. The progesterone only pill is considered safe for use in breastfeeding mothers  Cons  - You must take the pill at the same time every day. If you miss one day, you need another form of pregnancy prevention for at least 7 days  - Some people have irregular, unscheduled bleeding while taking the pill  - Other side effects include headaches and acne    Combined Hormonal Contraceptives (Pill, Patch, Ring)  These methods all contain 2 hormones, estrogen and progesterone. The pill is taken once daily, the patch is worn on the skin and changed once weekly, the ring is placed vaginally and changed once monthly. These methods can be started 4-6 weeks after giving birth.  Pros  - About 8 out of 100 women will get pregnant in 1 year using this method  - You have complete control of when to start and stop using these methods  - Many women have lighter, less painful, regular periods while taking this pill  Cons  - Recommend against use while breastfeeding as estrogen can decrease milk supply  - You must take the pill at the same time every day. If you miss one day, you need another form of pregnancy prevention for at least 7 days    Male/Female condoms, withdrawal, fertility awareness  These are methods that you either buy over the counter or do yourself. Condoms and withdrawal must be used with every sexual act. Fertility awareness must be assessed every day. Condoms and withdrawal can be used immediately after delivery once your doctor has performed your postpartum office exam. Fertility awareness can be started after return of your period.  Pros  - You have complete control of when to  start and stop using these methods  - No issue with use and breastfeeding  Cons  - About 15-25 out of 100 women will get pregnant in 1 year using this method  - Condoms and withdrawal must be used with every sexual act  - Fertility awareness must be assessed every day

## 2025-06-04 NOTE — PROGRESS NOTES
Progress Note - OB/GYN   Name: Reddy Garcia 40 y.o. female I MRN: [id number]  Unit/Bed#: -01 I Date of Admission: 6/1/2025   Date of Service: 6/4/2025 I Hospital Day: 3     Assessment & Plan  Forceps delivery  Recovering well   Routine postpartum care, encourage ambulation, encourage familial bonding  Lactation support for breast/bottle feeding as needed  FEN: Tolerating regular diet  Pain: Tylenol around the clock, oxycodone, dilaudid,  if needed  Appropriate bowel and bladder function     Rh negative state in antepartum period, third trimester  - S/p Rhogam at 28 weeks on 4/4/25.  - Rhogam pp baby O Pos, Mom O neg  Severe preeclampsia  - Originally with gHTN diagnosis; met criteria for preeclampsia with severe features due to the sustained severe range BP's  - Monitor BP's and signs/symptoms of worsening preeclampsia  - Mag decreased from 2->1 held overnight for dizziness, restarted in early AM  - 6/1: Mag @2028, lab 20  - 6/2: lab 40, 20 + Procardia 30mg; mag d/c'd  - 6/3-present: Procardia 30mg    OB Post-Partum Progress Note  Subjective   Reddy Garcia is a patient of: [facility]. She is PPD#2 s/p Forceps Assisted Vaginal Delivery. Recovering and slowly getting her pain under control as we are adding back NSAIDs    Disposition    - Anticipate discharge home on PPD#2-3  Barriers to discharge: Blood Pressures and Pain     Ellen Escobar MD  OBGYN PGY-1  6/4/2025 5:50 AM    Subjective/Objective     Chief Complaint: Postpartum State     Subjective:    Reddy Garcia rates her pain 8/10, despite having dilaudid and oxycodone. She is currently voiding. She is ambulating. Patient is currently passing flatus and hasn't had a bowel movement. She is tolerating PO, and denies nausea or vomitting. She denies fever, chills, chest pain, shortness of breath, or calf tenderness. Lochia is minimal. She is Breastfeeding.     Objective :  Temp:  [97.5 °F (36.4 °C)-98.5 °F (36.9 °C)] 98.2  °F (36.8 °C)  HR:  [71-84] 71  BP: (114-144)/(67-83) 114/67  Resp:  [17-20] 18  SpO2:  [94 %-100 %] 97 %  O2 Device: None (Room air)    GEN: Reddy Garcia appears well, alert and oriented x 3, pleasant and cooperative   CARDIO: Regular Rate  RESP:  Non-labored breathing  ABDOMEN: soft, appropriate tenderness, firm fundus below umbilicus  EXTREMITIES: Non tender, trace edema appropriate for post-pregnancy, no erythema       Lab Results: I have reviewed the following results:  Lab Results   Component Value Date    WBC 13.26 (H) 06/03/2025    HGB 11.3 (L) 06/03/2025    HCT 34.2 (L) 06/03/2025    MCV 91 06/03/2025     06/03/2025

## 2025-06-04 NOTE — ASSESSMENT & PLAN NOTE
Recovering well   Routine postpartum care, encourage ambulation, encourage familial bonding  Lactation support for breast/bottle feeding as needed  FEN: Tolerating regular diet  Pain: Tylenol around the clock, oxycodone, dilaudid,  if needed  Appropriate bowel and bladder function

## 2025-06-05 VITALS
WEIGHT: 213.2 LBS | OXYGEN SATURATION: 98 % | HEIGHT: 64 IN | TEMPERATURE: 98.1 F | DIASTOLIC BLOOD PRESSURE: 70 MMHG | HEART RATE: 93 BPM | RESPIRATION RATE: 18 BRPM | BODY MASS INDEX: 36.4 KG/M2 | SYSTOLIC BLOOD PRESSURE: 139 MMHG

## 2025-06-05 LAB
BACTERIA UR QL AUTO: ABNORMAL /HPF
BILIRUB UR QL STRIP: NEGATIVE
CLARITY UR: ABNORMAL
COLOR UR: ABNORMAL
GLUCOSE UR STRIP-MCNC: NEGATIVE MG/DL
HGB UR QL STRIP.AUTO: ABNORMAL
KETONES UR STRIP-MCNC: NEGATIVE MG/DL
LEUKOCYTE ESTERASE UR QL STRIP: ABNORMAL
NITRITE UR QL STRIP: NEGATIVE
NON-SQ EPI CELLS URNS QL MICRO: ABNORMAL /HPF
PH UR STRIP.AUTO: 6 [PH]
PROT UR STRIP-MCNC: ABNORMAL MG/DL
RBC #/AREA URNS AUTO: ABNORMAL /HPF
SP GR UR STRIP.AUTO: 1.03 (ref 1–1.03)
UROBILINOGEN UR STRIP-ACNC: <2 MG/DL
WBC #/AREA URNS AUTO: ABNORMAL /HPF

## 2025-06-05 PROCEDURE — 99024 POSTOP FOLLOW-UP VISIT: CPT | Performed by: OBSTETRICS & GYNECOLOGY

## 2025-06-05 PROCEDURE — 87186 SC STD MICRODIL/AGAR DIL: CPT

## 2025-06-05 PROCEDURE — 87077 CULTURE AEROBIC IDENTIFY: CPT

## 2025-06-05 PROCEDURE — 81001 URINALYSIS AUTO W/SCOPE: CPT

## 2025-06-05 PROCEDURE — 87086 URINE CULTURE/COLONY COUNT: CPT

## 2025-06-05 PROCEDURE — NC001 PR NO CHARGE: Performed by: OBSTETRICS & GYNECOLOGY

## 2025-06-05 RX ORDER — FUROSEMIDE 20 MG/1
20 TABLET ORAL 2 TIMES DAILY
Qty: 5 TABLET | Refills: 0 | Status: SHIPPED | OUTPATIENT
Start: 2025-06-05 | End: 2025-06-23

## 2025-06-05 RX ORDER — DOCUSATE SODIUM 100 MG/1
100 CAPSULE, LIQUID FILLED ORAL 2 TIMES DAILY
Start: 2025-06-05

## 2025-06-05 RX ORDER — CALCIUM CARBONATE 500 MG/1
2 TABLET, CHEWABLE ORAL DAILY PRN
Qty: 30 TABLET | Refills: 1 | Status: CANCELLED | OUTPATIENT
Start: 2025-06-05

## 2025-06-05 RX ORDER — IBUPROFEN 600 MG/1
600 TABLET, FILM COATED ORAL EVERY 6 HOURS PRN
Qty: 30 TABLET | Refills: 0 | Status: SHIPPED | OUTPATIENT
Start: 2025-06-05 | End: 2025-06-23

## 2025-06-05 RX ORDER — NIFEDIPINE 30 MG
30 TABLET, EXTENDED RELEASE ORAL DAILY
Qty: 30 TABLET | Refills: 1 | Status: SHIPPED | OUTPATIENT
Start: 2025-06-05 | End: 2025-06-12

## 2025-06-05 RX ORDER — AMOXICILLIN 250 MG
1 CAPSULE ORAL
Status: DISCONTINUED | OUTPATIENT
Start: 2025-06-05 | End: 2025-06-05 | Stop reason: HOSPADM

## 2025-06-05 RX ORDER — OXYCODONE HYDROCHLORIDE 5 MG/1
5 TABLET ORAL EVERY 6 HOURS PRN
Qty: 10 TABLET | Refills: 0 | Status: CANCELLED | OUTPATIENT
Start: 2025-06-05 | End: 2025-06-15

## 2025-06-05 RX ORDER — ACETAMINOPHEN 325 MG/1
975 TABLET ORAL EVERY 6 HOURS SCHEDULED
Start: 2025-06-05 | End: 2025-06-23

## 2025-06-05 RX ORDER — HYDROCORTISONE 25 MG/G
CREAM TOPICAL 3 TIMES DAILY
Qty: 28 G | Refills: 1 | Status: CANCELLED | OUTPATIENT
Start: 2025-06-05

## 2025-06-05 RX ORDER — AMOXICILLIN 250 MG
1 CAPSULE ORAL
Qty: 30 TABLET | Refills: 0 | Status: CANCELLED | OUTPATIENT
Start: 2025-06-05

## 2025-06-05 RX ORDER — BENZOCAINE/MENTHOL 6 MG-10 MG
1 LOZENGE MUCOUS MEMBRANE DAILY PRN
Start: 2025-06-05 | End: 2025-06-23

## 2025-06-05 RX ADMIN — GABAPENTIN 100 MG: 100 CAPSULE ORAL at 10:00

## 2025-06-05 RX ADMIN — BENZOCAINE AND LEVOMENTHOL 1 APPLICATION: 200; 5 SPRAY TOPICAL at 00:02

## 2025-06-05 RX ADMIN — DOCUSATE SODIUM 100 MG: 100 CAPSULE, LIQUID FILLED ORAL at 10:00

## 2025-06-05 RX ADMIN — IBUPROFEN 800 MG: 400 TABLET, FILM COATED ORAL at 04:00

## 2025-06-05 RX ADMIN — BENZOCAINE AND LEVOMENTHOL 1 APPLICATION: 200; 5 SPRAY TOPICAL at 06:11

## 2025-06-05 RX ADMIN — NIFEDIPINE 30 MG: 30 TABLET, FILM COATED, EXTENDED RELEASE ORAL at 10:00

## 2025-06-05 RX ADMIN — ACETAMINOPHEN 975 MG: 325 TABLET ORAL at 00:02

## 2025-06-05 RX ADMIN — IBUPROFEN 800 MG: 400 TABLET, FILM COATED ORAL at 10:00

## 2025-06-05 RX ADMIN — ACETAMINOPHEN 975 MG: 325 TABLET ORAL at 06:10

## 2025-06-05 NOTE — PLAN OF CARE
Problem: PAIN - ADULT  Goal: Verbalizes/displays adequate comfort level or baseline comfort level  Description: Interventions:  - Encourage patient to monitor pain and request assistance  - Assess pain using appropriate pain scale  - Administer analgesics as ordered based on type and severity of pain and evaluate response  - Implement non-pharmacological measures as appropriate and evaluate response  - Consider cultural and social influences on pain and pain management  - Notify physician/advanced practitioner if interventions unsuccessful or patient reports new pain  - Educate patient/family on pain management process including their role and importance of  reporting pain   - Provide non-pharmacologic/complimentary pain relief interventions  Outcome: Progressing     Problem: INFECTION - ADULT  Goal: Absence or prevention of progression during hospitalization  Description: INTERVENTIONS:  - Assess and monitor for signs and symptoms of infection  - Monitor lab/diagnostic results  - Monitor all insertion sites, i.e. indwelling lines, tubes, and drains  - Monitor endotracheal if appropriate and nasal secretions for changes in amount and color  - [place] appropriate cooling/warming therapies per order  - Administer medications as ordered  - Instruct and encourage patient and family to use good hand hygiene technique  - Identify and instruct in appropriate isolation precautions for identified infection/condition  Outcome: Progressing  Goal: Absence of fever/infection during neutropenic period  Description: INTERVENTIONS:  - Monitor WBC  - Perform strict hand hygiene  - Limit to healthy visitors only  - No plants, dried, fresh or silk flowers with stephens in patient room  Outcome: Progressing     Problem: SAFETY ADULT  Goal: Patient will remain free of falls  Description: INTERVENTIONS:  - Educate patient/family on patient safety including physical limitations  - Instruct patient to call for assistance with activity   -  Consider consulting OT/PT to assist with strengthening/mobility based on AM PAC & JH-HLM score  - Consult OT/PT to assist with strengthening/mobility   - Keep Call bell within reach  - Keep bed low and locked with side rails adjusted as appropriate  - Keep care items and personal belongings within reach  - Initiate and maintain comfort rounds  - Make Fall Risk Sign visible to staff  - Apply yellow socks and bracelet for high fall risk patients  - Consider moving patient to room near nurses station  Outcome: Progressing  Goal: Maintain or return to baseline ADL function  Description: INTERVENTIONS:  -  Assess patient's ability to carry out ADLs; assess patient's baseline for ADL function and identify physical deficits which impact ability to perform ADLs (bathing, care of mouth/teeth, toileting, grooming, dressing, etc.)  - Assess/evaluate cause of self-care deficits   - Assess range of motion  - Assess patient's mobility; develop plan if impaired  - Assess patient's need for assistive devices and provide as appropriate  - Encourage maximum independence but intervene and supervise when necessary  - Involve family in performance of ADLs  - Assess for home care needs following discharge   - Consider OT consult to assist with ADL evaluation and planning for discharge  - Provide patient education as appropriate  - Monitor functional capacity and physical performance, use of AM PAC & JH-HLM   - Monitor gait, balance and fatigue with ambulation    Outcome: Progressing  Goal: Maintains/Returns to pre admission functional level  Description: INTERVENTIONS:  - Perform AM-PAC 6 Click Basic Mobility/ Daily Activity assessment daily.  - Set and communicate daily mobility goal to care team and patient/family/caregiver.   - Collaborate with rehabilitation services on mobility goals if consulted  - Out of bed for toileting  - Record patient progress and toleration of activity level   Outcome: Progressing     Problem: DISCHARGE  PLANNING  Goal: Discharge to home or other facility with appropriate resources  Description: INTERVENTIONS:  - Identify barriers to discharge w/patient and caregiver  - Arrange for needed discharge resources and transportation as appropriate  - Identify discharge learning needs (meds, wound care, etc.)  - Arrange for interpretive services to assist at discharge as needed  - Refer to Case Management Department for coordinating discharge planning if the patient needs post-hospital services based on physician/advanced practitioner order or complex needs related to functional status, cognitive ability, or social support system  Outcome: Progressing     Problem: Knowledge Deficit  Goal: Patient/family/caregiver demonstrates understanding of disease process, treatment plan, medications, and discharge instructions  Description: Complete learning assessment and assess knowledge base.  Interventions:  - Provide teaching at level of understanding  - Provide teaching via preferred learning methods  Outcome: Progressing  Goal: Verbalizes understanding of labor plan  Description: Assess patient/family/caregiver's baseline knowledge level and ability to understand information.  Provide education via patient/family/caregiver's preferred learning method at appropriate level of understanding.     1. Provide teaching at level of understanding.  2. Provide teaching via preferred learning method(s).  Outcome: Progressing     Problem: Labor & Delivery  Goal: Manages discomfort  Description: Assess and monitor for signs and symptoms of discomfort.  Assess patient's pain level regularly and per hospital policy.  Administer medications as ordered. Support use of nonpharmacological methods to help control pain such as distraction, imagery, relaxation, and application of heat and cold.  Collaborate with interdisciplinary team and patient to determine appropriate pain management plan.    1. Include patient in decisions related to comfort.  2.  Offer non-pharmacological pain management interventions.  3. Report ineffective pain management to physician.  Outcome: Progressing  Goal: Patient vital signs are stable  Description: 1. Assess vital signs - vaginal delivery.  Outcome: Progressing

## 2025-06-05 NOTE — ASSESSMENT & PLAN NOTE
Recovering well, pain is under better control today  Routine postpartum care, encourage ambulation, encourage familial bonding  Lactation support for breast/bottle feeding as needed  FEN: Tolerating regular diet  Pain: Tylenol around the clock, oxycodone, dilaudid,  if needed  Appropriate bowel and bladder function

## 2025-06-05 NOTE — UTILIZATION REVIEW
"Mother and baby discharged 2025      NOTIFICATION OF INPATIENT ADMISSION   MATERNITY/DELIVERY AUTHORIZATION REQUEST   SERVICING FACILITY:   [facility] - L&D, [place], NICU  [address] [place] [address]  Tax ID: [id number]  NPI: [id number]   ATTENDING PROVIDER:  Attending Name and NPI#: Emily Taylor Md [[id number]]  Address: [address] [place] [address] [place] [address]  Phone: [phone]   ADMISSION INFORMATION:  Place of Service: Inpatient [facility]  Place of Service Code: 21  Inpatient Admission Date/Time: 25  9:06 PM  Discharge Date/Time: 2025  2:37 PM  Admitting Diagnosis Code/Description:  36 weeks gestation of pregnancy [Z3A.36]  HBP (high blood pressure) [I10]  Headache [R51.9]  Encounter for full-term uncomplicated delivery [O80]     Mother: Reddy Garcia 1985 Estimated Date of Delivery: 25  Delivering clinician: Emily Taylor   OB History          1    Para   1    Term   1            AB        Living   1         SAB        IAB        Ectopic        Multiple   0    Live Births   1               [facility] Name & MRN:   Information for the patient's :  John Garcia, Baby Girl (Reddy) [[id number]]    Delivery Information:  Sex: female  Delivered 2025 11:52 PM by Vaginal, Forceps; Gestational Age: 37w0d     Measurements:  Weight: 6 lb 1.5 oz (2765 g);  Height: 19\"    APGAR 1 minute 5 minutes 10 minutes   Totals: 8 10       UTILIZATION REVIEW CONTACT:  Kaitlin Davis Utilization   Network Utilization Review Department  Phone: [phone]  Fax [phone]  Email: [email or web address]  Contact for approvals/pending authorizations, clinical reviews, and discharge.     PHYSICIAN ADVISORY SERVICES:  Medical Necessity Denial & Ixxd-lu-Byxg Review  Phone: [phone]  Fax: [phone]  Email: [email or web address]     DISCHARGE SUPPORT TEAM:  For " Patients Discharge Needs & Updates  Phone: [phone] opt. 2 Fax: [phone]  Email: [email or web address]

## 2025-06-05 NOTE — PROGRESS NOTES
Progress Note - OB/GYN   Name: Reddy Garcia 40 y.o. female I MRN: [id number]  Unit/Bed#: -01 I Date of Admission: 6/1/2025   Date of Service: 6/5/2025 I Hospital Day: 4     Assessment & Plan  Forceps delivery  Recovering well, pain is under better control today  Routine postpartum care, encourage ambulation, encourage familial bonding  Lactation support for breast/bottle feeding as needed  FEN: Tolerating regular diet  Pain: Tylenol around the clock, oxycodone, dilaudid,  if needed  Appropriate bowel and bladder function     Rh negative state in antepartum period, third trimester  - S/p Rhogam at 28 weeks on 4/4/25.  - Rhogam pp baby O Pos, Mom O neg  Severe preeclampsia  - Originally with gHTN diagnosis; met criteria for preeclampsia with severe features due to the sustained severe range BP's  - Monitor BP's and signs/symptoms of worsening preeclampsia  - Mag decreased from 2->1 held overnight for dizziness, restarted in early AM  - 6/1: Mag @2028, lab 20  - 6/2: lab 40, 20 + Procardia 30mg; mag d/c'd  - 6/3-present: Procardia 30mg    OB Post-Partum Progress Note  Subjective   Reddy Garcia is a patient of: [facility]. She is PPD#3 s/p Forceps Assisted Vaginal Delivery. Recovering and pain control is better today    Disposition    - Anticipate discharge home on PPD#3-4  Barriers to discharge: Blood Pressures and Pain     Ellen Escobar MD  OBGYN PGY-1  6/5/2025 6:19 AM    Subjective/Objective     Chief Complaint: Postpartum State     Subjective:    Reddy Garcia rates her pain was better controlled overnight. She is currently voiding. She is ambulating. Patient is currently passing flatus and hasn't had a bowel movement. She is tolerating PO, and denies nausea or vomitting. She denies fever, chills, chest pain, shortness of breath, or calf tenderness. Lochia is minimal. She is Breastfeeding.     Objective :  Temp:  [98.3 °F (36.8 °C)-98.8 °F (37.1 °C)] 98.3 °F (36.8  °C)  HR:  [80-95] 80  BP: (127-151)/(63-88) 134/74  Resp:  [18-20] 20  SpO2:  [97 %-98 %] 98 %  O2 Device: None (Room air)    GEN: Reddy Garcia appears well, alert and oriented x 3, pleasant and cooperative   CARDIO: Regular Rate  RESP:  Non-labored breathing  ABDOMEN: soft, appropriate tenderness, firm fundus below umbilicus  EXTREMITIES: Non tender, trace edema appropriate for post-pregnancy, no erythema       Lab Results: I have reviewed the following results:  Lab Results   Component Value Date    WBC 13.26 (H) 06/03/2025    HGB 11.3 (L) 06/03/2025    HCT 34.2 (L) 06/03/2025    MCV 91 06/03/2025     06/03/2025

## 2025-06-06 ENCOUNTER — TELEPHONE (OUTPATIENT)
Dept: LABOR AND DELIVERY | Facility: HOSPITAL | Age: 40
End: 2025-06-06

## 2025-06-06 DIAGNOSIS — N39.0 URINARY TRACT INFECTION: Primary | ICD-10-CM

## 2025-06-06 PROCEDURE — 88307 TISSUE EXAM BY PATHOLOGIST: CPT | Performed by: PATHOLOGY

## 2025-06-06 RX ORDER — CEPHALEXIN 500 MG/1
500 CAPSULE ORAL EVERY 12 HOURS SCHEDULED
Qty: 14 CAPSULE | Refills: 0 | Status: SHIPPED | OUTPATIENT
Start: 2025-06-06 | End: 2025-06-13

## 2025-06-06 NOTE — TELEPHONE ENCOUNTER
I called the patient to inform her that her urinalysis that was conducted postpartum came back with gram-negative rods and to  her antibiotics from the pharmacy when she is able.  I also instructed her to call her OB office if she has any questions.

## 2025-06-07 PROBLEM — N39.0 E. COLI URINARY TRACT INFECTION: Status: ACTIVE | Noted: 2025-06-07

## 2025-06-07 PROBLEM — B96.20 E. COLI URINARY TRACT INFECTION: Status: ACTIVE | Noted: 2025-06-07

## 2025-06-07 LAB
BACTERIA UR CULT: ABNORMAL
BACTERIA UR CULT: ABNORMAL

## 2025-06-11 ENCOUNTER — TELEPHONE (OUTPATIENT)
Dept: OBGYN CLINIC | Facility: CLINIC | Age: 40
End: 2025-06-11

## 2025-06-11 NOTE — TELEPHONE ENCOUNTER
Patient was called for PP follow up.  Left a voicemail.  Patient was reminded that she can message this nurse via My Chart and If  having any symptoms or concerns to Please call [phone]      NGHIA Bishop  OB Nurse Navigator

## 2025-06-12 ENCOUNTER — POSTPARTUM VISIT (OUTPATIENT)
Dept: OBGYN CLINIC | Facility: CLINIC | Age: 40
End: 2025-06-12

## 2025-06-12 VITALS
SYSTOLIC BLOOD PRESSURE: 144 MMHG | DIASTOLIC BLOOD PRESSURE: 96 MMHG | BODY MASS INDEX: 27.66 KG/M2 | HEART RATE: 97 BPM | HEIGHT: 64 IN | TEMPERATURE: 98.2 F | WEIGHT: 162 LBS | OXYGEN SATURATION: 98 %

## 2025-06-12 DIAGNOSIS — O14.10 SEVERE PRE-ECLAMPSIA, ANTEPARTUM: Primary | ICD-10-CM

## 2025-06-12 PROCEDURE — 99024 POSTOP FOLLOW-UP VISIT: CPT | Performed by: STUDENT IN AN ORGANIZED HEALTH CARE EDUCATION/TRAINING PROGRAM

## 2025-06-12 RX ORDER — LABETALOL 200 MG/1
200 TABLET, FILM COATED ORAL 2 TIMES DAILY
Qty: 60 TABLET | Refills: 0 | Status: SHIPPED | OUTPATIENT
Start: 2025-06-12 | End: 2025-06-19

## 2025-06-12 NOTE — PROGRESS NOTES
Generally doing well  Not taking nifedipine  Compelted lasix and abx  BP MR  Will start labetalol given mild HA at baseline  Follow up in 1 wk

## 2025-06-18 ENCOUNTER — TELEPHONE (OUTPATIENT)
Age: 40
End: 2025-06-18

## 2025-06-18 NOTE — TELEPHONE ENCOUNTER
Patient was called for PP follow up.  Left a voicemail in [place]. Patient was reminded that she can message this nurse via My Chart and If  having any symptoms or concerns to Please call [phone].    NGHIA Bishop  OB Nurse Navigator

## 2025-06-19 ENCOUNTER — POSTPARTUM VISIT (OUTPATIENT)
Dept: OBGYN CLINIC | Facility: CLINIC | Age: 40
End: 2025-06-19

## 2025-06-19 VITALS
HEIGHT: 64 IN | WEIGHT: 192 LBS | BODY MASS INDEX: 32.78 KG/M2 | SYSTOLIC BLOOD PRESSURE: 114 MMHG | DIASTOLIC BLOOD PRESSURE: 72 MMHG

## 2025-06-19 DIAGNOSIS — O14.10 SEVERE PRE-ECLAMPSIA, ANTEPARTUM: Primary | ICD-10-CM

## 2025-06-19 PROCEDURE — 99024 POSTOP FOLLOW-UP VISIT: CPT | Performed by: STUDENT IN AN ORGANIZED HEALTH CARE EDUCATION/TRAINING PROGRAM

## 2025-06-19 RX ORDER — LABETALOL 200 MG/1
100 TABLET, FILM COATED ORAL 2 TIMES DAILY
Start: 2025-06-19 | End: 2025-06-23

## 2025-06-19 NOTE — PROGRESS NOTES
Severe preeclampsia  Taper 200 mg BID to 100 mg BID. Follow up as scheduled.   EPDS 0  Otherwise milestones appropriate.

## 2025-06-19 NOTE — ASSESSMENT & PLAN NOTE
Taper 200 mg BID to 100 mg BID. Follow up as scheduled.   EPDS 0  Otherwise milestones appropriate.

## 2025-06-23 ENCOUNTER — POSTPARTUM VISIT (OUTPATIENT)
Dept: OBGYN CLINIC | Facility: CLINIC | Age: 40
End: 2025-06-23
Payer: MEDICARE

## 2025-06-23 VITALS — DIASTOLIC BLOOD PRESSURE: 80 MMHG | WEIGHT: 192 LBS | BODY MASS INDEX: 32.96 KG/M2 | SYSTOLIC BLOOD PRESSURE: 138 MMHG

## 2025-06-23 PROBLEM — O26.893 RH NEGATIVE STATE IN ANTEPARTUM PERIOD, THIRD TRIMESTER: Status: RESOLVED | Noted: 2025-05-20 | Resolved: 2025-06-23

## 2025-06-23 PROBLEM — O13.9 GESTATIONAL HYPERTENSION: Status: RESOLVED | Noted: 2025-03-25 | Resolved: 2025-06-23

## 2025-06-23 PROBLEM — Z67.91 RH NEGATIVE STATE IN ANTEPARTUM PERIOD, THIRD TRIMESTER: Status: RESOLVED | Noted: 2025-05-20 | Resolved: 2025-06-23

## 2025-06-23 PROBLEM — O99.891 BACK PAIN IN PREGNANCY: Status: RESOLVED | Noted: 2025-03-04 | Resolved: 2025-06-23

## 2025-06-23 PROBLEM — M54.9 BACK PAIN IN PREGNANCY: Status: RESOLVED | Noted: 2025-03-04 | Resolved: 2025-06-23

## 2025-06-23 PROBLEM — O09.513 PRIMIGRAVIDA OF ADVANCED MATERNAL AGE IN THIRD TRIMESTER: Status: RESOLVED | Noted: 2024-12-18 | Resolved: 2025-06-23

## 2025-06-23 RX ORDER — ACETAMINOPHEN AND CODEINE PHOSPHATE 120; 12 MG/5ML; MG/5ML
1 SOLUTION ORAL DAILY
Qty: 84 TABLET | Refills: 4 | Status: SHIPPED | OUTPATIENT
Start: 2025-06-23

## 2025-06-23 RX ORDER — ACETAMINOPHEN 325 MG/1
650 TABLET ORAL EVERY 6 HOURS PRN
COMMUNITY

## 2025-06-23 NOTE — PROGRESS NOTES
Reddy Garcia  1985    S:  40 y.o. female here for postpartum visit.  She is s/p FAVD (maternal exhaustion and FHT Cat 2 on 6/2/25 .       Gender: female   Apgars: 8/10  Weight: 6#1.5oz  Her lochia has mostly resolved. Only light bleeding.     She is Breastfeeding and Bottle feeding without problems.   She is eating normally, denies constipation, diarrhea, urinary dysfunction.   She denies postpartum blues/depression.  Her EPDS is 0.      Last Pap: normal, in [facility] June 2024, per patient    We discussed contraceptive options.  At this point she would like progesterone only pills.     O:   /80 (BP Location: Left arm, Patient Position: Sitting, Cuff Size: Adult)   Wt 87.1 kg (192 lb)   LMP 09/16/2024 (Exact Date)   Breastfeeding Yes   BMI 32.96 kg/m²   She appears well and in no distress  Breasts are soft, nontender, without erythema  Abdomen is soft and nontender  External genitals are normal  Vagina is normal  Cervix, uterus and adnexa are nontender, no masses palpable.     A/P:  Postpartum visit.   She will return in 3-4 months for her yearly exam, sooner PRN.   -Will Rx micronor for contraception  -Can d/c labetalol given normal BP today and low dose as it

## 2025-07-07 PROBLEM — N39.0 E. COLI URINARY TRACT INFECTION: Status: RESOLVED | Noted: 2025-06-07 | Resolved: 2025-07-07

## 2025-07-07 PROBLEM — B96.20 E. COLI URINARY TRACT INFECTION: Status: RESOLVED | Noted: 2025-06-07 | Resolved: 2025-07-07

## 2025-08-11 ENCOUNTER — TELEPHONE (OUTPATIENT)
Dept: OBGYN CLINIC | Facility: CLINIC | Age: 40
End: 2025-08-11